# Patient Record
Sex: FEMALE | Race: WHITE | NOT HISPANIC OR LATINO | Employment: OTHER | URBAN - METROPOLITAN AREA
[De-identification: names, ages, dates, MRNs, and addresses within clinical notes are randomized per-mention and may not be internally consistent; named-entity substitution may affect disease eponyms.]

---

## 2023-02-22 LAB
ANION GAP IN SER/PLAS: 12 MMOL/L (ref 10–20)
CALCIUM (MG/DL) IN SER/PLAS: 8.5 MG/DL (ref 8.6–10.3)
CARBON DIOXIDE, TOTAL (MMOL/L) IN SER/PLAS: 34 MMOL/L (ref 21–32)
CHLORIDE (MMOL/L) IN SER/PLAS: 96 MMOL/L (ref 98–107)
CREATININE (MG/DL) IN SER/PLAS: 1.06 MG/DL (ref 0.5–1.05)
ERYTHROCYTE DISTRIBUTION WIDTH (RATIO) BY AUTOMATED COUNT: 16.3 % (ref 11.5–14.5)
ERYTHROCYTE MEAN CORPUSCULAR HEMOGLOBIN CONCENTRATION (G/DL) BY AUTOMATED: 28.9 G/DL (ref 32–36)
ERYTHROCYTE MEAN CORPUSCULAR VOLUME (FL) BY AUTOMATED COUNT: 98 FL (ref 80–100)
ERYTHROCYTES (10*6/UL) IN BLOOD BY AUTOMATED COUNT: 3.53 X10E12/L (ref 4–5.2)
GFR FEMALE: 56 ML/MIN/1.73M2
GLUCOSE (MG/DL) IN SER/PLAS: 103 MG/DL (ref 74–99)
HEMATOCRIT (%) IN BLOOD BY AUTOMATED COUNT: 34.6 % (ref 36–46)
HEMOGLOBIN (G/DL) IN BLOOD: 10 G/DL (ref 12–16)
LEUKOCYTES (10*3/UL) IN BLOOD BY AUTOMATED COUNT: 8.4 X10E9/L (ref 4.4–11.3)
NRBC (PER 100 WBCS) BY AUTOMATED COUNT: 0 /100 WBC (ref 0–0)
PLATELETS (10*3/UL) IN BLOOD AUTOMATED COUNT: 379 X10E9/L (ref 150–450)
POTASSIUM (MMOL/L) IN SER/PLAS: 4.6 MMOL/L (ref 3.5–5.3)
SODIUM (MMOL/L) IN SER/PLAS: 137 MMOL/L (ref 136–145)
UREA NITROGEN (MG/DL) IN SER/PLAS: 22 MG/DL (ref 6–23)

## 2023-03-14 LAB
ALANINE AMINOTRANSFERASE (SGPT) (U/L) IN SER/PLAS: 4 U/L (ref 7–45)
ALBUMIN (G/DL) IN SER/PLAS: 3 G/DL (ref 3.4–5)
ALKALINE PHOSPHATASE (U/L) IN SER/PLAS: 59 U/L (ref 33–136)
ANION GAP IN SER/PLAS: 19 MMOL/L (ref 10–20)
ASPARTATE AMINOTRANSFERASE (SGOT) (U/L) IN SER/PLAS: 8 U/L (ref 9–39)
BILIRUBIN TOTAL (MG/DL) IN SER/PLAS: 0.4 MG/DL (ref 0–1.2)
CALCIUM (MG/DL) IN SER/PLAS: 8.7 MG/DL (ref 8.6–10.3)
CARBON DIOXIDE, TOTAL (MMOL/L) IN SER/PLAS: 21 MMOL/L (ref 21–32)
CHLORIDE (MMOL/L) IN SER/PLAS: 103 MMOL/L (ref 98–107)
CREATININE (MG/DL) IN SER/PLAS: 1.82 MG/DL (ref 0.5–1.05)
ERYTHROCYTE DISTRIBUTION WIDTH (RATIO) BY AUTOMATED COUNT: 18 % (ref 11.5–14.5)
ERYTHROCYTE MEAN CORPUSCULAR HEMOGLOBIN CONCENTRATION (G/DL) BY AUTOMATED: 27.1 G/DL (ref 32–36)
ERYTHROCYTE MEAN CORPUSCULAR VOLUME (FL) BY AUTOMATED COUNT: 105 FL (ref 80–100)
ERYTHROCYTES (10*6/UL) IN BLOOD BY AUTOMATED COUNT: 3.72 X10E12/L (ref 4–5.2)
GFR FEMALE: 29 ML/MIN/1.73M2
GLUCOSE (MG/DL) IN SER/PLAS: 77 MG/DL (ref 74–99)
HEMATOCRIT (%) IN BLOOD BY AUTOMATED COUNT: 39.1 % (ref 36–46)
HEMOGLOBIN (G/DL) IN BLOOD: 10.6 G/DL (ref 12–16)
LEUKOCYTES (10*3/UL) IN BLOOD BY AUTOMATED COUNT: 22.8 X10E9/L (ref 4.4–11.3)
NRBC (PER 100 WBCS) BY AUTOMATED COUNT: 0.2 /100 WBC (ref 0–0)
PLATELETS (10*3/UL) IN BLOOD AUTOMATED COUNT: 298 X10E9/L (ref 150–450)
POTASSIUM (MMOL/L) IN SER/PLAS: 4.8 MMOL/L (ref 3.5–5.3)
PROTEIN TOTAL: 5.7 G/DL (ref 6.4–8.2)
SODIUM (MMOL/L) IN SER/PLAS: 138 MMOL/L (ref 136–145)
UREA NITROGEN (MG/DL) IN SER/PLAS: 64 MG/DL (ref 6–23)

## 2023-03-15 LAB
ANION GAP IN SER/PLAS: 20 MMOL/L (ref 10–20)
BASOPHILS (10*3/UL) IN BLOOD BY AUTOMATED COUNT: 0.05 X10E9/L (ref 0–0.1)
BASOPHILS/100 LEUKOCYTES IN BLOOD BY AUTOMATED COUNT: 0.2 % (ref 0–2)
CALCIUM (MG/DL) IN SER/PLAS: 8.5 MG/DL (ref 8.6–10.3)
CARBON DIOXIDE, TOTAL (MMOL/L) IN SER/PLAS: 21 MMOL/L (ref 21–32)
CHLORIDE (MMOL/L) IN SER/PLAS: 101 MMOL/L (ref 98–107)
CREATININE (MG/DL) IN SER/PLAS: 2.38 MG/DL (ref 0.5–1.05)
EOSINOPHILS (10*3/UL) IN BLOOD BY AUTOMATED COUNT: 0.02 X10E9/L (ref 0–0.4)
EOSINOPHILS/100 LEUKOCYTES IN BLOOD BY AUTOMATED COUNT: 0.1 % (ref 0–6)
ERYTHROCYTE DISTRIBUTION WIDTH (RATIO) BY AUTOMATED COUNT: 17.9 % (ref 11.5–14.5)
ERYTHROCYTE MEAN CORPUSCULAR HEMOGLOBIN CONCENTRATION (G/DL) BY AUTOMATED: 27.2 G/DL (ref 32–36)
ERYTHROCYTE MEAN CORPUSCULAR VOLUME (FL) BY AUTOMATED COUNT: 101 FL (ref 80–100)
ERYTHROCYTES (10*6/UL) IN BLOOD BY AUTOMATED COUNT: 3.66 X10E12/L (ref 4–5.2)
GFR FEMALE: 21 ML/MIN/1.73M2
GLUCOSE (MG/DL) IN SER/PLAS: 70 MG/DL (ref 74–99)
HEMATOCRIT (%) IN BLOOD BY AUTOMATED COUNT: 37.1 % (ref 36–46)
HEMOGLOBIN (G/DL) IN BLOOD: 10.1 G/DL (ref 12–16)
IMMATURE GRANULOCYTES/100 LEUKOCYTES IN BLOOD BY AUTOMATED COUNT: 1.4 % (ref 0–0.9)
LEUKOCYTES (10*3/UL) IN BLOOD BY AUTOMATED COUNT: 24.5 X10E9/L (ref 4.4–11.3)
LYMPHOCYTES (10*3/UL) IN BLOOD BY AUTOMATED COUNT: 1.1 X10E9/L (ref 0.8–3)
LYMPHOCYTES/100 LEUKOCYTES IN BLOOD BY AUTOMATED COUNT: 4.5 % (ref 13–44)
MONOCYTES (10*3/UL) IN BLOOD BY AUTOMATED COUNT: 1.99 X10E9/L (ref 0.05–0.8)
MONOCYTES/100 LEUKOCYTES IN BLOOD BY AUTOMATED COUNT: 8.1 % (ref 2–10)
NEUTROPHILS (10*3/UL) IN BLOOD BY AUTOMATED COUNT: 20.95 X10E9/L (ref 1.6–5.5)
NEUTROPHILS/100 LEUKOCYTES IN BLOOD BY AUTOMATED COUNT: 85.7 % (ref 40–80)
NRBC (PER 100 WBCS) BY AUTOMATED COUNT: 0.4 /100 WBC (ref 0–0)
PLATELETS (10*3/UL) IN BLOOD AUTOMATED COUNT: 309 X10E9/L (ref 150–450)
POTASSIUM (MMOL/L) IN SER/PLAS: 4.6 MMOL/L (ref 3.5–5.3)
RBC MORPHOLOGY IN BLOOD: NORMAL
SODIUM (MMOL/L) IN SER/PLAS: 137 MMOL/L (ref 136–145)
UREA NITROGEN (MG/DL) IN SER/PLAS: 69 MG/DL (ref 6–23)

## 2023-11-09 ENCOUNTER — LAB REQUISITION (OUTPATIENT)
Dept: LAB | Facility: HOSPITAL | Age: 73
End: 2023-11-09
Payer: MEDICARE

## 2023-11-09 ENCOUNTER — APPOINTMENT (OUTPATIENT)
Dept: RADIOLOGY | Facility: HOSPITAL | Age: 73
DRG: 291 | End: 2023-11-09
Payer: MEDICARE

## 2023-11-09 ENCOUNTER — HOSPITAL ENCOUNTER (INPATIENT)
Facility: HOSPITAL | Age: 73
LOS: 13 days | Discharge: HOSPICE | DRG: 291 | End: 2023-11-22
Attending: EMERGENCY MEDICINE | Admitting: INTERNAL MEDICINE
Payer: MEDICARE

## 2023-11-09 DIAGNOSIS — N17.9 AKI (ACUTE KIDNEY INJURY) (CMS-HCC): ICD-10-CM

## 2023-11-09 DIAGNOSIS — I50.9 ACUTE DECOMPENSATED HEART FAILURE (MULTI): ICD-10-CM

## 2023-11-09 DIAGNOSIS — N39.0 URINARY TRACT INFECTION ASSOCIATED WITH INDWELLING URETHRAL CATHETER, INITIAL ENCOUNTER (CMS-HCC): ICD-10-CM

## 2023-11-09 DIAGNOSIS — T83.511A URINARY TRACT INFECTION ASSOCIATED WITH INDWELLING URETHRAL CATHETER, INITIAL ENCOUNTER (CMS-HCC): ICD-10-CM

## 2023-11-09 DIAGNOSIS — I48.20 CHRONIC ATRIAL FIBRILLATION (MULTI): ICD-10-CM

## 2023-11-09 DIAGNOSIS — R41.82 ALTERED MENTAL STATUS, UNSPECIFIED: ICD-10-CM

## 2023-11-09 DIAGNOSIS — R09.02 HYPOXIA: ICD-10-CM

## 2023-11-09 DIAGNOSIS — I50.9 HEART FAILURE, UNSPECIFIED (MULTI): ICD-10-CM

## 2023-11-09 DIAGNOSIS — M62.82 NON-TRAUMATIC RHABDOMYOLYSIS: Primary | ICD-10-CM

## 2023-11-09 DIAGNOSIS — E11.9 TYPE 2 DIABETES MELLITUS WITHOUT COMPLICATIONS (MULTI): ICD-10-CM

## 2023-11-09 LAB
ABO GROUP (TYPE) IN BLOOD: NORMAL
ALBUMIN SERPL BCP-MCNC: 3 G/DL (ref 3.4–5)
ALP SERPL-CCNC: 89 U/L (ref 33–136)
ALT SERPL W P-5'-P-CCNC: 25 U/L (ref 7–45)
ANION GAP SERPL CALC-SCNC: 12 MMOL/L (ref 10–20)
ANION GAP SERPL CALC-SCNC: 12 MMOL/L (ref 10–20)
ANTIBODY SCREEN: NORMAL
APPEARANCE UR: ABNORMAL
APTT PPP: 30 SECONDS (ref 27–38)
AST SERPL W P-5'-P-CCNC: 8 U/L (ref 9–39)
BACTERIA #/AREA URNS AUTO: ABNORMAL /HPF
BASE EXCESS BLDV CALC-SCNC: 3.2 MMOL/L (ref -2–3)
BASOPHILS # BLD AUTO: 0.02 X10*3/UL (ref 0–0.1)
BASOPHILS # BLD AUTO: 0.03 X10*3/UL (ref 0–0.1)
BASOPHILS NFR BLD AUTO: 0.1 %
BASOPHILS NFR BLD AUTO: 0.3 %
BILIRUB SERPL-MCNC: 0.3 MG/DL (ref 0–1.2)
BILIRUB UR STRIP.AUTO-MCNC: NEGATIVE MG/DL
BNP SERPL-MCNC: 116 PG/ML (ref 0–99)
BODY TEMPERATURE: 37 DEGREES CELSIUS
BUN SERPL-MCNC: 59 MG/DL (ref 6–23)
BUN SERPL-MCNC: 59 MG/DL (ref 6–23)
CALCIUM SERPL-MCNC: 8.5 MG/DL (ref 8.6–10.3)
CALCIUM SERPL-MCNC: 8.7 MG/DL (ref 8.6–10.3)
CARDIAC TROPONIN I PNL SERPL HS: 17 NG/L (ref 0–13)
CHLORIDE SERPL-SCNC: 101 MMOL/L (ref 98–107)
CHLORIDE SERPL-SCNC: 102 MMOL/L (ref 98–107)
CO2 SERPL-SCNC: 32 MMOL/L (ref 21–32)
CO2 SERPL-SCNC: 33 MMOL/L (ref 21–32)
COLOR UR: YELLOW
CREAT SERPL-MCNC: 1.27 MG/DL (ref 0.5–1.05)
CREAT SERPL-MCNC: 1.46 MG/DL (ref 0.5–1.05)
EOSINOPHIL # BLD AUTO: 0.01 X10*3/UL (ref 0–0.4)
EOSINOPHIL # BLD AUTO: 0.2 X10*3/UL (ref 0–0.4)
EOSINOPHIL NFR BLD AUTO: 0.1 %
EOSINOPHIL NFR BLD AUTO: 1.7 %
ERYTHROCYTE [DISTWIDTH] IN BLOOD BY AUTOMATED COUNT: 17.8 % (ref 11.5–14.5)
ERYTHROCYTE [DISTWIDTH] IN BLOOD BY AUTOMATED COUNT: 17.8 % (ref 11.5–14.5)
GFR SERPL CREATININE-BSD FRML MDRD: 38 ML/MIN/1.73M*2
GFR SERPL CREATININE-BSD FRML MDRD: 45 ML/MIN/1.73M*2
GLUCOSE SERPL-MCNC: 118 MG/DL (ref 74–99)
GLUCOSE SERPL-MCNC: 183 MG/DL (ref 74–99)
GLUCOSE UR STRIP.AUTO-MCNC: NEGATIVE MG/DL
HCO3 BLDV-SCNC: 31.5 MMOL/L (ref 22–26)
HCT VFR BLD AUTO: 29.7 % (ref 36–46)
HCT VFR BLD AUTO: 30.4 % (ref 36–46)
HGB BLD-MCNC: 8.3 G/DL (ref 12–16)
HGB BLD-MCNC: 8.3 G/DL (ref 12–16)
HYALINE CASTS #/AREA URNS AUTO: ABNORMAL /LPF
IMM GRANULOCYTES # BLD AUTO: 0.14 X10*3/UL (ref 0–0.5)
IMM GRANULOCYTES # BLD AUTO: 0.15 X10*3/UL (ref 0–0.5)
IMM GRANULOCYTES NFR BLD AUTO: 1 % (ref 0–0.9)
IMM GRANULOCYTES NFR BLD AUTO: 1.2 % (ref 0–0.9)
INHALED O2 CONCENTRATION: 36 %
INR PPP: 1 (ref 0.9–1.1)
KETONES UR STRIP.AUTO-MCNC: NEGATIVE MG/DL
LACTATE SERPL-SCNC: 3.6 MMOL/L (ref 0.4–2)
LEUKOCYTE ESTERASE UR QL STRIP.AUTO: ABNORMAL
LYMPHOCYTES # BLD AUTO: 0.38 X10*3/UL (ref 0.8–3)
LYMPHOCYTES # BLD AUTO: 1.09 X10*3/UL (ref 0.8–3)
LYMPHOCYTES NFR BLD AUTO: 2.6 %
LYMPHOCYTES NFR BLD AUTO: 9.3 %
MAGNESIUM SERPL-MCNC: 1.67 MG/DL (ref 1.6–2.4)
MCH RBC QN AUTO: 25.8 PG (ref 26–34)
MCH RBC QN AUTO: 26.3 PG (ref 26–34)
MCHC RBC AUTO-ENTMCNC: 27.3 G/DL (ref 32–36)
MCHC RBC AUTO-ENTMCNC: 27.9 G/DL (ref 32–36)
MCV RBC AUTO: 94 FL (ref 80–100)
MCV RBC AUTO: 94 FL (ref 80–100)
MONOCYTES # BLD AUTO: 0.5 X10*3/UL (ref 0.05–0.8)
MONOCYTES # BLD AUTO: 1.07 X10*3/UL (ref 0.05–0.8)
MONOCYTES NFR BLD AUTO: 3.4 %
MONOCYTES NFR BLD AUTO: 9.2 %
MUCOUS THREADS #/AREA URNS AUTO: ABNORMAL /LPF
NEUTROPHILS # BLD AUTO: 13.55 X10*3/UL (ref 1.6–5.5)
NEUTROPHILS # BLD AUTO: 9.13 X10*3/UL (ref 1.6–5.5)
NEUTROPHILS NFR BLD AUTO: 78.3 %
NEUTROPHILS NFR BLD AUTO: 92.8 %
NITRITE UR QL STRIP.AUTO: NEGATIVE
NRBC BLD-RTO: 0.3 /100 WBCS (ref 0–0)
NRBC BLD-RTO: 0.3 /100 WBCS (ref 0–0)
OXYHGB MFR BLDV: 59 % (ref 45–75)
PCO2 BLDV: 64 MM HG (ref 41–51)
PH BLDV: 7.3 PH (ref 7.33–7.43)
PH UR STRIP.AUTO: 5 [PH]
PLATELET # BLD AUTO: 306 X10*3/UL (ref 150–450)
PLATELET # BLD AUTO: 319 X10*3/UL (ref 150–450)
PO2 BLDV: 36 MM HG (ref 35–45)
POTASSIUM SERPL-SCNC: 4 MMOL/L (ref 3.5–5.3)
POTASSIUM SERPL-SCNC: 4.1 MMOL/L (ref 3.5–5.3)
PROT SERPL-MCNC: 5.6 G/DL (ref 6.4–8.2)
PROT UR STRIP.AUTO-MCNC: NEGATIVE MG/DL
PROTHROMBIN TIME: 11.6 SECONDS (ref 9.8–12.8)
RBC # BLD AUTO: 3.15 X10*6/UL (ref 4–5.2)
RBC # BLD AUTO: 3.22 X10*6/UL (ref 4–5.2)
RBC # UR STRIP.AUTO: ABNORMAL /UL
RBC #/AREA URNS AUTO: >20 /HPF
RH FACTOR (ANTIGEN D): NORMAL
SAO2 % BLDV: 61 % (ref 45–75)
SODIUM SERPL-SCNC: 142 MMOL/L (ref 136–145)
SODIUM SERPL-SCNC: 142 MMOL/L (ref 136–145)
SP GR UR STRIP.AUTO: 1.01
SQUAMOUS #/AREA URNS AUTO: ABNORMAL /HPF
TEST COMMENT: ABNORMAL
TRANS CELLS #/AREA UR COMP ASSIST: ABNORMAL /HPF
UROBILINOGEN UR STRIP.AUTO-MCNC: <2 MG/DL
WBC # BLD AUTO: 11.7 X10*3/UL (ref 4.4–11.3)
WBC # BLD AUTO: 14.6 X10*3/UL (ref 4.4–11.3)
WBC #/AREA URNS AUTO: >50 /HPF
WBC CLUMPS #/AREA URNS AUTO: ABNORMAL /HPF
YEAST BUDDING #/AREA UR COMP ASSIST: PRESENT /HPF

## 2023-11-09 PROCEDURE — 36415 COLL VENOUS BLD VENIPUNCTURE: CPT | Performed by: EMERGENCY MEDICINE

## 2023-11-09 PROCEDURE — 85730 THROMBOPLASTIN TIME PARTIAL: CPT | Performed by: EMERGENCY MEDICINE

## 2023-11-09 PROCEDURE — 71045 X-RAY EXAM CHEST 1 VIEW: CPT | Mod: FOREIGN READ | Performed by: RADIOLOGY

## 2023-11-09 PROCEDURE — 82805 BLOOD GASES W/O2 SATURATION: CPT | Performed by: EMERGENCY MEDICINE

## 2023-11-09 PROCEDURE — 84484 ASSAY OF TROPONIN QUANT: CPT | Performed by: EMERGENCY MEDICINE

## 2023-11-09 PROCEDURE — 85025 COMPLETE CBC W/AUTO DIFF WBC: CPT

## 2023-11-09 PROCEDURE — 71045 X-RAY EXAM CHEST 1 VIEW: CPT | Mod: FY,FR

## 2023-11-09 PROCEDURE — 87086 URINE CULTURE/COLONY COUNT: CPT | Mod: PARLAB | Performed by: EMERGENCY MEDICINE

## 2023-11-09 PROCEDURE — 99285 EMERGENCY DEPT VISIT HI MDM: CPT | Mod: 25 | Performed by: EMERGENCY MEDICINE

## 2023-11-09 PROCEDURE — 80053 COMPREHEN METABOLIC PANEL: CPT | Performed by: EMERGENCY MEDICINE

## 2023-11-09 PROCEDURE — 85610 PROTHROMBIN TIME: CPT | Performed by: EMERGENCY MEDICINE

## 2023-11-09 PROCEDURE — 1210000001 HC SEMI-PRIVATE ROOM DAILY

## 2023-11-09 PROCEDURE — 86901 BLOOD TYPING SEROLOGIC RH(D): CPT | Performed by: EMERGENCY MEDICINE

## 2023-11-09 PROCEDURE — 80048 BASIC METABOLIC PNL TOTAL CA: CPT

## 2023-11-09 PROCEDURE — 2500000004 HC RX 250 GENERAL PHARMACY W/ HCPCS (ALT 636 FOR OP/ED): Performed by: EMERGENCY MEDICINE

## 2023-11-09 PROCEDURE — 83605 ASSAY OF LACTIC ACID: CPT | Performed by: EMERGENCY MEDICINE

## 2023-11-09 PROCEDURE — 83735 ASSAY OF MAGNESIUM: CPT | Performed by: EMERGENCY MEDICINE

## 2023-11-09 PROCEDURE — 81001 URINALYSIS AUTO W/SCOPE: CPT | Performed by: EMERGENCY MEDICINE

## 2023-11-09 PROCEDURE — 85025 COMPLETE CBC W/AUTO DIFF WBC: CPT | Performed by: EMERGENCY MEDICINE

## 2023-11-09 PROCEDURE — 83880 ASSAY OF NATRIURETIC PEPTIDE: CPT | Performed by: EMERGENCY MEDICINE

## 2023-11-09 RX ORDER — FUROSEMIDE 10 MG/ML
40 INJECTION INTRAMUSCULAR; INTRAVENOUS ONCE
Status: COMPLETED | OUTPATIENT
Start: 2023-11-09 | End: 2023-11-09

## 2023-11-09 RX ADMIN — FUROSEMIDE 40 MG: 10 INJECTION, SOLUTION INTRAMUSCULAR; INTRAVENOUS at 23:44

## 2023-11-09 NOTE — Clinical Note
Dialysis catheter placed tunneled thru right jugular patient tolerated well flushed with heparin, sutured in place.

## 2023-11-10 PROBLEM — I50.9 ACUTE DECOMPENSATED HEART FAILURE (MULTI): Status: ACTIVE | Noted: 2023-11-10

## 2023-11-10 LAB
CARDIAC TROPONIN I PNL SERPL HS: 18 NG/L (ref 0–13)
GLUCOSE BLD MANUAL STRIP-MCNC: 101 MG/DL (ref 74–99)
GLUCOSE BLD MANUAL STRIP-MCNC: 119 MG/DL (ref 74–99)
GLUCOSE BLD MANUAL STRIP-MCNC: 152 MG/DL (ref 74–99)
GLUCOSE BLD MANUAL STRIP-MCNC: 51 MG/DL (ref 74–99)
GLUCOSE BLD MANUAL STRIP-MCNC: 80 MG/DL (ref 74–99)
LACTATE SERPL-SCNC: 0.5 MMOL/L (ref 0.4–2)

## 2023-11-10 PROCEDURE — 2500000004 HC RX 250 GENERAL PHARMACY W/ HCPCS (ALT 636 FOR OP/ED): Performed by: INTERNAL MEDICINE

## 2023-11-10 PROCEDURE — 2060000001 HC INTERMEDIATE ICU ROOM DAILY

## 2023-11-10 PROCEDURE — 2500000004 HC RX 250 GENERAL PHARMACY W/ HCPCS (ALT 636 FOR OP/ED): Performed by: NURSE PRACTITIONER

## 2023-11-10 PROCEDURE — 83605 ASSAY OF LACTIC ACID: CPT | Performed by: EMERGENCY MEDICINE

## 2023-11-10 PROCEDURE — 99223 1ST HOSP IP/OBS HIGH 75: CPT | Performed by: NURSE PRACTITIONER

## 2023-11-10 PROCEDURE — 2500000001 HC RX 250 WO HCPCS SELF ADMINISTERED DRUGS (ALT 637 FOR MEDICARE OP): Performed by: INTERNAL MEDICINE

## 2023-11-10 PROCEDURE — 2500000001 HC RX 250 WO HCPCS SELF ADMINISTERED DRUGS (ALT 637 FOR MEDICARE OP): Performed by: NURSE PRACTITIONER

## 2023-11-10 PROCEDURE — 82947 ASSAY GLUCOSE BLOOD QUANT: CPT

## 2023-11-10 PROCEDURE — 84484 ASSAY OF TROPONIN QUANT: CPT | Performed by: INTERNAL MEDICINE

## 2023-11-10 RX ORDER — PANTOPRAZOLE SODIUM 40 MG/1
40 TABLET, DELAYED RELEASE ORAL DAILY
COMMUNITY

## 2023-11-10 RX ORDER — EAR PLUGS
1 EACH OTIC (EAR)
Status: DISCONTINUED | OUTPATIENT
Start: 2023-11-10 | End: 2023-11-10

## 2023-11-10 RX ORDER — GABAPENTIN 100 MG/1
100 CAPSULE ORAL DAILY
Status: DISCONTINUED | OUTPATIENT
Start: 2023-11-10 | End: 2023-11-22

## 2023-11-10 RX ORDER — LORAZEPAM 0.5 MG/1
1 TABLET ORAL DAILY PRN
Status: DISCONTINUED | OUTPATIENT
Start: 2023-11-10 | End: 2023-11-22 | Stop reason: HOSPADM

## 2023-11-10 RX ORDER — NYSTATIN 100000 [USP'U]/G
1 POWDER TOPICAL AS NEEDED
Status: DISCONTINUED | OUTPATIENT
Start: 2023-11-10 | End: 2023-11-22 | Stop reason: HOSPADM

## 2023-11-10 RX ORDER — ACETAMINOPHEN 325 MG/1
325 TABLET ORAL EVERY 8 HOURS PRN
Status: DISCONTINUED | OUTPATIENT
Start: 2023-11-10 | End: 2023-11-22 | Stop reason: HOSPADM

## 2023-11-10 RX ORDER — DOCUSATE SODIUM 250 MG
100 CAPSULE ORAL 2 TIMES DAILY
COMMUNITY

## 2023-11-10 RX ORDER — METOPROLOL TARTRATE 25 MG/1
25 TABLET, FILM COATED ORAL 2 TIMES DAILY
Status: DISCONTINUED | OUTPATIENT
Start: 2023-11-10 | End: 2023-11-22 | Stop reason: HOSPADM

## 2023-11-10 RX ORDER — ACETAMINOPHEN 500 MG
500 TABLET ORAL EVERY 8 HOURS PRN
COMMUNITY

## 2023-11-10 RX ORDER — GABAPENTIN 100 MG/1
100 CAPSULE ORAL DAILY
COMMUNITY

## 2023-11-10 RX ORDER — LORAZEPAM 1 MG/1
1 TABLET ORAL DAILY PRN
COMMUNITY

## 2023-11-10 RX ORDER — PREDNISONE 20 MG/1
20 TABLET ORAL DAILY
COMMUNITY

## 2023-11-10 RX ORDER — LEVOTHYROXINE SODIUM 100 UG/1
100 TABLET ORAL
Status: DISCONTINUED | OUTPATIENT
Start: 2023-11-11 | End: 2023-11-10

## 2023-11-10 RX ORDER — BUSPIRONE HYDROCHLORIDE 10 MG/1
5 TABLET ORAL 2 TIMES DAILY
Status: DISCONTINUED | OUTPATIENT
Start: 2023-11-10 | End: 2023-11-22 | Stop reason: HOSPADM

## 2023-11-10 RX ORDER — INSULIN LISPRO 100 [IU]/ML
0-5 INJECTION, SOLUTION INTRAVENOUS; SUBCUTANEOUS
Status: DISCONTINUED | OUTPATIENT
Start: 2023-11-10 | End: 2023-11-18

## 2023-11-10 RX ORDER — IBUPROFEN 200 MG
TABLET ORAL DAILY
COMMUNITY

## 2023-11-10 RX ORDER — DILTIAZEM HYDROCHLORIDE 240 MG/1
240 CAPSULE, COATED, EXTENDED RELEASE ORAL DAILY
COMMUNITY

## 2023-11-10 RX ORDER — BISACODYL 5 MG
10 TABLET, DELAYED RELEASE (ENTERIC COATED) ORAL DAILY PRN
Status: DISCONTINUED | OUTPATIENT
Start: 2023-11-10 | End: 2023-11-22 | Stop reason: HOSPADM

## 2023-11-10 RX ORDER — NYSTATIN 100000 [USP'U]/G
1 POWDER TOPICAL AS NEEDED
COMMUNITY

## 2023-11-10 RX ORDER — DILTIAZEM HYDROCHLORIDE 240 MG/1
240 CAPSULE, COATED, EXTENDED RELEASE ORAL DAILY
Status: DISCONTINUED | OUTPATIENT
Start: 2023-11-10 | End: 2023-11-22 | Stop reason: HOSPADM

## 2023-11-10 RX ORDER — ZINC OXIDE 20 G/100G
1 OINTMENT TOPICAL
Status: DISCONTINUED | OUTPATIENT
Start: 2023-11-10 | End: 2023-11-22 | Stop reason: HOSPADM

## 2023-11-10 RX ORDER — METOPROLOL TARTRATE 25 MG/1
25 TABLET, FILM COATED ORAL 2 TIMES DAILY
COMMUNITY

## 2023-11-10 RX ORDER — LEVOTHYROXINE SODIUM 50 UG/1
100 TABLET ORAL
COMMUNITY

## 2023-11-10 RX ORDER — PANTOPRAZOLE SODIUM 40 MG/1
40 TABLET, DELAYED RELEASE ORAL DAILY
Status: DISCONTINUED | OUTPATIENT
Start: 2023-11-10 | End: 2023-11-15

## 2023-11-10 RX ORDER — TORSEMIDE 20 MG/1
20 TABLET ORAL DAILY
COMMUNITY

## 2023-11-10 RX ORDER — DEXTROSE MONOHYDRATE 100 MG/ML
0.3 INJECTION, SOLUTION INTRAVENOUS ONCE AS NEEDED
Status: DISCONTINUED | OUTPATIENT
Start: 2023-11-10 | End: 2023-11-22 | Stop reason: HOSPADM

## 2023-11-10 RX ORDER — LEVOTHYROXINE SODIUM 50 UG/1
50 TABLET ORAL
COMMUNITY

## 2023-11-10 RX ORDER — BISACODYL 5 MG
10 TABLET, DELAYED RELEASE (ENTERIC COATED) ORAL DAILY PRN
COMMUNITY

## 2023-11-10 RX ORDER — FUROSEMIDE 10 MG/ML
20 INJECTION INTRAMUSCULAR; INTRAVENOUS EVERY 12 HOURS
Status: DISCONTINUED | OUTPATIENT
Start: 2023-11-10 | End: 2023-11-12

## 2023-11-10 RX ORDER — POTASSIUM CHLORIDE 20 MEQ/1
20 TABLET, EXTENDED RELEASE ORAL DAILY
Status: DISCONTINUED | OUTPATIENT
Start: 2023-11-10 | End: 2023-11-22 | Stop reason: HOSPADM

## 2023-11-10 RX ORDER — DEXTROSE 50 % IN WATER (D50W) INTRAVENOUS SYRINGE
25
Status: DISCONTINUED | OUTPATIENT
Start: 2023-11-10 | End: 2023-11-22 | Stop reason: HOSPADM

## 2023-11-10 RX ORDER — POTASSIUM CHLORIDE 20 MEQ/1
20 TABLET, EXTENDED RELEASE ORAL DAILY
COMMUNITY

## 2023-11-10 RX ORDER — LEVOTHYROXINE SODIUM 50 UG/1
50 TABLET ORAL
Status: DISCONTINUED | OUTPATIENT
Start: 2023-11-11 | End: 2023-11-10

## 2023-11-10 RX ORDER — TORSEMIDE 20 MG/1
20 TABLET ORAL DAILY
Status: DISCONTINUED | OUTPATIENT
Start: 2023-11-10 | End: 2023-11-22 | Stop reason: HOSPADM

## 2023-11-10 RX ORDER — TRAMADOL HYDROCHLORIDE 50 MG/1
50 TABLET ORAL EVERY 6 HOURS PRN
COMMUNITY

## 2023-11-10 RX ORDER — LEVOTHYROXINE SODIUM 100 UG/1
100 TABLET ORAL
Status: DISCONTINUED | OUTPATIENT
Start: 2023-11-13 | End: 2023-11-22 | Stop reason: HOSPADM

## 2023-11-10 RX ORDER — FLUCONAZOLE 100 MG/1
150 TABLET ORAL ONCE
Status: COMPLETED | OUTPATIENT
Start: 2023-11-10 | End: 2023-11-10

## 2023-11-10 RX ORDER — POLYETHYLENE GLYCOL 3350 17 G/17G
17 POWDER, FOR SOLUTION ORAL DAILY
Status: DISCONTINUED | OUTPATIENT
Start: 2023-11-10 | End: 2023-11-22 | Stop reason: HOSPADM

## 2023-11-10 RX ORDER — PREDNISONE 20 MG/1
20 TABLET ORAL DAILY
Status: DISCONTINUED | OUTPATIENT
Start: 2023-11-10 | End: 2023-11-22 | Stop reason: HOSPADM

## 2023-11-10 RX ORDER — TRAMADOL HYDROCHLORIDE 50 MG/1
50 TABLET ORAL EVERY 6 HOURS PRN
Status: DISCONTINUED | OUTPATIENT
Start: 2023-11-10 | End: 2023-11-22 | Stop reason: HOSPADM

## 2023-11-10 RX ORDER — L. ACIDOPHILUS/L.BULGARICUS 1MM CELL
1 TABLET ORAL DAILY
Status: DISCONTINUED | OUTPATIENT
Start: 2023-11-10 | End: 2023-11-22 | Stop reason: HOSPADM

## 2023-11-10 RX ORDER — LEVOTHYROXINE SODIUM 50 UG/1
50 TABLET ORAL
Status: DISCONTINUED | OUTPATIENT
Start: 2023-11-11 | End: 2023-11-22 | Stop reason: HOSPADM

## 2023-11-10 RX ORDER — BUSPIRONE HYDROCHLORIDE 5 MG/1
5 TABLET ORAL 2 TIMES DAILY
COMMUNITY

## 2023-11-10 RX ADMIN — PREDNISONE 20 MG: 20 TABLET ORAL at 12:00

## 2023-11-10 RX ADMIN — Medication 1 TABLET: at 12:00

## 2023-11-10 RX ADMIN — GABAPENTIN 100 MG: 100 CAPSULE ORAL at 12:00

## 2023-11-10 RX ADMIN — PIPERACILLIN SODIUM AND TAZOBACTAM SODIUM 3.38 G: 3; .375 INJECTION, SOLUTION INTRAVENOUS at 17:30

## 2023-11-10 RX ADMIN — PIPERACILLIN SODIUM AND TAZOBACTAM SODIUM 3.38 G: 3; .375 INJECTION, SOLUTION INTRAVENOUS at 11:59

## 2023-11-10 RX ADMIN — APIXABAN 5 MG: 5 TABLET, FILM COATED ORAL at 12:00

## 2023-11-10 RX ADMIN — FLUCONAZOLE 150 MG: 100 TABLET ORAL at 06:09

## 2023-11-10 RX ADMIN — BUSPIRONE HYDROCHLORIDE 5 MG: 5 TABLET ORAL at 21:55

## 2023-11-10 RX ADMIN — BUSPIRONE HYDROCHLORIDE 5 MG: 5 TABLET ORAL at 12:00

## 2023-11-10 RX ADMIN — PIPERACILLIN SODIUM AND TAZOBACTAM SODIUM 3.38 G: 3; .375 INJECTION, SOLUTION INTRAVENOUS at 06:34

## 2023-11-10 RX ADMIN — APIXABAN 5 MG: 5 TABLET, FILM COATED ORAL at 21:55

## 2023-11-10 RX ADMIN — POLYETHYLENE GLYCOL 3350 17 G: 17 POWDER, FOR SOLUTION ORAL at 09:24

## 2023-11-10 RX ADMIN — TORSEMIDE 20 MG: 20 TABLET ORAL at 11:59

## 2023-11-10 RX ADMIN — DILTIAZEM HYDROCHLORIDE 240 MG: 240 CAPSULE, COATED, EXTENDED RELEASE ORAL at 12:01

## 2023-11-10 RX ADMIN — PANTOPRAZOLE SODIUM 40 MG: 40 TABLET, DELAYED RELEASE ORAL at 12:00

## 2023-11-10 RX ADMIN — FUROSEMIDE 20 MG: 10 INJECTION, SOLUTION INTRAMUSCULAR; INTRAVENOUS at 14:10

## 2023-11-10 RX ADMIN — POTASSIUM CHLORIDE 20 MEQ: 1500 TABLET, EXTENDED RELEASE ORAL at 11:59

## 2023-11-10 RX ADMIN — METOPROLOL TARTRATE 25 MG: 25 TABLET, FILM COATED ORAL at 12:00

## 2023-11-10 SDOH — ECONOMIC STABILITY: FOOD INSECURITY: WITHIN THE PAST 12 MONTHS, THE FOOD YOU BOUGHT JUST DIDN'T LAST AND YOU DIDN'T HAVE MONEY TO GET MORE.: NEVER TRUE

## 2023-11-10 SDOH — ECONOMIC STABILITY: TRANSPORTATION INSECURITY
IN THE PAST 12 MONTHS, HAS THE LACK OF TRANSPORTATION KEPT YOU FROM MEDICAL APPOINTMENTS OR FROM GETTING MEDICATIONS?: NO

## 2023-11-10 SDOH — SOCIAL STABILITY: SOCIAL INSECURITY
WITHIN THE LAST YEAR, HAVE YOU BEEN KICKED, HIT, SLAPPED, OR OTHERWISE PHYSICALLY HURT BY YOUR PARTNER OR EX-PARTNER?: NO

## 2023-11-10 SDOH — SOCIAL STABILITY: SOCIAL NETWORK
DO YOU BELONG TO ANY CLUBS OR ORGANIZATIONS SUCH AS CHURCH GROUPS UNIONS, FRATERNAL OR ATHLETIC GROUPS, OR SCHOOL GROUPS?: NO

## 2023-11-10 SDOH — ECONOMIC STABILITY: INCOME INSECURITY: IN THE LAST 12 MONTHS, WAS THERE A TIME WHEN YOU WERE NOT ABLE TO PAY THE MORTGAGE OR RENT ON TIME?: NO

## 2023-11-10 SDOH — SOCIAL STABILITY: SOCIAL INSECURITY: HAVE YOU HAD THOUGHTS OF HARMING ANYONE ELSE?: NO

## 2023-11-10 SDOH — SOCIAL STABILITY: SOCIAL NETWORK: HOW OFTEN DO YOU ATTEND CHURCH OR RELIGIOUS SERVICES?: NEVER

## 2023-11-10 SDOH — HEALTH STABILITY: MENTAL HEALTH: HOW OFTEN DO YOU HAVE A DRINK CONTAINING ALCOHOL?: NEVER

## 2023-11-10 SDOH — ECONOMIC STABILITY: TRANSPORTATION INSECURITY
IN THE PAST 12 MONTHS, HAS LACK OF TRANSPORTATION KEPT YOU FROM MEETINGS, WORK, OR FROM GETTING THINGS NEEDED FOR DAILY LIVING?: NO

## 2023-11-10 SDOH — ECONOMIC STABILITY: INCOME INSECURITY: IN THE PAST 12 MONTHS, HAS THE ELECTRIC, GAS, OIL, OR WATER COMPANY THREATENED TO SHUT OFF SERVICE IN YOUR HOME?: NO

## 2023-11-10 SDOH — HEALTH STABILITY: MENTAL HEALTH
STRESS IS WHEN SOMEONE FEELS TENSE, NERVOUS, ANXIOUS, OR CAN'T SLEEP AT NIGHT BECAUSE THEIR MIND IS TROUBLED. HOW STRESSED ARE YOU?: NOT AT ALL

## 2023-11-10 SDOH — SOCIAL STABILITY: SOCIAL INSECURITY: WITHIN THE LAST YEAR, HAVE YOU BEEN AFRAID OF YOUR PARTNER OR EX-PARTNER?: NO

## 2023-11-10 SDOH — ECONOMIC STABILITY: HOUSING INSECURITY
IN THE LAST 12 MONTHS, WAS THERE A TIME WHEN YOU DID NOT HAVE A STEADY PLACE TO SLEEP OR SLEPT IN A SHELTER (INCLUDING NOW)?: NO

## 2023-11-10 SDOH — ECONOMIC STABILITY: FOOD INSECURITY: WITHIN THE PAST 12 MONTHS, YOU WORRIED THAT YOUR FOOD WOULD RUN OUT BEFORE YOU GOT MONEY TO BUY MORE.: NEVER TRUE

## 2023-11-10 SDOH — SOCIAL STABILITY: SOCIAL INSECURITY
WITHIN THE LAST YEAR, HAVE TO BEEN RAPED OR FORCED TO HAVE ANY KIND OF SEXUAL ACTIVITY BY YOUR PARTNER OR EX-PARTNER?: NO

## 2023-11-10 SDOH — HEALTH STABILITY: MENTAL HEALTH: HOW OFTEN DO YOU HAVE 6 OR MORE DRINKS ON ONE OCCASION?: NEVER

## 2023-11-10 SDOH — SOCIAL STABILITY: SOCIAL INSECURITY: WITHIN THE LAST YEAR, HAVE YOU BEEN HUMILIATED OR EMOTIONALLY ABUSED IN OTHER WAYS BY YOUR PARTNER OR EX-PARTNER?: NO

## 2023-11-10 SDOH — SOCIAL STABILITY: SOCIAL NETWORK: HOW OFTEN DO YOU GET TOGETHER WITH FRIENDS OR RELATIVES?: NEVER

## 2023-11-10 SDOH — ECONOMIC STABILITY: INCOME INSECURITY: HOW HARD IS IT FOR YOU TO PAY FOR THE VERY BASICS LIKE FOOD, HOUSING, MEDICAL CARE, AND HEATING?: NOT VERY HARD

## 2023-11-10 SDOH — SOCIAL STABILITY: SOCIAL NETWORK: IN A TYPICAL WEEK, HOW MANY TIMES DO YOU TALK ON THE PHONE WITH FAMILY, FRIENDS, OR NEIGHBORS?: NEVER

## 2023-11-10 SDOH — SOCIAL STABILITY: SOCIAL NETWORK: HOW OFTEN DO YOU ATTENT MEETINGS OF THE CLUB OR ORGANIZATION YOU BELONG TO?: NEVER

## 2023-11-10 SDOH — HEALTH STABILITY: MENTAL HEALTH: HOW MANY STANDARD DRINKS CONTAINING ALCOHOL DO YOU HAVE ON A TYPICAL DAY?: PATIENT DOES NOT DRINK

## 2023-11-10 ASSESSMENT — COGNITIVE AND FUNCTIONAL STATUS - GENERAL
HELP NEEDED FOR BATHING: TOTAL
DAILY ACTIVITIY SCORE: 8
PERSONAL GROOMING: TOTAL
WALKING IN HOSPITAL ROOM: TOTAL
EATING MEALS: A LITTLE
STANDING UP FROM CHAIR USING ARMS: TOTAL
DRESSING REGULAR LOWER BODY CLOTHING: TOTAL
CLIMB 3 TO 5 STEPS WITH RAILING: TOTAL
DAILY ACTIVITIY SCORE: 8
PERSONAL GROOMING: TOTAL
EATING MEALS: A LITTLE
MOVING FROM LYING ON BACK TO SITTING ON SIDE OF FLAT BED WITH BEDRAILS: TOTAL
TOILETING: TOTAL
MOVING TO AND FROM BED TO CHAIR: TOTAL
PATIENT BASELINE BEDBOUND: YES
DRESSING REGULAR UPPER BODY CLOTHING: TOTAL
TOILETING: TOTAL
MOBILITY SCORE: 6
HELP NEEDED FOR BATHING: TOTAL
TURNING FROM BACK TO SIDE WHILE IN FLAT BAD: TOTAL
DRESSING REGULAR LOWER BODY CLOTHING: TOTAL
DRESSING REGULAR UPPER BODY CLOTHING: TOTAL

## 2023-11-10 ASSESSMENT — LIFESTYLE VARIABLES
SUBSTANCE_ABUSE_PAST_12_MONTHS: NO
AUDIT-C TOTAL SCORE: 0
HOW OFTEN DO YOU HAVE A DRINK CONTAINING ALCOHOL: NEVER
PRESCIPTION_ABUSE_PAST_12_MONTHS: NO
PRESCIPTION_ABUSE_PAST_12_MONTHS: NO
AUDIT-C TOTAL SCORE: 0
AUDIT-C TOTAL SCORE: 0
SUBSTANCE_ABUSE_PAST_12_MONTHS: NO
SKIP TO QUESTIONS 9-10: 1
HOW MANY STANDARD DRINKS CONTAINING ALCOHOL DO YOU HAVE ON A TYPICAL DAY: PATIENT DOES NOT DRINK
SKIP TO QUESTIONS 9-10: 1
HOW OFTEN DO YOU HAVE 6 OR MORE DRINKS ON ONE OCCASION: NEVER

## 2023-11-10 ASSESSMENT — COLUMBIA-SUICIDE SEVERITY RATING SCALE - C-SSRS
1. IN THE PAST MONTH, HAVE YOU WISHED YOU WERE DEAD OR WISHED YOU COULD GO TO SLEEP AND NOT WAKE UP?: NO
2. HAVE YOU ACTUALLY HAD ANY THOUGHTS OF KILLING YOURSELF?: NO
6. HAVE YOU EVER DONE ANYTHING, STARTED TO DO ANYTHING, OR PREPARED TO DO ANYTHING TO END YOUR LIFE?: NO

## 2023-11-10 ASSESSMENT — ACTIVITIES OF DAILY LIVING (ADL)
JUDGMENT_ADEQUATE_SAFELY_COMPLETE_DAILY_ACTIVITIES: YES
FEEDING YOURSELF: INDEPENDENT
GROOMING: DEPENDENT
ADEQUATE_TO_COMPLETE_ADL: YES
BATHING: DEPENDENT
TOILETING: DEPENDENT
WALKS IN HOME: DEPENDENT
HEARING - RIGHT EAR: DIFFICULTY WITH NOISE
DRESSING YOURSELF: DEPENDENT
HEARING - LEFT EAR: DIFFICULTY WITH NOISE
PATIENT'S MEMORY ADEQUATE TO SAFELY COMPLETE DAILY ACTIVITIES?: YES

## 2023-11-10 ASSESSMENT — PAIN SCALES - GENERAL
PAINLEVEL_OUTOF10: 0 - NO PAIN
PAINLEVEL_OUTOF10: 3

## 2023-11-10 ASSESSMENT — PATIENT HEALTH QUESTIONNAIRE - PHQ9
1. LITTLE INTEREST OR PLEASURE IN DOING THINGS: NOT AT ALL
2. FEELING DOWN, DEPRESSED OR HOPELESS: NOT AT ALL
SUM OF ALL RESPONSES TO PHQ9 QUESTIONS 1 & 2: 0

## 2023-11-10 ASSESSMENT — PAIN - FUNCTIONAL ASSESSMENT: PAIN_FUNCTIONAL_ASSESSMENT: 0-10

## 2023-11-10 NOTE — H&P
History Of Present Illness  Danielle Parisi is a 73 y.o. female presenting to emergency department from skilled nursing facility for evaluation of increasing shortness of breath and volume overload.  Patient has a PICC line and is on IV Zosyn for recurrent UTI.  Over the past few days she has had a worsening shortness of breath.  She does admit to increasing lower extremity edema.  Patient is basically bedbound, denies fever but does admit to a cough.  Patient denies recent illness, nausea, vomiting, diarrhea.    In ED, , glucose 183, BUN 59, creatinine 1.46, GFR 38, AST 8, lactate 3.6 with a repeat pending.  WBCs 14.6, hemoglobin 8.3, hematocrit 29.7, troponin 17 with a repeat pending.  Chest x-ray completed showing moderate left pleural effusion with left basilar atelectasis, mild right basilar atelectasis per radiology review.  Last echocardiogram on file 2/27/2023 showing an ejection fraction of 60 to 65% with a dilated right atrium and severe tricuspid regurgitation.  EKG showing A-fib at a rate of 90 without ST elevation or depression per ER physician review.  Patient given Lasix IV in ED.  Consult placed to cardiology.  Patient admitted to telemetry under the care of Dr. Carcamo who will continue to follow.  I was asked to do H&P and place initial admission orders.     Past Medical History  Hypertension, A-fib on Eliquis, CONOR, hypothyroidism, lymphedema, diabetes, hyperlipidemia, history of DVT, obesity    Surgical History  Patient denies     Social History  She has no history on file for tobacco use, alcohol use, and drug use.    Family History  Reviewed and noncontributory     Allergies  Penicillins and Sulfa (sulfonamide antibiotics)    Review of Systems  A 10 point review of systems was completed and negative except what is listed in HPI  Physical Exam  Constitutional:       Appearance: She is obese.   HENT:      Mouth/Throat:      Mouth: Mucous membranes are dry.      Pharynx: Oropharynx is clear.  "  Eyes:      Pupils: Pupils are equal, round, and reactive to light.   Cardiovascular:      Rate and Rhythm: Rhythm irregularly irregular.   Pulmonary:      Breath sounds: Rales present.   Abdominal:      General: Bowel sounds are normal.   Musculoskeletal:         General: Normal range of motion.      Cervical back: Normal range of motion.   Skin:     General: Skin is warm and dry.      Capillary Refill: Capillary refill takes less than 2 seconds.   Neurological:      General: No focal deficit present.      Mental Status: She is alert. Mental status is at baseline.   Psychiatric:         Mood and Affect: Mood normal.         Behavior: Behavior normal.          Last Recorded Vitals  Blood pressure 102/56, pulse 104, temperature 35.8 °C (96.4 °F), resp. rate 18, height 1.702 m (5' 7\"), weight (!) 168 kg (370 lb 6 oz), SpO2 97 %.    Relevant Results  XR chest 1 view    Result Date: 11/10/2023  STUDY: Chest Radiograph;  11/09/2023 10:56 PM INDICATION: Shortness of breath. COMPARISON: XR chest 03/16/2023, 03/15/2023.  ACCESSION NUMBER(S): [id number] ORDERING CLINICIAN: SACHIN WELDON TECHNIQUE:  Frontal chest was obtained at 2229 hours. FINDINGS: CARDIOMEDIASTINAL SILHOUETTE: Cardiomediastinal silhouette is markedly enlarged and stable in size and configuration.  LUNGS: Left-sided PICC line is noted terminating in the region of the proximal SVC/left subclavian junction.  Mild right basilar atelectasis.  Moderate left pleural effusion is suggested with underlying left basilar atelectasis and/or consolidation.  ABDOMEN: No remarkable upper abdominal findings.  BONES: No acute osseous changes.    Moderate left pleural effusion with left basilar atelectasis and/or consolidation.  Mild right basilar atelectasis. Signed by Nakul Rucker MD         Results for orders placed or performed during the hospital encounter of 11/09/23 (from the past 24 hour(s))   CBC and Auto Differential   Result Value Ref Range    WBC 14.6 (H) " 4.4 - 11.3 x10*3/uL    nRBC 0.3 (H) 0.0 - 0.0 /100 WBCs    RBC 3.15 (L) 4.00 - 5.20 x10*6/uL    Hemoglobin 8.3 (L) 12.0 - 16.0 g/dL    Hematocrit 29.7 (L) 36.0 - 46.0 %    MCV 94 80 - 100 fL    MCH 26.3 26.0 - 34.0 pg    MCHC 27.9 (L) 32.0 - 36.0 g/dL    RDW 17.8 (H) 11.5 - 14.5 %    Platelets 306 150 - 450 x10*3/uL    Neutrophils % 92.8 40.0 - 80.0 %    Immature Granulocytes %, Automated 1.0 (H) 0.0 - 0.9 %    Lymphocytes % 2.6 13.0 - 44.0 %    Monocytes % 3.4 2.0 - 10.0 %    Eosinophils % 0.1 0.0 - 6.0 %    Basophils % 0.1 0.0 - 2.0 %    Neutrophils Absolute 13.55 (H) 1.60 - 5.50 x10*3/uL    Immature Granulocytes Absolute, Automated 0.15 0.00 - 0.50 x10*3/uL    Lymphocytes Absolute 0.38 (L) 0.80 - 3.00 x10*3/uL    Monocytes Absolute 0.50 0.05 - 0.80 x10*3/uL    Eosinophils Absolute 0.01 0.00 - 0.40 x10*3/uL    Basophils Absolute 0.02 0.00 - 0.10 x10*3/uL   Magnesium   Result Value Ref Range    Magnesium 1.67 1.60 - 2.40 mg/dL   Comprehensive metabolic panel   Result Value Ref Range    Glucose 183 (H) 74 - 99 mg/dL    Sodium 142 136 - 145 mmol/L    Potassium 4.0 3.5 - 5.3 mmol/L    Chloride 102 98 - 107 mmol/L    Bicarbonate 32 21 - 32 mmol/L    Anion Gap 12 10 - 20 mmol/L    Urea Nitrogen 59 (H) 6 - 23 mg/dL    Creatinine 1.46 (H) 0.50 - 1.05 mg/dL    eGFR 38 (L) >60 mL/min/1.73m*2    Calcium 8.5 (L) 8.6 - 10.3 mg/dL    Albumin 3.0 (L) 3.4 - 5.0 g/dL    Alkaline Phosphatase 89 33 - 136 U/L    Total Protein 5.6 (L) 6.4 - 8.2 g/dL    AST 8 (L) 9 - 39 U/L    Bilirubin, Total 0.3 0.0 - 1.2 mg/dL    ALT 25 7 - 45 U/L   Protime-INR   Result Value Ref Range    Protime 11.6 9.8 - 12.8 seconds    INR 1.0 0.9 - 1.1   aPTT   Result Value Ref Range    aPTT 30 27 - 38 seconds   Lactate   Result Value Ref Range    Lactate 3.6 (H) 0.4 - 2.0 mmol/L   Type And Screen   Result Value Ref Range    ABO TYPE O     Rh TYPE POS     ANTIBODY SCREEN NEG    Troponin I, High Sensitivity   Result Value Ref Range    Troponin I, High  Sensitivity 17 (H) 0 - 13 ng/L   B-Type Natriuretic Peptide   Result Value Ref Range     (H) 0 - 99 pg/mL   Blood Gas Venous   Result Value Ref Range    POCT pH, Venous 7.30 (L) 7.33 - 7.43 pH    POCT pCO2, Venous 64 (H) 41 - 51 mm Hg    POCT pO2, Venous 36 35 - 45 mm Hg    POCT SO2, Venous 61 45 - 75 %    POCT Oxy Hemoglobin, Venous 59.0 45.0 - 75.0 %    POCT Base Excess, Venous 3.2 (H) -2.0 - 3.0 mmol/L    POCT HCO3 Calculated, Venous 31.5 (H) 22.0 - 26.0 mmol/L    Patient Temperature 37.0 degrees Celsius    FiO2 36 %    Test Comment ICU-S    Urinalysis with Reflex Microscopic and Culture   Result Value Ref Range    Color, Urine Yellow Straw, Yellow    Appearance, Urine Hazy (N) Clear    Specific Gravity, Urine 1.015 1.005 - 1.035    pH, Urine 5.0 5.0, 5.5, 6.0, 6.5, 7.0, 7.5, 8.0    Protein, Urine NEGATIVE NEGATIVE mg/dL    Glucose, Urine NEGATIVE NEGATIVE mg/dL    Blood, Urine MODERATE (2+) (A) NEGATIVE    Ketones, Urine NEGATIVE NEGATIVE mg/dL    Bilirubin, Urine NEGATIVE NEGATIVE    Urobilinogen, Urine <2.0 <2.0 mg/dL    Nitrite, Urine NEGATIVE NEGATIVE    Leukocyte Esterase, Urine LARGE (3+) (A) NEGATIVE   Microscopic Only, Urine   Result Value Ref Range    WBC, Urine >50 (A) 1-5, NONE /HPF    WBC Clumps, Urine FEW Reference range not established. /HPF    RBC, Urine >20 (A) NONE, 1-2, 3-5 /HPF    Squamous Epithelial Cells, Urine 1-9 (SPARSE) Reference range not established. /HPF    Transitional Epithelial Cells, Urine 1-2 (FEW) Reference range not established. /HPF    Bacteria, Urine 1+ (A) NONE SEEN /HPF    Budding Yeast, Urine PRESENT (A) NONE /HPF    Mucus, Urine 1+ Reference range not established. /LPF    Hyaline Casts, Urine 2+ (A) NONE /LPF       Assessment/Plan   Danielle is a 73-year-old female patient presenting to emergency department for evaluation of shortness of breath and volume overload.  Patient found to have a BNP of 116, elevated troponin, elevated lactate, elevated WBCs.  Imaging  showing moderate left pleural effusion with left basilar atelectasis and mild right basilar atelectasis.  Last echocardiogram on file 2/27/2023 with an EF of 60 to 65%.  Patient having new oxygen requirements moving from 2 L/min to 4 L/min.  Patient currently receiving IV Zosyn via PICC line at [facility] for recurrent UTI.  Patient found to be UTI positive, no culture sent due to being on antibiotics chronically.  Patient given IV Lasix for volume overload in ED.  Consult placed to cardiology.  Admission for further medical management.    Acute decompensated heart failure  Admit to telemetry per Dr. Carcamo  Intake and output  See imaging results above  Daily weight  Lasix IV 40 mg in ED  Consult cardiology and appreciate input  Low-sodium diet  Fluid restriction  Chronic Cruz catheter    Diabetes/hypothyroidism/hypertension/A-fib/hyperlipidemia   Diabetic diet  ISS  Hypoglycemia protocol  Hold home oral antidiabetic medications when med rec is complete  Continue home medications  Continue home Eliquis when med rec is complete    DVT PPx  Continue home Eliquis when med rec is complete  SCDs  Activity as tolerated    I spent 30 minutes in the professional and overall care of this patient.  Patient fully evaluated on November, plan as above.    Cheyenne Brothers, APRN-CNP

## 2023-11-10 NOTE — ED PROVIDER NOTES
HPI   No chief complaint on file.      Patient is a 73-year-old female, medical history significant for CHF, A-fib, venous stasis, chronic urinary retention with Cruz catheter, who presents to the emergency department with complaint of increasing shortness of breath and volume overload.  Patient is currently at a nursing facility.  She is on IV Zosyn for history of UTI.  Over the past few days, she has had increasing shortness of breath.  She does admit to increasing edema.  Patient is basically bedbound.  She denies fever but does admit to cough.                          Radha Coma Scale Score: 15                  Patient History   No past medical history on file.  No past surgical history on file.  No family history on file.  Social History     Tobacco Use    Smoking status: Not on file    Smokeless tobacco: Not on file   Substance Use Topics    Alcohol use: Not on file    Drug use: Not on file       Physical Exam   ED Triage Vitals   Temp Pulse Resp BP   -- -- -- --      SpO2 Temp src Heart Rate Source Patient Position   -- -- -- --      BP Location FiO2 (%)     -- --       Physical Exam  Vitals and nursing note reviewed.   Constitutional:       General: She is not in acute distress.     Appearance: She is well-developed. She is obese.   HENT:      Head: Normocephalic and atraumatic.   Eyes:      Extraocular Movements: Extraocular movements intact.      Conjunctiva/sclera: Conjunctivae normal.      Pupils: Pupils are equal, round, and reactive to light.   Cardiovascular:      Rate and Rhythm: Regular rhythm. Tachycardia present.      Heart sounds: No murmur heard.  Pulmonary:      Effort: Pulmonary effort is normal. No respiratory distress.      Breath sounds: Rales present.   Abdominal:      General: Abdomen is flat.      Palpations: Abdomen is soft.      Tenderness: There is no abdominal tenderness.   Musculoskeletal:         General: No swelling.      Cervical back: Normal range of motion and neck supple.  No rigidity.      Right lower leg: Edema present.      Left lower leg: Edema present.   Skin:     General: Skin is warm and dry.      Capillary Refill: Capillary refill takes less than 2 seconds.   Neurological:      General: No focal deficit present.      Mental Status: She is alert and oriented to person, place, and time.   Psychiatric:         Mood and Affect: Mood normal.         ED Course & MDM   ED Course as of 11/10/23 0020   Thu Nov 09, 2023 2242 EKG demonstrates atrial fibrillation.  There is normal axis.  There is a rate of 100.  There is no acute ischemia.  There is no STEMI. [MK]   2307 Blood gas does show very mild hypercapnia.  Mild acidosis. [MK]   2307 CBC demonstrates leukocytosis of 14.6.  Anemia of 8.3. [MK]   2310 Coags unremarkable. [MK]   2319 Lactic acid mildly elevated at 3.6. [MK]   2320 Metabolic panel does demonstrate stable chronic kidney disease.  BUN is also stable from earlier today.  Potassium is normal.  Magnesium is normal. [MK]   2352 Urine does show persistent evidence of infection. [MK]   2352 The patient's elevated lactic acid, I am still hesitant to give her fluids as she has significant clinical signs of volume overload and needs diuresis. [MK]      ED Course User Index  [MK] Mark Carrillo MD         Diagnoses as of 11/10/23 0020   Acute decompensated heart failure (CMS/HCC)   Hypoxia   Chronic atrial fibrillation (CMS/HCC)   Urinary tract infection associated with indwelling urethral catheter, initial encounter (CMS/[facility])       Medical Decision Making  Medical Decision Making: Patient presents emergency department shortness of breath.  On arrival, she has had to increase her baseline oxygen from 2 to 4 L.  Metabolic work-up was pursued.  Patient does have evidence of diffuse volume overload.  She does have a PICC and has been getting IV antibiotics.  For this reason, I did not obtain blood cultures.  She is also not had a fever.  Labs are obtained and reviewed.  X-ray  does show evidence of volume overload.  Patient was started on IV diuresis.  Heart rate is in the 90s.  Blood pressure has been stable.  She is comfortable on 4 L.  At this point, due to the patient requires admission.    EKG interpreted by myself (ED attending physician): Reviewed    Differential Diagnoses Considered: A-fib with RVR, hypoxia, CHF, sepsis    Chronic Medical Conditions Significantly Affecting Care: History of atrial fibrillation, CHF, PICC for recurrent urinary tract infection    External Records Reviewed: I reviewed recent and relevant outside records including: Medication reconciliation, recent admission and discharge summary    Independent Interpretation of Studies:  I independently interpreted: [Chest x-ray demonstrates mild enlarged cardiac silhouette, bilateral effusions, cephalization, no infiltrates    Escalation of Care:  Appropriate for hospitalization    Social Determinants of Health Significantly Affecting Care:  No social determinants    Prescription Drug Consideration: IV diuretics    Diagnostic testing considered: [PERC, D-Dimer, PECARN, etc.]    Discussion of Management with Other Providers:   I discussed the patient/results with: Admitting provider agrees plan of care          Procedure  Procedures     Mark Carrillo MD  11/10/23 0020

## 2023-11-10 NOTE — CARE PLAN
The patient's goals for the shift include  rest and comfort    The clinical goals for the shift include medication reconcilement

## 2023-11-10 NOTE — PROGRESS NOTES
Physical Therapy    Discharge Summary    Name: Danielle Parisi  MRN: [id number]  : 1950  Date: 11/10/23    Discharge Summary: PT    Discharge Information: Date of discharge 11/10/23      Rehab Discharge Reason: Pt confirms she is an ECF resident x >5yrs and bedbound. Pt requires assist for all daily needs and presents near functional baseline. No further acute PT needs. Will d/c from caseload.

## 2023-11-10 NOTE — PROGRESS NOTES
11/10/23 1314   Discharge Planning   Type of Post Acute Facility Services Long term care   Patient expects to be discharged to: [facility]   Does the patient need discharge transport arranged? Yes   RoundTrip coordination needed? Yes     1310:  Referral will be submitted to the Ave at [facility] to review for return.    1352:  The [facility] can accept.  Patient is a BH will not need precert to return.

## 2023-11-10 NOTE — PROGRESS NOTES
Occupational Therapy    Discharge Summary    Name: Danielle Parisi  MRN: [id number]  : 1950  Date: 11/10/23      Rehab Discharge Reason: Patient reports she is resident at [facility], bedbound x many years, required extensive assist for all ADLs at baseline. Patient not appropriate for skilled O.T. services secondary to baseline status; discharge O.T. orders.

## 2023-11-10 NOTE — CARE PLAN
The patient's goals for the shift include      The clinical goals for the shift include To report less short of breath during shift

## 2023-11-10 NOTE — PROGRESS NOTES
11/10/23 1223   Discharge Planning   Living Arrangements Other (Comment)   Type of Residence Nursing home/residential care   Home or Post Acute Services Post acute facilities (Rehab/SNF/etc)   Type of Post Acute Facility Services Long term care   Patient expects to be discharged to: [facility]   Does the patient need discharge transport arranged? Yes   Patient Choice   Patient / Family choosing to utilize agency / facility established prior to hospitalization Yes     Met with patient at bedside, introduced self and role on care transitions team. Patient states prior to admission she was at [facility] since March 2023. Confirmed preference to return at discharge. PCN updated.   Insurance: [facility] Dual Complete  Emergency contact is patients sidney Gupta [phone]

## 2023-11-10 NOTE — PROGRESS NOTES
Spiritual Care Visit    Clinical Encounter Type  Visited With: Patient  Routine Visit: Introduction  Continue Visiting: Yes    Evangelical Encounters  Evangelical Needs: Spiritual care brochure, Prayer     found patient in fragile condition, cogent and minimally verbal.  Patient embraced spiritual care as evidenced by expressing thanks for prayer and blessing.  Additional visits advised.

## 2023-11-10 NOTE — CONSULTS
Cardiology Consult    Impression:  HFpEF.  Acute on chronic.  Predominantly right heart failure.  Echocardiogram February 2023 showing normal EF, dilated RV with RV dysfunction, severe TR, RVSP 60.  Permanent A-fib.  Rate controlled with Cardizem and metoprolol.  Anticoagulated with Eliquis.  [id number] =5.  Chronic lymphedema  Urinary retention with chronic indwelling catheter.  Recurrent UTIs.  Anemia  Hypertension  Hyperlipidemia  Diabetes  Hypothyroid  CKD  Plan:  Very difficult fluid balance given morbid obesity, OHA, FRANC, right heart failure, TR, pulmonary hypertension and preload dependence in a patient that is bedbound.    Recommend gentle diuresis with IV Lasix.  Follow labs  Continue Cardizem and metoprolol for rate control  Continue Eliquis  CC  Shortness of breath  HPI:  73-year-old woman transferred from her nursing home for evaluation of shortness of breath and increasing swelling.  Patient is bedbound.  She is on antibiotics for UTI in the setting of a chronic indwelling Cruz catheter and urinary retention.  In recent weeks has been more short of breath and swollen.  Was therefore sent to ER.  Chest x-ray showed pulmonary edema.  .  She was given a dose of IV Lasix.  Breathing better this morning but not yet back to baseline.  Meds:  Scheduled medications  apixaban, 5 mg, oral, BID  busPIRone, 5 mg, oral, BID  dilTIAZem CD, 240 mg, oral, Daily  gabapentin, 100 mg, oral, Daily  insulin lispro, 0-5 Units, subcutaneous, TID with meals  lactobacillus acidophilus, 1 tablet, oral, Daily  [START ON 11/13/2023] levothyroxine, 100 mcg, oral, Once per day on Mon  [START ON 11/11/2023] levothyroxine, 50 mcg, oral, Once per day on Sun Tue Wed Thu Fri Sat  metoprolol tartrate, 25 mg, oral, BID  pantoprazole, 40 mg, oral, Daily  piperacillin-tazobactam, 3.375 g, intravenous, q6h  polyethylene glycol, 17 g, oral, Daily  potassium chloride CR, 20 mEq, oral, Daily  predniSONE, 20 mg, oral, Daily  torsemide,  20 mg, oral, Daily      Continuous medications     PRN medications  PRN medications: acetaminophen, bisacodyl, dextrose 10 % in water (D10W), dextrose, glucagon, LORazepam, nystatin, oxygen, traMADol    PMHx:  HFpEF  A-fib  FRANC  OHA  CKD  Chronic UTIs  Social history:  Nursing home.  No cigarettes or alcohol  Family history:  Noncontributory  Review of systems:  See HPI  Physical exam:  Vitals:    11/10/23 1158   BP: 108/56   Pulse: 89   Resp: 18   Temp: 35.5 °C (95.9 °F)   SpO2: 98%      Chronically ill-appearing.  No distress.  JVP not visible.  Heart sounds are distant difficult to hear.  I could hear no murmurs.  Chest exam reveals very poor air entry bilaterally.  Abdomen soft.  Grossly edematous.  EKG:  A-fib.  Poor R wave progression.  IVCD.  Echo:  February 2023 showing normal EF, dilated RV with RV dysfunction, severe TR, RVSP 60.   Labs:  Lab Results   Component Value Date    WBC 14.6 (H) 11/09/2023    HGB 8.3 (L) 11/09/2023    HCT 29.7 (L) 11/09/2023     11/09/2023    CHOL 98 11/14/2022    TRIG 95 11/14/2022    HDL 38.6 (A) 11/14/2022    ALT 25 11/09/2023    AST 8 (L) 11/09/2023     11/09/2023    K 4.0 11/09/2023     11/09/2023    CREATININE 1.46 (H) 11/09/2023    BUN 59 (H) 11/09/2023    CO2 32 11/09/2023    TSH 1.27 02/27/2023    INR 1.0 11/09/2023    HGBA1C 6.5 (A) 02/14/2023     par

## 2023-11-11 LAB
ALBUMIN SERPL BCP-MCNC: 2.8 G/DL (ref 3.4–5)
ALP SERPL-CCNC: 66 U/L (ref 33–136)
ALT SERPL W P-5'-P-CCNC: 19 U/L (ref 7–45)
ANION GAP SERPL CALC-SCNC: 8 MMOL/L (ref 10–20)
AST SERPL W P-5'-P-CCNC: 6 U/L (ref 9–39)
BACTERIA UR CULT: ABNORMAL
BILIRUB SERPL-MCNC: 0.2 MG/DL (ref 0–1.2)
BUN SERPL-MCNC: 62 MG/DL (ref 6–23)
CALCIUM SERPL-MCNC: 8.3 MG/DL (ref 8.6–10.3)
CHLORIDE SERPL-SCNC: 102 MMOL/L (ref 98–107)
CO2 SERPL-SCNC: 36 MMOL/L (ref 21–32)
CREAT SERPL-MCNC: 1.52 MG/DL (ref 0.5–1.05)
ERYTHROCYTE [DISTWIDTH] IN BLOOD BY AUTOMATED COUNT: 17.6 % (ref 11.5–14.5)
GFR SERPL CREATININE-BSD FRML MDRD: 36 ML/MIN/1.73M*2
GLUCOSE BLD MANUAL STRIP-MCNC: 142 MG/DL (ref 74–99)
GLUCOSE BLD MANUAL STRIP-MCNC: 208 MG/DL (ref 74–99)
GLUCOSE BLD MANUAL STRIP-MCNC: 209 MG/DL (ref 74–99)
GLUCOSE BLD MANUAL STRIP-MCNC: 231 MG/DL (ref 74–99)
GLUCOSE BLD MANUAL STRIP-MCNC: 300 MG/DL (ref 74–99)
GLUCOSE SERPL-MCNC: 150 MG/DL (ref 74–99)
HCT VFR BLD AUTO: 27.4 % (ref 36–46)
HGB BLD-MCNC: 7.4 G/DL (ref 12–16)
MCH RBC QN AUTO: 26.1 PG (ref 26–34)
MCHC RBC AUTO-ENTMCNC: 27 G/DL (ref 32–36)
MCV RBC AUTO: 97 FL (ref 80–100)
NRBC BLD-RTO: 0.3 /100 WBCS (ref 0–0)
PLATELET # BLD AUTO: 221 X10*3/UL (ref 150–450)
POTASSIUM SERPL-SCNC: 4.5 MMOL/L (ref 3.5–5.3)
PROT SERPL-MCNC: 5.2 G/DL (ref 6.4–8.2)
RBC # BLD AUTO: 2.83 X10*6/UL (ref 4–5.2)
SODIUM SERPL-SCNC: 141 MMOL/L (ref 136–145)
WBC # BLD AUTO: 11.6 X10*3/UL (ref 4.4–11.3)

## 2023-11-11 PROCEDURE — 2500000004 HC RX 250 GENERAL PHARMACY W/ HCPCS (ALT 636 FOR OP/ED): Performed by: NURSE PRACTITIONER

## 2023-11-11 PROCEDURE — 82947 ASSAY GLUCOSE BLOOD QUANT: CPT | Mod: 59

## 2023-11-11 PROCEDURE — 99233 SBSQ HOSP IP/OBS HIGH 50: CPT | Performed by: INTERNAL MEDICINE

## 2023-11-11 PROCEDURE — 80053 COMPREHEN METABOLIC PANEL: CPT | Performed by: INTERNAL MEDICINE

## 2023-11-11 PROCEDURE — 2500000004 HC RX 250 GENERAL PHARMACY W/ HCPCS (ALT 636 FOR OP/ED): Performed by: INTERNAL MEDICINE

## 2023-11-11 PROCEDURE — 2500000001 HC RX 250 WO HCPCS SELF ADMINISTERED DRUGS (ALT 637 FOR MEDICARE OP): Performed by: INTERNAL MEDICINE

## 2023-11-11 PROCEDURE — 2060000001 HC INTERMEDIATE ICU ROOM DAILY

## 2023-11-11 PROCEDURE — 2500000002 HC RX 250 W HCPCS SELF ADMINISTERED DRUGS (ALT 637 FOR MEDICARE OP, ALT 636 FOR OP/ED): Performed by: NURSE PRACTITIONER

## 2023-11-11 PROCEDURE — 2500000001 HC RX 250 WO HCPCS SELF ADMINISTERED DRUGS (ALT 637 FOR MEDICARE OP): Performed by: NURSE PRACTITIONER

## 2023-11-11 PROCEDURE — 85027 COMPLETE CBC AUTOMATED: CPT | Performed by: INTERNAL MEDICINE

## 2023-11-11 RX ADMIN — PIPERACILLIN SODIUM AND TAZOBACTAM SODIUM 3.38 G: 3; .375 INJECTION, SOLUTION INTRAVENOUS at 08:43

## 2023-11-11 RX ADMIN — PIPERACILLIN SODIUM AND TAZOBACTAM SODIUM 3.38 G: 3; .375 INJECTION, SOLUTION INTRAVENOUS at 21:08

## 2023-11-11 RX ADMIN — FUROSEMIDE 20 MG: 10 INJECTION, SOLUTION INTRAMUSCULAR; INTRAVENOUS at 16:06

## 2023-11-11 RX ADMIN — INSULIN LISPRO 2 UNITS: 100 INJECTION, SOLUTION INTRAVENOUS; SUBCUTANEOUS at 16:25

## 2023-11-11 RX ADMIN — POTASSIUM CHLORIDE 20 MEQ: 1500 TABLET, EXTENDED RELEASE ORAL at 08:44

## 2023-11-11 RX ADMIN — FUROSEMIDE 20 MG: 10 INJECTION, SOLUTION INTRAMUSCULAR; INTRAVENOUS at 01:13

## 2023-11-11 RX ADMIN — PREDNISONE 20 MG: 20 TABLET ORAL at 08:44

## 2023-11-11 RX ADMIN — INSULIN LISPRO 2 UNITS: 100 INJECTION, SOLUTION INTRAVENOUS; SUBCUTANEOUS at 12:23

## 2023-11-11 RX ADMIN — PIPERACILLIN SODIUM AND TAZOBACTAM SODIUM 3.38 G: 3; .375 INJECTION, SOLUTION INTRAVENOUS at 02:05

## 2023-11-11 RX ADMIN — APIXABAN 5 MG: 5 TABLET, FILM COATED ORAL at 08:44

## 2023-11-11 RX ADMIN — METOPROLOL TARTRATE 25 MG: 25 TABLET, FILM COATED ORAL at 08:44

## 2023-11-11 RX ADMIN — APIXABAN 5 MG: 5 TABLET, FILM COATED ORAL at 21:07

## 2023-11-11 RX ADMIN — Medication 1 TABLET: at 08:45

## 2023-11-11 RX ADMIN — Medication 10 DROP: at 21:08

## 2023-11-11 RX ADMIN — Medication 10 DROP: at 16:23

## 2023-11-11 RX ADMIN — BUSPIRONE HYDROCHLORIDE 5 MG: 5 TABLET ORAL at 21:07

## 2023-11-11 RX ADMIN — DILTIAZEM HYDROCHLORIDE 240 MG: 240 CAPSULE, COATED, EXTENDED RELEASE ORAL at 08:44

## 2023-11-11 RX ADMIN — BUSPIRONE HYDROCHLORIDE 5 MG: 5 TABLET ORAL at 08:44

## 2023-11-11 RX ADMIN — PIPERACILLIN SODIUM AND TAZOBACTAM SODIUM 3.38 G: 3; .375 INJECTION, SOLUTION INTRAVENOUS at 16:06

## 2023-11-11 RX ADMIN — LEVOTHYROXINE SODIUM 50 MCG: 100 TABLET ORAL at 06:18

## 2023-11-11 RX ADMIN — GABAPENTIN 100 MG: 100 CAPSULE ORAL at 08:43

## 2023-11-11 RX ADMIN — PANTOPRAZOLE SODIUM 40 MG: 40 TABLET, DELAYED RELEASE ORAL at 08:44

## 2023-11-11 ASSESSMENT — COGNITIVE AND FUNCTIONAL STATUS - GENERAL
WALKING IN HOSPITAL ROOM: TOTAL
MOBILITY SCORE: 6
TOILETING: TOTAL
STANDING UP FROM CHAIR USING ARMS: TOTAL
EATING MEALS: A LITTLE
STANDING UP FROM CHAIR USING ARMS: TOTAL
TOILETING: TOTAL
HELP NEEDED FOR BATHING: TOTAL
DAILY ACTIVITIY SCORE: 8
DRESSING REGULAR LOWER BODY CLOTHING: TOTAL
DAILY ACTIVITIY SCORE: 8
MOVING FROM LYING ON BACK TO SITTING ON SIDE OF FLAT BED WITH BEDRAILS: TOTAL
CLIMB 3 TO 5 STEPS WITH RAILING: TOTAL
WALKING IN HOSPITAL ROOM: TOTAL
EATING MEALS: A LITTLE
PERSONAL GROOMING: TOTAL
HELP NEEDED FOR BATHING: TOTAL
CLIMB 3 TO 5 STEPS WITH RAILING: TOTAL
TURNING FROM BACK TO SIDE WHILE IN FLAT BAD: TOTAL
DRESSING REGULAR UPPER BODY CLOTHING: TOTAL
TURNING FROM BACK TO SIDE WHILE IN FLAT BAD: TOTAL
MOVING FROM LYING ON BACK TO SITTING ON SIDE OF FLAT BED WITH BEDRAILS: TOTAL
MOVING TO AND FROM BED TO CHAIR: TOTAL
MOBILITY SCORE: 6
DRESSING REGULAR UPPER BODY CLOTHING: TOTAL
DRESSING REGULAR LOWER BODY CLOTHING: TOTAL
MOVING TO AND FROM BED TO CHAIR: TOTAL
PERSONAL GROOMING: TOTAL

## 2023-11-11 ASSESSMENT — PAIN - FUNCTIONAL ASSESSMENT: PAIN_FUNCTIONAL_ASSESSMENT: 0-10

## 2023-11-11 ASSESSMENT — PAIN SCALES - GENERAL
PAINLEVEL_OUTOF10: 0 - NO PAIN
PAINLEVEL_OUTOF10: 0 - NO PAIN

## 2023-11-11 NOTE — CARE PLAN
The patient's goals for the shift include  rest and comfort    The clinical goals for the shift include rest and comfort

## 2023-11-11 NOTE — PROGRESS NOTES
Subjective Data:  Patient denies any chest discomfort.  Admits to mild shortness of breath.  Still admits to significant lower extremity edema.    Overnight Events:    No other events overnight.     Objective Data:  Last Recorded Vitals:  Vitals:    11/10/23 1616 11/10/23 2011 11/10/23 2347 11/11/23 0331   BP: 122/61 94/58 104/68 112/59   Pulse: 76 87 80 78   Resp: 18 21 16 18   Temp: 35.2 °C (95.4 °F) 35.8 °C (96.4 °F) 35.6 °C (96.1 °F) 36.1 °C (97 °F)   TempSrc:  Temporal Temporal Temporal   SpO2: 100% 95% 96% 100%   Weight:       Height:           Last Labs:  CBC - 11/11/2023:  4:54 AM  11.6 7.4 221    27.4      CMP - 11/11/2023:  4:54 AM  8.3 5.2 6 --- 0.2   3.2 2.8 19 66      PTT - 11/9/2023: 10:46 PM  1.0   11.6 30     TROPHS   Date/Time Value Ref Range Status   11/10/2023 06:08 AM 18 0 - 13 ng/L Final   11/09/2023 10:46 PM 17 0 - 13 ng/L Final   03/16/2023 05:23 AM 10 0 - 13 ng/L Final     Comment:     .  Less than 99th percentile of normal range cutoff-  Female and children under 18 years old <14 ng/L; Male <21 ng/L: Negative  Repeat testing should be performed if clinically indicated.   .  Female and children under 18 years old 14-50 ng/L; Male 21-50 ng/L:  Consistent with possible cardiac damage and possible increased clinical   risk. Serial measurements may help to assess extent of myocardial damage.   .  >50 ng/L: Consistent with cardiac damage, increased clinical risk and  myocardial infarction. Serial measurements may help assess extent of   myocardial damage.   .   NOTE: Children less than 1 year old may have higher baseline troponin   levels and results should be interpreted in conjunction with the overall   clinical context.   .  NOTE: Troponin I testing is performed using a different   testing methodology at [facility] than at other   [facility]. Direct result comparisons should only   be made within the same method.     03/15/2023 09:14 PM 8 0 - 13 ng/L Final     Comment:      .  Less than 99th percentile of normal range cutoff-  Female and children under 18 years old <14 ng/L; Male <21 ng/L: Negative  Repeat testing should be performed if clinically indicated.   .  Female and children under 18 years old 14-50 ng/L; Male 21-50 ng/L:  Consistent with possible cardiac damage and possible increased clinical   risk. Serial measurements may help to assess extent of myocardial damage.   .  >50 ng/L: Consistent with cardiac damage, increased clinical risk and  myocardial infarction. Serial measurements may help assess extent of   myocardial damage.   .   NOTE: Children less than 1 year old may have higher baseline troponin   levels and results should be interpreted in conjunction with the overall   clinical context.   .  NOTE: Troponin I testing is performed using a different   testing methodology at [facility] than at other   [facility] hospitals. Direct result comparisons should only   be made within the same method.     02/27/2023 02:11 PM 60 0 - 13 ng/L Final     Comment:     .  Less than 99th percentile of normal range cutoff-  Female and children under 18 years old <14 ng/L; Male <21 ng/L: Negative  Repeat testing should be performed if clinically indicated.   .  Female and children under 18 years old 14-50 ng/L; Male 21-50 ng/L:  Consistent with possible cardiac damage and possible increased clinical   risk. Serial measurements may help to assess extent of myocardial damage.   .  >50 ng/L: Consistent with cardiac damage, increased clinical risk and  myocardial infarction. Serial measurements may help assess extent of   myocardial damage.   .   NOTE: Children less than 1 year old may have higher baseline troponin   levels and results should be interpreted in conjunction with the overall   clinical context.   .  NOTE: Troponin I testing is performed using a different   testing methodology at [facility] than at other   [facility] hospitals. Direct result comparisons  should only   be made within the same method.  This is a critical result.    Per Laboratory policy, critical results for this test   only qualify to the call list once per 24 hours.        BNP   Date/Time Value Ref Range Status   11/09/2023 10:46  0 - 99 pg/mL Final   03/15/2023 09:14  0 - 99 pg/mL Final     Comment:     .  <100 pg/mL - Heart failure unlikely  100-299 pg/mL - Intermediate probability of acute heart  .               failure exacerbation. Correlate with clinical  .               context and patient history.    >=300 pg/mL - Heart Failure likely. Correlate with clinical  .               context and patient history.  BNP testing is performed using different testing   methodology at [facility] than at other   system hospitals. Direct result comparisons should   only be made within the same method.     02/26/2023 10:36  0 - 99 pg/mL Final     Comment:     .  <100 pg/mL - Heart failure unlikely  100-299 pg/mL - Intermediate probability of acute heart  .               failure exacerbation. Correlate with clinical  .               context and patient history.    >=300 pg/mL - Heart Failure likely. Correlate with clinical  .               context and patient history.  BNP testing is performed using different testing   methodology at [facility] than at other   system hospitals. Direct result comparisons should   only be made within the same method.       HGBA1C   Date/Time Value Ref Range Status   02/14/2023 06:00 AM 6.5 % Final     Comment:          Diagnosis of Diabetes-Adults   Non-Diabetic: < or = 5.6%   Increased risk for developing diabetes: 5.7-6.4%   Diagnostic of diabetes: > or = 6.5%  .       Monitoring of Diabetes                Age (y)     Therapeutic Goal (%)   Adults:          >18           <7.0   Pediatrics:    13-18           <7.5                   7-12           <8.0                   0- 6            7.5-8.5   American Diabetes Association.  "Diabetes Care 33(S1), Jan 2010.     11/14/2022 07:00 AM 6.4 % Final     Comment:          Diagnosis of Diabetes-Adults   Non-Diabetic: < or = 5.6%   Increased risk for developing diabetes: 5.7-6.4%   Diagnostic of diabetes: > or = 6.5%  .       Monitoring of Diabetes                Age (y)     Therapeutic Goal (%)   Adults:          >18           <7.0   Pediatrics:    13-18           <7.5                   7-12           <8.0                   0- 6            7.5-8.5   American Diabetes Association. Diabetes Care 33(S1), Jan 2010.       VLDL   Date/Time Value Ref Range Status   11/14/2022 07:00 AM 19 0 - 40 mg/dL Final   05/19/2022 07:00 AM 31 0 - 40 mg/dL Final   03/26/2021 06:00 AM 34 0 - 40 mg/dL Final      Last I/O:  I/O last 3 completed shifts:  In: 350 (2.3 mL/kg) [P.O.:200; IV Piggyback:150]  Out: 925 (6.2 mL/kg) [Urine:925 (0.2 mL/kg/hr)]  Dosing Weight: 150 kg     Past Cardiology Tests (Last 3 Years):  EKG:  No results found for this or any previous visit from the past 1095 days.    Echo:  2/27/2023  CONCLUSIONS:  1. Left ventricular systolic function is normal with a 60-65% estimated ejection fraction.  2. Moderately enlarged right ventricle.  3. There is low normal right ventricular systolic function.  4. The right atrium is severely dilated.  5. Moderate to severely elevated right ventricular systolic pressure.  6. Severe tricuspid regurgitation visualized.  7. Left ventricular cavity size is decreased.    Ejection Fractions:  No results found for: \"EF\"  Cath:  No results found for this or any previous visit from the past 1095 days.    Stress Test:  No results found for this or any previous visit from the past 1095 days.    Cardiac Imaging:  No results found for this or any previous visit from the past 1095 days.      Inpatient Medications:  Scheduled medications   Medication Dose Route Frequency    apixaban  5 mg oral BID    busPIRone  5 mg oral BID    dilTIAZem CD  240 mg oral Daily    furosemide  20 " mg intravenous q12h    gabapentin  100 mg oral Daily    insulin lispro  0-5 Units subcutaneous TID with meals    lactobacillus acidophilus  1 tablet oral Daily    [START ON 11/13/2023] levothyroxine  100 mcg oral Once per day on Mon    levothyroxine  50 mcg oral Once per day on Sun Tue Wed Thu Fri Sat    metoprolol tartrate  25 mg oral BID    pantoprazole  40 mg oral Daily    piperacillin-tazobactam  3.375 g intravenous q6h    polyethylene glycol  17 g oral Daily    potassium chloride CR  20 mEq oral Daily    predniSONE  20 mg oral Daily    [Held by provider] torsemide  20 mg oral Daily     PRN medications   Medication    acetaminophen    bisacodyl    dextrose 10 % in water (D10W)    dextrose    glucagon    LORazepam    nystatin    oxygen    traMADol    zinc oxide     Continuous Medications   Medication Dose Last Rate       Physical Exam:  Physical Exam  Vitals reviewed.   Constitutional:       Appearance: Normal appearance.   HENT:      Head: Normocephalic and atraumatic.      Mouth/Throat:      Mouth: Mucous membranes are moist.      Pharynx: Oropharynx is clear.   Cardiovascular:      Rate and Rhythm: Normal rate and regular rhythm.      Pulses: Normal pulses.      Heart sounds: Normal heart sounds.   Pulmonary:      Effort: Pulmonary effort is normal.      Breath sounds: Normal breath sounds.   Abdominal:      General: Bowel sounds are normal.      Palpations: Abdomen is soft.   Musculoskeletal:      Cervical back: Neck supple.      Right lower leg: Edema present.      Left lower leg: Edema present.   Skin:     General: Skin is warm and dry.   Neurological:      General: No focal deficit present.      Mental Status: She is alert.   Psychiatric:         Mood and Affect: Mood normal.         Behavior: Behavior normal.           Assessment/Plan   11/11/2023: Covering for Dr. Randolph  1.  HFpEF: Acute on chronic with predominant right-sided features.  Continue gentle diuresis since the patient remains total body  volume overloaded.  Creatinine is a little more elevated than before.  Continue to monitor.    2.  Permanent A-fib: Continue Eliquis, Cardizem and metoprolol  3.  Chronic lymphedema  4.  Anemia  5.  Hypertension  6.  Hyperlipidemia  7.  Diabetes  8.  Hypothyroidism  9.  CKD  10.  Urinary retention with chronic indwelling catheter with recurrent UTIs.  PICC - Adult Left Basilic vein (Active)   Line Necessity Intravenous medication therapy 11/11/23 1000   Number of days:        Urethral Catheter (Active)   Output (mL) 300 mL 11/11/23 0500   Number of days: 1       Code Status:  Full Code    Johann Verdugo MD

## 2023-11-11 NOTE — PROGRESS NOTES
Danielle Parisi is a 73 y.o. female on day 1 of admission presenting with Acute decompensated heart failure (CMS/HCC).      Subjective   Patient fully evaluated on November 11 and clinically improving.       Objective     Last Recorded Vitals  /79 (BP Location: Left arm, Patient Position: Lying)   Pulse 76   Temp 36.6 °C (97.9 °F)   Resp 18   Wt (!) 168 kg (370 lb 6 oz)   SpO2 93%   Intake/Output last 3 Shifts:    Intake/Output Summary (Last 24 hours) at 11/11/2023 1342  Last data filed at 11/11/2023 0600  Gross per 24 hour   Intake 300 ml   Output 675 ml   Net -375 ml       Admission Weight  Weight: 150 kg (330 lb) (11/09/23 2215)    Daily Weight  11/10/23 : (!) 168 kg (370 lb 6 oz)    Image Results  XR chest 1 view  Narrative: STUDY:  Chest Radiograph;  11/09/2023 10:56 PM   INDICATION:  Shortness of breath.  COMPARISON:  XR chest 03/16/2023, 03/15/2023.    ACCESSION NUMBER(S):  [id number]  ORDERING CLINICIAN:  SACHIN WELDON  TECHNIQUE:  Frontal chest was obtained at 2229 hours.  FINDINGS:  CARDIOMEDIASTINAL SILHOUETTE:  Cardiomediastinal silhouette is markedly enlarged and stable in size  and configuration.     LUNGS:  Left-sided PICC line is noted terminating in the region of the  proximal SVC/left subclavian junction.  Mild right basilar  atelectasis.  Moderate left pleural effusion is suggested with  underlying left basilar atelectasis and/or consolidation.     ABDOMEN:  No remarkable upper abdominal findings.     BONES:  No acute osseous changes.  Impression: Moderate left pleural effusion with left basilar atelectasis and/or  consolidation.  Mild right basilar atelectasis.  Signed by Nakul Rucker MD      Physical Exam    Relevant Results               Assessment/Plan   This patient currently has cardiac telemetry ordered; if you would like to modify or discontinue the telemetry order, click here to go to the orders activity to modify/discontinue the order.              Principal Problem:     Acute decompensated heart failure (CMS/[facility])          Thien Carcamo MD  Physician  Internal Medicine     H&P      Addendum     Date of Service: 11/10/2023  5:38 AM     Addendum       Expand All Collapse All    History Of Present Illness  Danielle Parisi is a 73 y.o. female presenting to emergency department from skilled nursing facility for evaluation of increasing shortness of breath and volume overload.  Patient has a PICC line and is on IV Zosyn for recurrent UTI.  Over the past few days she has had a worsening shortness of breath.  She does admit to increasing lower extremity edema.  Patient is basically bedbound, denies fever but does admit to a cough.  Patient denies recent illness, nausea, vomiting, diarrhea.     In ED, , glucose 183, BUN 59, creatinine 1.46, GFR 38, AST 8, lactate 3.6 with a repeat pending.  WBCs 14.6, hemoglobin 8.3, hematocrit 29.7, troponin 17 with a repeat pending.  Chest x-ray completed showing moderate left pleural effusion with left basilar atelectasis, mild right basilar atelectasis per radiology review.  Last echocardiogram on file 2/27/2023 showing an ejection fraction of 60 to 65% with a dilated right atrium and severe tricuspid regurgitation.  EKG showing A-fib at a rate of 90 without ST elevation or depression per ER physician review.  Patient given Lasix IV in ED.  Consult placed to cardiology.  Patient admitted to telemetry under the care of Dr. Carcamo who will continue to follow.  I was asked to do H&P and place initial admission orders.     Past Medical History  Hypertension, A-fib on Eliquis, CONOR, hypothyroidism, lymphedema, diabetes, hyperlipidemia, history of DVT, obesity     Surgical History  Patient denies     Social History  She has no history on file for tobacco use, alcohol use, and drug use.     Family History  Reviewed and noncontributory     Allergies  Penicillins and Sulfa (sulfonamide antibiotics)     Review of Systems  A 10 point review of systems was  "completed and negative except what is listed in HPI  Physical Exam  Constitutional:       Appearance: She is obese.   HENT:      Mouth/Throat:      Mouth: Mucous membranes are dry.      Pharynx: Oropharynx is clear.   Eyes:      Pupils: Pupils are equal, round, and reactive to light.   Cardiovascular:      Rate and Rhythm: Rhythm irregularly irregular.   Pulmonary:      Breath sounds: Rales present.   Abdominal:      General: Bowel sounds are normal.   Musculoskeletal:         General: Normal range of motion.      Cervical back: Normal range of motion.   Skin:     General: Skin is warm and dry.      Capillary Refill: Capillary refill takes less than 2 seconds.   Neurological:      General: No focal deficit present.      Mental Status: She is alert. Mental status is at baseline.   Psychiatric:         Mood and Affect: Mood normal.         Behavior: Behavior normal.            Last Recorded Vitals  Blood pressure 102/56, pulse 104, temperature 35.8 °C (96.4 °F), resp. rate 18, height 1.702 m (5' 7\"), weight (!) 168 kg (370 lb 6 oz), SpO2 97 %.     Relevant Results  XR chest 1 view     Result Date: 11/10/2023  STUDY: Chest Radiograph;  11/09/2023 10:56 PM INDICATION: Shortness of breath. COMPARISON: XR chest 03/16/2023, 03/15/2023.  ACCESSION NUMBER(S): [id number] ORDERING CLINICIAN: SACHIN WELDON TECHNIQUE:  Frontal chest was obtained at 2229 hours. FINDINGS: CARDIOMEDIASTINAL SILHOUETTE: Cardiomediastinal silhouette is markedly enlarged and stable in size and configuration.  LUNGS: Left-sided PICC line is noted terminating in the region of the proximal SVC/left subclavian junction.  Mild right basilar atelectasis.  Moderate left pleural effusion is suggested with underlying left basilar atelectasis and/or consolidation.  ABDOMEN: No remarkable upper abdominal findings.  BONES: No acute osseous changes.     Moderate left pleural effusion with left basilar atelectasis and/or consolidation.  Mild right basilar " atelectasis. Signed by Nakul Rucker MD                  Results for orders placed or performed during the hospital encounter of 11/09/23 (from the past 24 hour(s))   CBC and Auto Differential   Result Value Ref Range     WBC 14.6 (H) 4.4 - 11.3 x10*3/uL     nRBC 0.3 (H) 0.0 - 0.0 /100 WBCs     RBC 3.15 (L) 4.00 - 5.20 x10*6/uL     Hemoglobin 8.3 (L) 12.0 - 16.0 g/dL     Hematocrit 29.7 (L) 36.0 - 46.0 %     MCV 94 80 - 100 fL     MCH 26.3 26.0 - 34.0 pg     MCHC 27.9 (L) 32.0 - 36.0 g/dL     RDW 17.8 (H) 11.5 - 14.5 %     Platelets 306 150 - 450 x10*3/uL     Neutrophils % 92.8 40.0 - 80.0 %     Immature Granulocytes %, Automated 1.0 (H) 0.0 - 0.9 %     Lymphocytes % 2.6 13.0 - 44.0 %     Monocytes % 3.4 2.0 - 10.0 %     Eosinophils % 0.1 0.0 - 6.0 %     Basophils % 0.1 0.0 - 2.0 %     Neutrophils Absolute 13.55 (H) 1.60 - 5.50 x10*3/uL     Immature Granulocytes Absolute, Automated 0.15 0.00 - 0.50 x10*3/uL     Lymphocytes Absolute 0.38 (L) 0.80 - 3.00 x10*3/uL     Monocytes Absolute 0.50 0.05 - 0.80 x10*3/uL     Eosinophils Absolute 0.01 0.00 - 0.40 x10*3/uL     Basophils Absolute 0.02 0.00 - 0.10 x10*3/uL   Magnesium   Result Value Ref Range     Magnesium 1.67 1.60 - 2.40 mg/dL   Comprehensive metabolic panel   Result Value Ref Range     Glucose 183 (H) 74 - 99 mg/dL     Sodium 142 136 - 145 mmol/L     Potassium 4.0 3.5 - 5.3 mmol/L     Chloride 102 98 - 107 mmol/L     Bicarbonate 32 21 - 32 mmol/L     Anion Gap 12 10 - 20 mmol/L     Urea Nitrogen 59 (H) 6 - 23 mg/dL     Creatinine 1.46 (H) 0.50 - 1.05 mg/dL     eGFR 38 (L) >60 mL/min/1.73m*2     Calcium 8.5 (L) 8.6 - 10.3 mg/dL     Albumin 3.0 (L) 3.4 - 5.0 g/dL     Alkaline Phosphatase 89 33 - 136 U/L     Total Protein 5.6 (L) 6.4 - 8.2 g/dL     AST 8 (L) 9 - 39 U/L     Bilirubin, Total 0.3 0.0 - 1.2 mg/dL     ALT 25 7 - 45 U/L   Protime-INR   Result Value Ref Range     Protime 11.6 9.8 - 12.8 seconds     INR 1.0 0.9 - 1.1   aPTT   Result Value Ref Range      aPTT 30 27 - 38 seconds   Lactate   Result Value Ref Range     Lactate 3.6 (H) 0.4 - 2.0 mmol/L   Type And Screen   Result Value Ref Range     ABO TYPE O       Rh TYPE POS       ANTIBODY SCREEN NEG     Troponin I, High Sensitivity   Result Value Ref Range     Troponin I, High Sensitivity 17 (H) 0 - 13 ng/L   B-Type Natriuretic Peptide   Result Value Ref Range      (H) 0 - 99 pg/mL   Blood Gas Venous   Result Value Ref Range     POCT pH, Venous 7.30 (L) 7.33 - 7.43 pH     POCT pCO2, Venous 64 (H) 41 - 51 mm Hg     POCT pO2, Venous 36 35 - 45 mm Hg     POCT SO2, Venous 61 45 - 75 %     POCT Oxy Hemoglobin, Venous 59.0 45.0 - 75.0 %     POCT Base Excess, Venous 3.2 (H) -2.0 - 3.0 mmol/L     POCT HCO3 Calculated, Venous 31.5 (H) 22.0 - 26.0 mmol/L     Patient Temperature 37.0 degrees Celsius     FiO2 36 %     Test Comment ICU-S     Urinalysis with Reflex Microscopic and Culture   Result Value Ref Range     Color, Urine Yellow Straw, Yellow     Appearance, Urine Hazy (N) Clear     Specific Gravity, Urine 1.015 1.005 - 1.035     pH, Urine 5.0 5.0, 5.5, 6.0, 6.5, 7.0, 7.5, 8.0     Protein, Urine NEGATIVE NEGATIVE mg/dL     Glucose, Urine NEGATIVE NEGATIVE mg/dL     Blood, Urine MODERATE (2+) (A) NEGATIVE     Ketones, Urine NEGATIVE NEGATIVE mg/dL     Bilirubin, Urine NEGATIVE NEGATIVE     Urobilinogen, Urine <2.0 <2.0 mg/dL     Nitrite, Urine NEGATIVE NEGATIVE     Leukocyte Esterase, Urine LARGE (3+) (A) NEGATIVE   Microscopic Only, Urine   Result Value Ref Range     WBC, Urine >50 (A) 1-5, NONE /HPF     WBC Clumps, Urine FEW Reference range not established. /HPF     RBC, Urine >20 (A) NONE, 1-2, 3-5 /HPF     Squamous Epithelial Cells, Urine 1-9 (SPARSE) Reference range not established. /HPF     Transitional Epithelial Cells, Urine 1-2 (FEW) Reference range not established. /HPF     Bacteria, Urine 1+ (A) NONE SEEN /HPF     Budding Yeast, Urine PRESENT (A) NONE /HPF     Mucus, Urine 1+ Reference range not  established. /LPF     Hyaline Casts, Urine 2+ (A) NONE /LPF            Assessment/Plan   Danielle is a 73-year-old female patient presenting to emergency department for evaluation of shortness of breath and volume overload.  Patient found to have a BNP of 116, elevated troponin, elevated lactate, elevated WBCs.  Imaging showing moderate left pleural effusion with left basilar atelectasis and mild right basilar atelectasis.  Last echocardiogram on file 2/27/2023 with an EF of 60 to 65%.  Patient having new oxygen requirements moving from 2 L/min to 4 L/min.  Patient currently receiving IV Zosyn via PICC line at [facility] for recurrent UTI.  Patient found to be UTI positive, no culture sent due to being on antibiotics chronically.  Patient given IV Lasix for volume overload in ED.  Consult placed to cardiology.  Admission for further medical management.     Acute decompensated heart failure  Admit to telemetry per Dr. Carcamo  Intake and output  See imaging results above  Daily weight  Lasix IV 40 mg in ED  Consult cardiology and appreciate input  Low-sodium diet  Fluid restriction  Chronic Cruz catheter     Diabetes/hypothyroidism/hypertension/A-fib/hyperlipidemia     Diabetic diet  ISS  Hypoglycemia protocol  Hold home oral antidiabetic medications when med rec is complete  Continue home medications  Continue home Eliquis when med rec is complete     DVT PPx  Continue home Eliquis when med rec is complete  SCDs  Activity as tolerated     I spent 30 minutes in the professional and overall care of this patient.  Patient fully evaluated on November, plan as above.                      Revision History          Patient fully evaluated on November 11.  Debrox for bilateral ear cerumen impactions.  Continue to diurese patient and recheck labs in AM.         Thien Carcamo MD

## 2023-11-11 NOTE — CARE PLAN
The patient's goals for the shift include      The clinical goals for the shift include patient participates and allows personal care    Over the shift, the patient did make progress toward the following goals. Barriers to progression include refusing care. Recommendations to address these barriers include frequent turning and repositioning.

## 2023-11-12 LAB
ALBUMIN SERPL BCP-MCNC: 2.8 G/DL (ref 3.4–5)
ALP SERPL-CCNC: 67 U/L (ref 33–136)
ALT SERPL W P-5'-P-CCNC: 19 U/L (ref 7–45)
ANION GAP SERPL CALC-SCNC: 10 MMOL/L (ref 10–20)
AST SERPL W P-5'-P-CCNC: 6 U/L (ref 9–39)
BILIRUB SERPL-MCNC: 0.2 MG/DL (ref 0–1.2)
BUN SERPL-MCNC: 64 MG/DL (ref 6–23)
CALCIUM SERPL-MCNC: 7.8 MG/DL (ref 8.6–10.3)
CHLORIDE SERPL-SCNC: 99 MMOL/L (ref 98–107)
CO2 SERPL-SCNC: 34 MMOL/L (ref 21–32)
CREAT SERPL-MCNC: 1.64 MG/DL (ref 0.5–1.05)
ERYTHROCYTE [DISTWIDTH] IN BLOOD BY AUTOMATED COUNT: 17.5 % (ref 11.5–14.5)
GFR SERPL CREATININE-BSD FRML MDRD: 33 ML/MIN/1.73M*2
GLUCOSE BLD MANUAL STRIP-MCNC: 270 MG/DL (ref 74–99)
GLUCOSE BLD MANUAL STRIP-MCNC: 275 MG/DL (ref 74–99)
GLUCOSE BLD MANUAL STRIP-MCNC: 280 MG/DL (ref 74–99)
GLUCOSE BLD MANUAL STRIP-MCNC: 316 MG/DL (ref 74–99)
GLUCOSE SERPL-MCNC: 279 MG/DL (ref 74–99)
HCT VFR BLD AUTO: 25.8 % (ref 36–46)
HGB BLD-MCNC: 7.2 G/DL (ref 12–16)
IRON SATN MFR SERPL: 10 % (ref 25–45)
IRON SERPL-MCNC: 35 UG/DL (ref 35–150)
MCH RBC QN AUTO: 26.6 PG (ref 26–34)
MCHC RBC AUTO-ENTMCNC: 27.9 G/DL (ref 32–36)
MCV RBC AUTO: 95 FL (ref 80–100)
NRBC BLD-RTO: 0.4 /100 WBCS (ref 0–0)
PLATELET # BLD AUTO: 222 X10*3/UL (ref 150–450)
POTASSIUM SERPL-SCNC: 4.3 MMOL/L (ref 3.5–5.3)
PROT SERPL-MCNC: 4.9 G/DL (ref 6.4–8.2)
RBC # BLD AUTO: 2.71 X10*6/UL (ref 4–5.2)
SODIUM SERPL-SCNC: 139 MMOL/L (ref 136–145)
TIBC SERPL-MCNC: 337 UG/DL (ref 240–445)
UIBC SERPL-MCNC: 302 UG/DL (ref 110–370)
WBC # BLD AUTO: 10.5 X10*3/UL (ref 4.4–11.3)

## 2023-11-12 PROCEDURE — 83540 ASSAY OF IRON: CPT | Performed by: INTERNAL MEDICINE

## 2023-11-12 PROCEDURE — 99232 SBSQ HOSP IP/OBS MODERATE 35: CPT | Performed by: INTERNAL MEDICINE

## 2023-11-12 PROCEDURE — 2500000002 HC RX 250 W HCPCS SELF ADMINISTERED DRUGS (ALT 637 FOR MEDICARE OP, ALT 636 FOR OP/ED): Performed by: NURSE PRACTITIONER

## 2023-11-12 PROCEDURE — 2500000004 HC RX 250 GENERAL PHARMACY W/ HCPCS (ALT 636 FOR OP/ED): Performed by: NURSE PRACTITIONER

## 2023-11-12 PROCEDURE — 2500000004 HC RX 250 GENERAL PHARMACY W/ HCPCS (ALT 636 FOR OP/ED): Performed by: INTERNAL MEDICINE

## 2023-11-12 PROCEDURE — 2060000001 HC INTERMEDIATE ICU ROOM DAILY

## 2023-11-12 PROCEDURE — 80053 COMPREHEN METABOLIC PANEL: CPT | Performed by: INTERNAL MEDICINE

## 2023-11-12 PROCEDURE — 2500000001 HC RX 250 WO HCPCS SELF ADMINISTERED DRUGS (ALT 637 FOR MEDICARE OP): Performed by: INTERNAL MEDICINE

## 2023-11-12 PROCEDURE — 2500000001 HC RX 250 WO HCPCS SELF ADMINISTERED DRUGS (ALT 637 FOR MEDICARE OP): Performed by: NURSE PRACTITIONER

## 2023-11-12 PROCEDURE — 82947 ASSAY GLUCOSE BLOOD QUANT: CPT | Mod: 59

## 2023-11-12 PROCEDURE — 85027 COMPLETE CBC AUTOMATED: CPT | Performed by: INTERNAL MEDICINE

## 2023-11-12 RX ORDER — FUROSEMIDE 10 MG/ML
40 INJECTION INTRAMUSCULAR; INTRAVENOUS EVERY 12 HOURS
Status: DISCONTINUED | OUTPATIENT
Start: 2023-11-13 | End: 2023-11-22 | Stop reason: HOSPADM

## 2023-11-12 RX ADMIN — BUSPIRONE HYDROCHLORIDE 5 MG: 5 TABLET ORAL at 09:22

## 2023-11-12 RX ADMIN — PIPERACILLIN SODIUM AND TAZOBACTAM SODIUM 3.38 G: 3; .375 INJECTION, SOLUTION INTRAVENOUS at 09:24

## 2023-11-12 RX ADMIN — POTASSIUM CHLORIDE 20 MEQ: 1500 TABLET, EXTENDED RELEASE ORAL at 09:23

## 2023-11-12 RX ADMIN — Medication 1 TABLET: at 09:22

## 2023-11-12 RX ADMIN — PIPERACILLIN SODIUM AND TAZOBACTAM SODIUM 3.38 G: 3; .375 INJECTION, SOLUTION INTRAVENOUS at 02:36

## 2023-11-12 RX ADMIN — PREDNISONE 20 MG: 20 TABLET ORAL at 09:23

## 2023-11-12 RX ADMIN — TRAMADOL HYDROCHLORIDE 50 MG: 50 TABLET, COATED ORAL at 08:27

## 2023-11-12 RX ADMIN — APIXABAN 5 MG: 5 TABLET, FILM COATED ORAL at 20:54

## 2023-11-12 RX ADMIN — FUROSEMIDE 20 MG: 10 INJECTION, SOLUTION INTRAMUSCULAR; INTRAVENOUS at 02:36

## 2023-11-12 RX ADMIN — INSULIN LISPRO 3 UNITS: 100 INJECTION, SOLUTION INTRAVENOUS; SUBCUTANEOUS at 16:54

## 2023-11-12 RX ADMIN — PIPERACILLIN SODIUM AND TAZOBACTAM SODIUM 3.38 G: 3; .375 INJECTION, SOLUTION INTRAVENOUS at 15:08

## 2023-11-12 RX ADMIN — BUSPIRONE HYDROCHLORIDE 5 MG: 5 TABLET ORAL at 20:54

## 2023-11-12 RX ADMIN — METOPROLOL TARTRATE 25 MG: 25 TABLET, FILM COATED ORAL at 20:54

## 2023-11-12 RX ADMIN — Medication 10 DROP: at 20:56

## 2023-11-12 RX ADMIN — INSULIN LISPRO 3 UNITS: 100 INJECTION, SOLUTION INTRAVENOUS; SUBCUTANEOUS at 09:25

## 2023-11-12 RX ADMIN — PIPERACILLIN SODIUM AND TAZOBACTAM SODIUM 3.38 G: 3; .375 INJECTION, SOLUTION INTRAVENOUS at 20:54

## 2023-11-12 RX ADMIN — Medication 10 DROP: at 09:23

## 2023-11-12 RX ADMIN — APIXABAN 5 MG: 5 TABLET, FILM COATED ORAL at 09:22

## 2023-11-12 RX ADMIN — INSULIN LISPRO 4 UNITS: 100 INJECTION, SOLUTION INTRAVENOUS; SUBCUTANEOUS at 12:56

## 2023-11-12 RX ADMIN — PANTOPRAZOLE SODIUM 40 MG: 40 TABLET, DELAYED RELEASE ORAL at 09:23

## 2023-11-12 RX ADMIN — DILTIAZEM HYDROCHLORIDE 240 MG: 240 CAPSULE, COATED, EXTENDED RELEASE ORAL at 09:22

## 2023-11-12 RX ADMIN — LEVOTHYROXINE SODIUM 50 MCG: 100 TABLET ORAL at 05:52

## 2023-11-12 RX ADMIN — METOPROLOL TARTRATE 25 MG: 25 TABLET, FILM COATED ORAL at 09:22

## 2023-11-12 RX ADMIN — FUROSEMIDE 20 MG: 10 INJECTION, SOLUTION INTRAMUSCULAR; INTRAVENOUS at 15:08

## 2023-11-12 RX ADMIN — GABAPENTIN 100 MG: 100 CAPSULE ORAL at 09:23

## 2023-11-12 ASSESSMENT — COGNITIVE AND FUNCTIONAL STATUS - GENERAL
CLIMB 3 TO 5 STEPS WITH RAILING: TOTAL
HELP NEEDED FOR BATHING: TOTAL
WALKING IN HOSPITAL ROOM: TOTAL
PERSONAL GROOMING: TOTAL
DAILY ACTIVITIY SCORE: 8
MOVING TO AND FROM BED TO CHAIR: TOTAL
EATING MEALS: A LITTLE
MOBILITY SCORE: 6
DRESSING REGULAR UPPER BODY CLOTHING: TOTAL
TOILETING: TOTAL
MOBILITY SCORE: 6
DAILY ACTIVITIY SCORE: 8
TURNING FROM BACK TO SIDE WHILE IN FLAT BAD: TOTAL
TURNING FROM BACK TO SIDE WHILE IN FLAT BAD: TOTAL
PERSONAL GROOMING: TOTAL
DRESSING REGULAR LOWER BODY CLOTHING: TOTAL
STANDING UP FROM CHAIR USING ARMS: TOTAL
DRESSING REGULAR UPPER BODY CLOTHING: TOTAL
STANDING UP FROM CHAIR USING ARMS: TOTAL
MOVING FROM LYING ON BACK TO SITTING ON SIDE OF FLAT BED WITH BEDRAILS: TOTAL
MOVING TO AND FROM BED TO CHAIR: TOTAL
DRESSING REGULAR LOWER BODY CLOTHING: TOTAL
WALKING IN HOSPITAL ROOM: TOTAL
HELP NEEDED FOR BATHING: TOTAL
TOILETING: TOTAL
EATING MEALS: A LITTLE
MOVING FROM LYING ON BACK TO SITTING ON SIDE OF FLAT BED WITH BEDRAILS: TOTAL
CLIMB 3 TO 5 STEPS WITH RAILING: TOTAL

## 2023-11-12 ASSESSMENT — PAIN SCALES - GENERAL
PAINLEVEL_OUTOF10: 3
PAINLEVEL_OUTOF10: 0 - NO PAIN
PAINLEVEL_OUTOF10: 0 - NO PAIN
PAINLEVEL_OUTOF10: 7

## 2023-11-12 ASSESSMENT — PAIN - FUNCTIONAL ASSESSMENT
PAIN_FUNCTIONAL_ASSESSMENT: 0-10

## 2023-11-12 ASSESSMENT — PAIN DESCRIPTION - LOCATION: LOCATION: LEG

## 2023-11-12 NOTE — PROGRESS NOTES
Subjective Data:  Patient denies any chest discomfort.  Admits to mild shortness of breath.  Still admits to significant lower extremity edema.    Overnight Events:    No other events overnight.     Objective Data:  Last Recorded Vitals:  Vitals:    11/11/23 2016 11/12/23 0027 11/12/23 0312 11/12/23 0829   BP: 92/51 90/51 93/64 104/61   BP Location: Right arm Right arm Right arm    Patient Position: Sitting Sitting Sitting    Pulse: 91 93 77    Resp: 20 18 18    Temp: 35.2 °C (95.4 °F) 35.2 °C (95.4 °F) 35.1 °C (95.2 °F) 36.9 °C (98.4 °F)   TempSrc:       SpO2: 99% 95% 100% 94%   Weight:       Height:           Last Labs:  CBC - 11/12/2023:  5:31 AM  10.5 7.2 222    25.8      CMP - 11/12/2023:  5:31 AM  7.8 4.9 6 --- 0.2   3.2 2.8 19 67      PTT - 11/9/2023: 10:46 PM  1.0   11.6 30     TROPHS   Date/Time Value Ref Range Status   11/10/2023 06:08 AM 18 0 - 13 ng/L Final   11/09/2023 10:46 PM 17 0 - 13 ng/L Final   03/16/2023 05:23 AM 10 0 - 13 ng/L Final     Comment:     .  Less than 99th percentile of normal range cutoff-  Female and children under 18 years old <14 ng/L; Male <21 ng/L: Negative  Repeat testing should be performed if clinically indicated.   .  Female and children under 18 years old 14-50 ng/L; Male 21-50 ng/L:  Consistent with possible cardiac damage and possible increased clinical   risk. Serial measurements may help to assess extent of myocardial damage.   .  >50 ng/L: Consistent with cardiac damage, increased clinical risk and  myocardial infarction. Serial measurements may help assess extent of   myocardial damage.   .   NOTE: Children less than 1 year old may have higher baseline troponin   levels and results should be interpreted in conjunction with the overall   clinical context.   .  NOTE: Troponin I testing is performed using a different   testing methodology at [facility] than at other   [facility] hospitals. Direct result comparisons should only   be made within the same  method.     03/15/2023 09:14 PM 8 0 - 13 ng/L Final     Comment:     .  Less than 99th percentile of normal range cutoff-  Female and children under 18 years old <14 ng/L; Male <21 ng/L: Negative  Repeat testing should be performed if clinically indicated.   .  Female and children under 18 years old 14-50 ng/L; Male 21-50 ng/L:  Consistent with possible cardiac damage and possible increased clinical   risk. Serial measurements may help to assess extent of myocardial damage.   .  >50 ng/L: Consistent with cardiac damage, increased clinical risk and  myocardial infarction. Serial measurements may help assess extent of   myocardial damage.   .   NOTE: Children less than 1 year old may have higher baseline troponin   levels and results should be interpreted in conjunction with the overall   clinical context.   .  NOTE: Troponin I testing is performed using a different   testing methodology at [facility] than at other   [facility]. Direct result comparisons should only   be made within the same method.     02/27/2023 02:11 PM 60 0 - 13 ng/L Final     Comment:     .  Less than 99th percentile of normal range cutoff-  Female and children under 18 years old <14 ng/L; Male <21 ng/L: Negative  Repeat testing should be performed if clinically indicated.   .  Female and children under 18 years old 14-50 ng/L; Male 21-50 ng/L:  Consistent with possible cardiac damage and possible increased clinical   risk. Serial measurements may help to assess extent of myocardial damage.   .  >50 ng/L: Consistent with cardiac damage, increased clinical risk and  myocardial infarction. Serial measurements may help assess extent of   myocardial damage.   .   NOTE: Children less than 1 year old may have higher baseline troponin   levels and results should be interpreted in conjunction with the overall   clinical context.   .  NOTE: Troponin I testing is performed using a different   testing methodology at [facility] than at other   system hospitals. Direct result comparisons should only   be made within the same method.  This is a critical result.    Per Laboratory policy, critical results for this test   only qualify to the call list once per 24 hours.        BNP   Date/Time Value Ref Range Status   11/09/2023 10:46  0 - 99 pg/mL Final   03/15/2023 09:14  0 - 99 pg/mL Final     Comment:     .  <100 pg/mL - Heart failure unlikely  100-299 pg/mL - Intermediate probability of acute heart  .               failure exacerbation. Correlate with clinical  .               context and patient history.    >=300 pg/mL - Heart Failure likely. Correlate with clinical  .               context and patient history.  BNP testing is performed using different testing   methodology at [facility] than at other   [facility] hospitals. Direct result comparisons should   only be made within the same method.     02/26/2023 10:36  0 - 99 pg/mL Final     Comment:     .  <100 pg/mL - Heart failure unlikely  100-299 pg/mL - Intermediate probability of acute heart  .               failure exacerbation. Correlate with clinical  .               context and patient history.    >=300 pg/mL - Heart Failure likely. Correlate with clinical  .               context and patient history.  BNP testing is performed using different testing   methodology at [facility] than at other   [facility] hospitals. Direct result comparisons should   only be made within the same method.       HGBA1C   Date/Time Value Ref Range Status   02/14/2023 06:00 AM 6.5 % Final     Comment:          Diagnosis of Diabetes-Adults   Non-Diabetic: < or = 5.6%   Increased risk for developing diabetes: 5.7-6.4%   Diagnostic of diabetes: > or = 6.5%  .       Monitoring of Diabetes                Age (y)     Therapeutic Goal (%)   Adults:          >18           <7.0   Pediatrics:    13-18           <7.5                   7-12           <8.0                  "  0- 6            7.5-8.5   American Diabetes Association. Diabetes Care 33(S1), Jan 2010.     11/14/2022 07:00 AM 6.4 % Final     Comment:          Diagnosis of Diabetes-Adults   Non-Diabetic: < or = 5.6%   Increased risk for developing diabetes: 5.7-6.4%   Diagnostic of diabetes: > or = 6.5%  .       Monitoring of Diabetes                Age (y)     Therapeutic Goal (%)   Adults:          >18           <7.0   Pediatrics:    13-18           <7.5                   7-12           <8.0                   0- 6            7.5-8.5   American Diabetes Association. Diabetes Care 33(S1), Jan 2010.       VLDL   Date/Time Value Ref Range Status   11/14/2022 07:00 AM 19 0 - 40 mg/dL Final   05/19/2022 07:00 AM 31 0 - 40 mg/dL Final   03/26/2021 06:00 AM 34 0 - 40 mg/dL Final      Last I/O:  I/O last 3 completed shifts:  In: 300 (2 mL/kg) [P.O.:200; IV Piggyback:100]  Out: 1125 (7.5 mL/kg) [Urine:1125 (0.2 mL/kg/hr)]  Dosing Weight: 150 kg     Past Cardiology Tests (Last 3 Years):  EKG:  No results found for this or any previous visit from the past 1095 days.    Echo:  2/27/2023  CONCLUSIONS:  1. Left ventricular systolic function is normal with a 60-65% estimated ejection fraction.  2. Moderately enlarged right ventricle.  3. There is low normal right ventricular systolic function.  4. The right atrium is severely dilated.  5. Moderate to severely elevated right ventricular systolic pressure.  6. Severe tricuspid regurgitation visualized.  7. Left ventricular cavity size is decreased.    Ejection Fractions:  No results found for: \"EF\"  Cath:  No results found for this or any previous visit from the past 1095 days.    Stress Test:  No results found for this or any previous visit from the past 1095 days.    Cardiac Imaging:  No results found for this or any previous visit from the past 1095 days.      Inpatient Medications:  Scheduled medications   Medication Dose Route Frequency    apixaban  5 mg oral BID    busPIRone  5 mg oral " BID    carbamide peroxide  10 drop Each Ear BID    dilTIAZem CD  240 mg oral Daily    furosemide  20 mg intravenous q12h    gabapentin  100 mg oral Daily    insulin lispro  0-5 Units subcutaneous TID with meals    lactobacillus acidophilus  1 tablet oral Daily    [START ON 11/13/2023] levothyroxine  100 mcg oral Once per day on Mon    levothyroxine  50 mcg oral Once per day on Sun Tue Wed Thu Fri Sat    metoprolol tartrate  25 mg oral BID    pantoprazole  40 mg oral Daily    piperacillin-tazobactam  3.375 g intravenous q6h    polyethylene glycol  17 g oral Daily    potassium chloride CR  20 mEq oral Daily    predniSONE  20 mg oral Daily    [Held by provider] torsemide  20 mg oral Daily     PRN medications   Medication    acetaminophen    bisacodyl    dextrose 10 % in water (D10W)    dextrose    glucagon    LORazepam    nystatin    oxygen    traMADol    zinc oxide     Continuous Medications   Medication Dose Last Rate       Physical Exam:  Physical Exam  Vitals reviewed.   Constitutional:       Appearance: Normal appearance.   HENT:      Head: Normocephalic and atraumatic.      Mouth/Throat:      Mouth: Mucous membranes are moist.      Pharynx: Oropharynx is clear.   Cardiovascular:      Rate and Rhythm: Normal rate and regular rhythm.      Pulses: Normal pulses.      Heart sounds: Normal heart sounds.   Pulmonary:      Effort: Pulmonary effort is normal.      Breath sounds: Wheezing present.   Abdominal:      General: Bowel sounds are normal.      Palpations: Abdomen is soft.   Musculoskeletal:      Cervical back: Neck supple.      Right lower leg: Edema present.      Left lower leg: Edema present.   Skin:     General: Skin is warm and dry.   Neurological:      General: No focal deficit present.      Mental Status: She is alert.   Psychiatric:         Mood and Affect: Mood normal.         Behavior: Behavior normal.           Assessment/Plan   11/11/2023: Covering for Dr. Randolph  1.  HFpEF: Acute on chronic with  predominant right-sided features.  Continue gentle diuresis since the patient remains total body volume overloaded.  Creatinine is a little more elevated than before.  Continue to monitor.    2.  Permanent A-fib: Continue Eliquis, Cardizem and metoprolol  3.  Chronic lymphedema  4.  Anemia  5.  Hypertension  6.  Hyperlipidemia  7.  Diabetes  8.  Hypothyroidism  9.  CKD  10.  Urinary retention with chronic indwelling catheter with recurrent UTIs.    11/12/2023: Covering for Dr. Randolph  1.  HFpEF: Acute on chronic with predominant right-sided features.  Continue gentle diuresis since the patient remains total body volume overloaded.  Continue to monitor renal function.    2.  Permanent A-fib: Continue Eliquis, Cardizem and metoprolol  3.  Chronic lymphedema  4.  Anemia  5.  Hypertension  6.  Hyperlipidemia  7.  Diabetes  8.  Hypothyroidism  9.  CKD  10.  Urinary retention with chronic indwelling catheter with recurrent UTIs.    PICC - Adult Left Basilic vein (Active)   Line Necessity Intravenous medication therapy 11/11/23 1000   Number of days:        Urethral Catheter (Active)   Output (mL) 300 mL 11/11/23 0500   Number of days: 1       Code Status:  Full Code    Johann Verdugo MD

## 2023-11-12 NOTE — PROGRESS NOTES
Danielle Parisi is a 73 y.o. female on day 2 of admission presenting with Acute decompensated heart failure (CMS/HCC).      Subjective   Patient fully evaluated on November 11 and clinically improving.       Objective     Last Recorded Vitals  BP 97/55   Pulse 77   Temp 35.8 °C (96.4 °F)   Resp 18   Wt (!) 168 kg (370 lb 6 oz)   SpO2 94%   Intake/Output last 3 Shifts:    Intake/Output Summary (Last 24 hours) at 11/12/2023 1455  Last data filed at 11/12/2023 0244  Gross per 24 hour   Intake --   Output 450 ml   Net -450 ml         Admission Weight  Weight: 150 kg (330 lb) (11/09/23 2215)    Daily Weight  11/10/23 : (!) 168 kg (370 lb 6 oz)    Image Results  XR chest 1 view  Narrative: STUDY:  Chest Radiograph;  11/09/2023 10:56 PM   INDICATION:  Shortness of breath.  COMPARISON:  XR chest 03/16/2023, 03/15/2023.    ACCESSION NUMBER(S):  [id number]  ORDERING CLINICIAN:  SACHIN WELDON  TECHNIQUE:  Frontal chest was obtained at 2229 hours.  FINDINGS:  CARDIOMEDIASTINAL SILHOUETTE:  Cardiomediastinal silhouette is markedly enlarged and stable in size  and configuration.     LUNGS:  Left-sided PICC line is noted terminating in the region of the  proximal SVC/left subclavian junction.  Mild right basilar  atelectasis.  Moderate left pleural effusion is suggested with  underlying left basilar atelectasis and/or consolidation.     ABDOMEN:  No remarkable upper abdominal findings.     BONES:  No acute osseous changes.  Impression: Moderate left pleural effusion with left basilar atelectasis and/or  consolidation.  Mild right basilar atelectasis.  Signed by Nakul Rucker MD      Physical Exam    Relevant Results               Assessment/Plan   This patient currently has cardiac telemetry ordered; if you would like to modify or discontinue the telemetry order, click here to go to the orders activity to modify/discontinue the order.              Principal Problem:    Acute decompensated heart failure (CMS/HCC)           Thien Carcamo MD  Physician  Internal Medicine     H&P      Addendum     Date of Service: 11/10/2023  5:38 AM     Addendum       Expand All Collapse All    History Of Present Illness  Danielle Parisi is a 73 y.o. female presenting to emergency department from skilled nursing facility for evaluation of increasing shortness of breath and volume overload.  Patient has a PICC line and is on IV Zosyn for recurrent UTI.  Over the past few days she has had a worsening shortness of breath.  She does admit to increasing lower extremity edema.  Patient is basically bedbound, denies fever but does admit to a cough.  Patient denies recent illness, nausea, vomiting, diarrhea.     In ED, , glucose 183, BUN 59, creatinine 1.46, GFR 38, AST 8, lactate 3.6 with a repeat pending.  WBCs 14.6, hemoglobin 8.3, hematocrit 29.7, troponin 17 with a repeat pending.  Chest x-ray completed showing moderate left pleural effusion with left basilar atelectasis, mild right basilar atelectasis per radiology review.  Last echocardiogram on file 2/27/2023 showing an ejection fraction of 60 to 65% with a dilated right atrium and severe tricuspid regurgitation.  EKG showing A-fib at a rate of 90 without ST elevation or depression per ER physician review.  Patient given Lasix IV in ED.  Consult placed to cardiology.  Patient admitted to telemetry under the care of Dr. Carcamo who will continue to follow.  I was asked to do H&P and place initial admission orders.     Past Medical History  Hypertension, A-fib on Eliquis, CONOR, hypothyroidism, lymphedema, diabetes, hyperlipidemia, history of DVT, obesity     Surgical History  Patient denies     Social History  She has no history on file for tobacco use, alcohol use, and drug use.     Family History  Reviewed and noncontributory     Allergies  Penicillins and Sulfa (sulfonamide antibiotics)     Review of Systems  A 10 point review of systems was completed and negative except what is listed in  "HPI  Physical Exam  Constitutional:       Appearance: She is obese.   HENT:      Mouth/Throat:      Mouth: Mucous membranes are dry.      Pharynx: Oropharynx is clear.   Eyes:      Pupils: Pupils are equal, round, and reactive to light.   Cardiovascular:      Rate and Rhythm: Rhythm irregularly irregular.   Pulmonary:      Breath sounds: Rales present.   Abdominal:      General: Bowel sounds are normal.   Musculoskeletal:         General: Normal range of motion.      Cervical back: Normal range of motion.   Skin:     General: Skin is warm and dry.      Capillary Refill: Capillary refill takes less than 2 seconds.   Neurological:      General: No focal deficit present.      Mental Status: She is alert. Mental status is at baseline.   Psychiatric:         Mood and Affect: Mood normal.         Behavior: Behavior normal.            Last Recorded Vitals  Blood pressure 102/56, pulse 104, temperature 35.8 °C (96.4 °F), resp. rate 18, height 1.702 m (5' 7\"), weight (!) 168 kg (370 lb 6 oz), SpO2 97 %.     Relevant Results  XR chest 1 view     Result Date: 11/10/2023  STUDY: Chest Radiograph;  11/09/2023 10:56 PM INDICATION: Shortness of breath. COMPARISON: XR chest 03/16/2023, 03/15/2023.  ACCESSION NUMBER(S): [id number] ORDERING CLINICIAN: SACHIN WELDON TECHNIQUE:  Frontal chest was obtained at 2229 hours. FINDINGS: CARDIOMEDIASTINAL SILHOUETTE: Cardiomediastinal silhouette is markedly enlarged and stable in size and configuration.  LUNGS: Left-sided PICC line is noted terminating in the region of the proximal SVC/left subclavian junction.  Mild right basilar atelectasis.  Moderate left pleural effusion is suggested with underlying left basilar atelectasis and/or consolidation.  ABDOMEN: No remarkable upper abdominal findings.  BONES: No acute osseous changes.     Moderate left pleural effusion with left basilar atelectasis and/or consolidation.  Mild right basilar atelectasis. Signed by Nakul Rucker MD              "     Results for orders placed or performed during the hospital encounter of 11/09/23 (from the past 24 hour(s))   CBC and Auto Differential   Result Value Ref Range     WBC 14.6 (H) 4.4 - 11.3 x10*3/uL     nRBC 0.3 (H) 0.0 - 0.0 /100 WBCs     RBC 3.15 (L) 4.00 - 5.20 x10*6/uL     Hemoglobin 8.3 (L) 12.0 - 16.0 g/dL     Hematocrit 29.7 (L) 36.0 - 46.0 %     MCV 94 80 - 100 fL     MCH 26.3 26.0 - 34.0 pg     MCHC 27.9 (L) 32.0 - 36.0 g/dL     RDW 17.8 (H) 11.5 - 14.5 %     Platelets 306 150 - 450 x10*3/uL     Neutrophils % 92.8 40.0 - 80.0 %     Immature Granulocytes %, Automated 1.0 (H) 0.0 - 0.9 %     Lymphocytes % 2.6 13.0 - 44.0 %     Monocytes % 3.4 2.0 - 10.0 %     Eosinophils % 0.1 0.0 - 6.0 %     Basophils % 0.1 0.0 - 2.0 %     Neutrophils Absolute 13.55 (H) 1.60 - 5.50 x10*3/uL     Immature Granulocytes Absolute, Automated 0.15 0.00 - 0.50 x10*3/uL     Lymphocytes Absolute 0.38 (L) 0.80 - 3.00 x10*3/uL     Monocytes Absolute 0.50 0.05 - 0.80 x10*3/uL     Eosinophils Absolute 0.01 0.00 - 0.40 x10*3/uL     Basophils Absolute 0.02 0.00 - 0.10 x10*3/uL   Magnesium   Result Value Ref Range     Magnesium 1.67 1.60 - 2.40 mg/dL   Comprehensive metabolic panel   Result Value Ref Range     Glucose 183 (H) 74 - 99 mg/dL     Sodium 142 136 - 145 mmol/L     Potassium 4.0 3.5 - 5.3 mmol/L     Chloride 102 98 - 107 mmol/L     Bicarbonate 32 21 - 32 mmol/L     Anion Gap 12 10 - 20 mmol/L     Urea Nitrogen 59 (H) 6 - 23 mg/dL     Creatinine 1.46 (H) 0.50 - 1.05 mg/dL     eGFR 38 (L) >60 mL/min/1.73m*2     Calcium 8.5 (L) 8.6 - 10.3 mg/dL     Albumin 3.0 (L) 3.4 - 5.0 g/dL     Alkaline Phosphatase 89 33 - 136 U/L     Total Protein 5.6 (L) 6.4 - 8.2 g/dL     AST 8 (L) 9 - 39 U/L     Bilirubin, Total 0.3 0.0 - 1.2 mg/dL     ALT 25 7 - 45 U/L   Protime-INR   Result Value Ref Range     Protime 11.6 9.8 - 12.8 seconds     INR 1.0 0.9 - 1.1   aPTT   Result Value Ref Range     aPTT 30 27 - 38 seconds   Lactate   Result Value Ref  Range     Lactate 3.6 (H) 0.4 - 2.0 mmol/L   Type And Screen   Result Value Ref Range     ABO TYPE O       Rh TYPE POS       ANTIBODY SCREEN NEG     Troponin I, High Sensitivity   Result Value Ref Range     Troponin I, High Sensitivity 17 (H) 0 - 13 ng/L   B-Type Natriuretic Peptide   Result Value Ref Range      (H) 0 - 99 pg/mL   Blood Gas Venous   Result Value Ref Range     POCT pH, Venous 7.30 (L) 7.33 - 7.43 pH     POCT pCO2, Venous 64 (H) 41 - 51 mm Hg     POCT pO2, Venous 36 35 - 45 mm Hg     POCT SO2, Venous 61 45 - 75 %     POCT Oxy Hemoglobin, Venous 59.0 45.0 - 75.0 %     POCT Base Excess, Venous 3.2 (H) -2.0 - 3.0 mmol/L     POCT HCO3 Calculated, Venous 31.5 (H) 22.0 - 26.0 mmol/L     Patient Temperature 37.0 degrees Celsius     FiO2 36 %     Test Comment ICU-S     Urinalysis with Reflex Microscopic and Culture   Result Value Ref Range     Color, Urine Yellow Straw, Yellow     Appearance, Urine Hazy (N) Clear     Specific Gravity, Urine 1.015 1.005 - 1.035     pH, Urine 5.0 5.0, 5.5, 6.0, 6.5, 7.0, 7.5, 8.0     Protein, Urine NEGATIVE NEGATIVE mg/dL     Glucose, Urine NEGATIVE NEGATIVE mg/dL     Blood, Urine MODERATE (2+) (A) NEGATIVE     Ketones, Urine NEGATIVE NEGATIVE mg/dL     Bilirubin, Urine NEGATIVE NEGATIVE     Urobilinogen, Urine <2.0 <2.0 mg/dL     Nitrite, Urine NEGATIVE NEGATIVE     Leukocyte Esterase, Urine LARGE (3+) (A) NEGATIVE   Microscopic Only, Urine   Result Value Ref Range     WBC, Urine >50 (A) 1-5, NONE /HPF     WBC Clumps, Urine FEW Reference range not established. /HPF     RBC, Urine >20 (A) NONE, 1-2, 3-5 /HPF     Squamous Epithelial Cells, Urine 1-9 (SPARSE) Reference range not established. /HPF     Transitional Epithelial Cells, Urine 1-2 (FEW) Reference range not established. /HPF     Bacteria, Urine 1+ (A) NONE SEEN /HPF     Budding Yeast, Urine PRESENT (A) NONE /HPF     Mucus, Urine 1+ Reference range not established. /LPF     Hyaline Casts, Urine 2+ (A) NONE /LPF             Assessment/Plan   Danielle is a 73-year-old female patient presenting to emergency department for evaluation of shortness of breath and volume overload.  Patient found to have a BNP of 116, elevated troponin, elevated lactate, elevated WBCs.  Imaging showing moderate left pleural effusion with left basilar atelectasis and mild right basilar atelectasis.  Last echocardiogram on file 2/27/2023 with an EF of 60 to 65%.  Patient having new oxygen requirements moving from 2 L/min to 4 L/min.  Patient currently receiving IV Zosyn via PICC line at [facility] for recurrent UTI.  Patient found to be UTI positive, no culture sent due to being on antibiotics chronically.  Patient given IV Lasix for volume overload in ED.  Consult placed to cardiology.  Admission for further medical management.     Acute decompensated heart failure  Admit to telemetry per Dr. Carcamo  Intake and output  See imaging results above  Daily weight  Lasix IV 40 mg in ED  Consult cardiology and appreciate input  Low-sodium diet  Fluid restriction  Chronic Cruz catheter     Diabetes/hypothyroidism/hypertension/A-fib/hyperlipidemia     Diabetic diet  ISS  Hypoglycemia protocol  Hold home oral antidiabetic medications when med rec is complete  Continue home medications  Continue home Eliquis when med rec is complete     DVT PPx  Continue home Eliquis when med rec is complete  SCDs  Activity as tolerated     I spent 30 minutes in the professional and overall care of this patient.  Patient fully evaluated on November, plan as above.                      Revision History          Patient fully evaluated on November 11.  Debrox for bilateral ear cerumen impactions.  Continue to diurese patient and recheck labs in AM.     Patient fully evaluated on November 12.  Continue aggressive diuresis.  BUN and creatinine are stable.  Recheck labs in AM.    Thien Carcamo MD

## 2023-11-13 LAB
ALBUMIN SERPL BCP-MCNC: 3 G/DL (ref 3.4–5)
ALP SERPL-CCNC: 70 U/L (ref 33–136)
ALT SERPL W P-5'-P-CCNC: 19 U/L (ref 7–45)
ANION GAP SERPL CALC-SCNC: 12 MMOL/L (ref 10–20)
AST SERPL W P-5'-P-CCNC: 5 U/L (ref 9–39)
BILIRUB SERPL-MCNC: 0.2 MG/DL (ref 0–1.2)
BUN SERPL-MCNC: 68 MG/DL (ref 6–23)
CALCIUM SERPL-MCNC: 8.3 MG/DL (ref 8.6–10.3)
CHLORIDE SERPL-SCNC: 100 MMOL/L (ref 98–107)
CO2 SERPL-SCNC: 33 MMOL/L (ref 21–32)
CREAT SERPL-MCNC: 2.31 MG/DL (ref 0.5–1.05)
ERYTHROCYTE [DISTWIDTH] IN BLOOD BY AUTOMATED COUNT: 17.3 % (ref 11.5–14.5)
GFR SERPL CREATININE-BSD FRML MDRD: 22 ML/MIN/1.73M*2
GLUCOSE BLD MANUAL STRIP-MCNC: 267 MG/DL (ref 74–99)
GLUCOSE BLD MANUAL STRIP-MCNC: 330 MG/DL (ref 74–99)
GLUCOSE BLD MANUAL STRIP-MCNC: 337 MG/DL (ref 74–99)
GLUCOSE BLD MANUAL STRIP-MCNC: 353 MG/DL (ref 74–99)
GLUCOSE SERPL-MCNC: 283 MG/DL (ref 74–99)
HCT VFR BLD AUTO: 27.8 % (ref 36–46)
HGB BLD-MCNC: 7.7 G/DL (ref 12–16)
MCH RBC QN AUTO: 26.3 PG (ref 26–34)
MCHC RBC AUTO-ENTMCNC: 27.7 G/DL (ref 32–36)
MCV RBC AUTO: 95 FL (ref 80–100)
NRBC BLD-RTO: 0.6 /100 WBCS (ref 0–0)
PLATELET # BLD AUTO: 226 X10*3/UL (ref 150–450)
POTASSIUM SERPL-SCNC: 5.3 MMOL/L (ref 3.5–5.3)
PROT SERPL-MCNC: 5.6 G/DL (ref 6.4–8.2)
RBC # BLD AUTO: 2.93 X10*6/UL (ref 4–5.2)
SODIUM SERPL-SCNC: 140 MMOL/L (ref 136–145)
WBC # BLD AUTO: 14.3 X10*3/UL (ref 4.4–11.3)

## 2023-11-13 PROCEDURE — 2500000004 HC RX 250 GENERAL PHARMACY W/ HCPCS (ALT 636 FOR OP/ED): Performed by: NURSE PRACTITIONER

## 2023-11-13 PROCEDURE — 2060000001 HC INTERMEDIATE ICU ROOM DAILY

## 2023-11-13 PROCEDURE — 82947 ASSAY GLUCOSE BLOOD QUANT: CPT | Mod: 59

## 2023-11-13 PROCEDURE — 85027 COMPLETE CBC AUTOMATED: CPT | Performed by: INTERNAL MEDICINE

## 2023-11-13 PROCEDURE — 80053 COMPREHEN METABOLIC PANEL: CPT | Performed by: INTERNAL MEDICINE

## 2023-11-13 PROCEDURE — 2500000001 HC RX 250 WO HCPCS SELF ADMINISTERED DRUGS (ALT 637 FOR MEDICARE OP): Performed by: NURSE PRACTITIONER

## 2023-11-13 PROCEDURE — 2500000004 HC RX 250 GENERAL PHARMACY W/ HCPCS (ALT 636 FOR OP/ED): Performed by: INTERNAL MEDICINE

## 2023-11-13 PROCEDURE — 2500000002 HC RX 250 W HCPCS SELF ADMINISTERED DRUGS (ALT 637 FOR MEDICARE OP, ALT 636 FOR OP/ED): Performed by: NURSE PRACTITIONER

## 2023-11-13 PROCEDURE — 2500000001 HC RX 250 WO HCPCS SELF ADMINISTERED DRUGS (ALT 637 FOR MEDICARE OP): Performed by: INTERNAL MEDICINE

## 2023-11-13 RX ADMIN — APIXABAN 5 MG: 5 TABLET, FILM COATED ORAL at 20:10

## 2023-11-13 RX ADMIN — DILTIAZEM HYDROCHLORIDE 240 MG: 240 CAPSULE, COATED, EXTENDED RELEASE ORAL at 09:54

## 2023-11-13 RX ADMIN — INSULIN LISPRO 5 UNITS: 100 INJECTION, SOLUTION INTRAVENOUS; SUBCUTANEOUS at 12:31

## 2023-11-13 RX ADMIN — PANTOPRAZOLE SODIUM 40 MG: 40 TABLET, DELAYED RELEASE ORAL at 09:54

## 2023-11-13 RX ADMIN — Medication 10 DROP: at 20:53

## 2023-11-13 RX ADMIN — INSULIN LISPRO 4 UNITS: 100 INJECTION, SOLUTION INTRAVENOUS; SUBCUTANEOUS at 16:38

## 2023-11-13 RX ADMIN — BUSPIRONE HYDROCHLORIDE 5 MG: 5 TABLET ORAL at 09:53

## 2023-11-13 RX ADMIN — PIPERACILLIN SODIUM AND TAZOBACTAM SODIUM 3.38 G: 3; .375 INJECTION, SOLUTION INTRAVENOUS at 13:56

## 2023-11-13 RX ADMIN — BUSPIRONE HYDROCHLORIDE 5 MG: 5 TABLET ORAL at 20:10

## 2023-11-13 RX ADMIN — FUROSEMIDE 40 MG: 10 INJECTION, SOLUTION INTRAMUSCULAR; INTRAVENOUS at 02:23

## 2023-11-13 RX ADMIN — LEVOTHYROXINE SODIUM 100 MCG: 100 TABLET ORAL at 05:53

## 2023-11-13 RX ADMIN — GABAPENTIN 100 MG: 100 CAPSULE ORAL at 09:53

## 2023-11-13 RX ADMIN — PIPERACILLIN SODIUM AND TAZOBACTAM SODIUM 3.38 G: 3; .375 INJECTION, SOLUTION INTRAVENOUS at 09:52

## 2023-11-13 RX ADMIN — APIXABAN 5 MG: 5 TABLET, FILM COATED ORAL at 09:54

## 2023-11-13 RX ADMIN — PIPERACILLIN SODIUM AND TAZOBACTAM SODIUM 3.38 G: 3; .375 INJECTION, SOLUTION INTRAVENOUS at 02:23

## 2023-11-13 RX ADMIN — PREDNISONE 20 MG: 20 TABLET ORAL at 09:54

## 2023-11-13 RX ADMIN — INSULIN LISPRO 3 UNITS: 100 INJECTION, SOLUTION INTRAVENOUS; SUBCUTANEOUS at 09:59

## 2023-11-13 RX ADMIN — Medication 1 TABLET: at 09:54

## 2023-11-13 RX ADMIN — PIPERACILLIN SODIUM AND TAZOBACTAM SODIUM 3.38 G: 3; .375 INJECTION, SOLUTION INTRAVENOUS at 20:10

## 2023-11-13 ASSESSMENT — COGNITIVE AND FUNCTIONAL STATUS - GENERAL
PERSONAL GROOMING: TOTAL
CLIMB 3 TO 5 STEPS WITH RAILING: TOTAL
DRESSING REGULAR LOWER BODY CLOTHING: TOTAL
EATING MEALS: A LITTLE
STANDING UP FROM CHAIR USING ARMS: TOTAL
DAILY ACTIVITIY SCORE: 8
WALKING IN HOSPITAL ROOM: TOTAL
HELP NEEDED FOR BATHING: TOTAL
MOBILITY SCORE: 6
TURNING FROM BACK TO SIDE WHILE IN FLAT BAD: TOTAL
PERSONAL GROOMING: TOTAL
TURNING FROM BACK TO SIDE WHILE IN FLAT BAD: TOTAL
MOVING FROM LYING ON BACK TO SITTING ON SIDE OF FLAT BED WITH BEDRAILS: TOTAL
MOVING TO AND FROM BED TO CHAIR: TOTAL
MOBILITY SCORE: 6
MOVING TO AND FROM BED TO CHAIR: TOTAL
MOVING FROM LYING ON BACK TO SITTING ON SIDE OF FLAT BED WITH BEDRAILS: TOTAL
HELP NEEDED FOR BATHING: TOTAL
EATING MEALS: A LITTLE
WALKING IN HOSPITAL ROOM: TOTAL
STANDING UP FROM CHAIR USING ARMS: TOTAL
DRESSING REGULAR UPPER BODY CLOTHING: TOTAL
TOILETING: TOTAL
DRESSING REGULAR UPPER BODY CLOTHING: TOTAL
DAILY ACTIVITIY SCORE: 8
DRESSING REGULAR LOWER BODY CLOTHING: TOTAL
TOILETING: TOTAL
CLIMB 3 TO 5 STEPS WITH RAILING: TOTAL

## 2023-11-13 ASSESSMENT — PAIN SCALES - GENERAL
PAINLEVEL_OUTOF10: 0 - NO PAIN
PAINLEVEL_OUTOF10: 0 - NO PAIN

## 2023-11-13 ASSESSMENT — PAIN - FUNCTIONAL ASSESSMENT: PAIN_FUNCTIONAL_ASSESSMENT: 0-10

## 2023-11-13 NOTE — DISCHARGE INSTRUCTIONS
Heart Failure Discharge Instructions - SNF/ECF    1. Weigh patient daily and record.  2. If patient gain more than 2 or 3 pounds overnight or 5 pounds in 5 days, call the cardiologist.  3. Follow a low sodium diet. No more than 2000 mg in one day, or more than 650 mg per meal.  4. Limit total fluids to no more than 8 cups (or 2 liters) per day - this includes all fluids (water, coffee, juice, milk, tea, etc.)  5. Monitor blood pressure daily and record.  6. Be sure to see cardiologist in one week after discharge. Call to schedule follow-up appointments.  7. Keep follow-up appointments. Send the weight and vital signs record with patient to the appointments so the doctors can see the weight trend and blood pressure readings for tighter heart failure management.  8. Activity as tolerated, advance based on tolerance.  9. If you notice subtle change of symptoms (slight increase in swelling, slight shortness of breath, a new intolerance to lying flat, a new cough), be sure to call the cardiologist.

## 2023-11-13 NOTE — PROGRESS NOTES
Danielle Parisi is a 73 y.o. female on day 3 of admission presenting with Acute decompensated heart failure (CMS/HCC).      Subjective   Patient fully evaluated on November 11 and clinically improving.       Objective     Last Recorded Vitals  BP 89/55   Pulse 80   Temp 34.9 °C (94.8 °F)   Resp 16   Wt (!) 168 kg (370 lb 6 oz)   SpO2 92%   Intake/Output last 3 Shifts:    Intake/Output Summary (Last 24 hours) at 11/13/2023 1502  Last data filed at 11/13/2023 0300  Gross per 24 hour   Intake --   Output 300 ml   Net -300 ml         Admission Weight  Weight: 150 kg (330 lb) (11/09/23 2215)    Daily Weight  11/10/23 : (!) 168 kg (370 lb 6 oz)    Image Results  XR chest 1 view  Narrative: STUDY:  Chest Radiograph;  11/09/2023 10:56 PM   INDICATION:  Shortness of breath.  COMPARISON:  XR chest 03/16/2023, 03/15/2023.    ACCESSION NUMBER(S):  [id number]  ORDERING CLINICIAN:  SACHIN WELDON  TECHNIQUE:  Frontal chest was obtained at 2229 hours.  FINDINGS:  CARDIOMEDIASTINAL SILHOUETTE:  Cardiomediastinal silhouette is markedly enlarged and stable in size  and configuration.     LUNGS:  Left-sided PICC line is noted terminating in the region of the  proximal SVC/left subclavian junction.  Mild right basilar  atelectasis.  Moderate left pleural effusion is suggested with  underlying left basilar atelectasis and/or consolidation.     ABDOMEN:  No remarkable upper abdominal findings.     BONES:  No acute osseous changes.  Impression: Moderate left pleural effusion with left basilar atelectasis and/or  consolidation.  Mild right basilar atelectasis.  Signed by Nakul Rucker MD      Physical Exam    Relevant Results               Assessment/Plan   This patient currently has cardiac telemetry ordered; if you would like to modify or discontinue the telemetry order, click here to go to the orders activity to modify/discontinue the order.              Principal Problem:    Acute decompensated heart failure (CMS/HCC)           Thien Carcamo MD  Physician  Internal Medicine     H&P      Addendum     Date of Service: 11/10/2023  5:38 AM     Addendum       Expand All Collapse All    History Of Present Illness  Danielle Parisi is a 73 y.o. female presenting to emergency department from skilled nursing facility for evaluation of increasing shortness of breath and volume overload.  Patient has a PICC line and is on IV Zosyn for recurrent UTI.  Over the past few days she has had a worsening shortness of breath.  She does admit to increasing lower extremity edema.  Patient is basically bedbound, denies fever but does admit to a cough.  Patient denies recent illness, nausea, vomiting, diarrhea.     In ED, , glucose 183, BUN 59, creatinine 1.46, GFR 38, AST 8, lactate 3.6 with a repeat pending.  WBCs 14.6, hemoglobin 8.3, hematocrit 29.7, troponin 17 with a repeat pending.  Chest x-ray completed showing moderate left pleural effusion with left basilar atelectasis, mild right basilar atelectasis per radiology review.  Last echocardiogram on file 2/27/2023 showing an ejection fraction of 60 to 65% with a dilated right atrium and severe tricuspid regurgitation.  EKG showing A-fib at a rate of 90 without ST elevation or depression per ER physician review.  Patient given Lasix IV in ED.  Consult placed to cardiology.  Patient admitted to telemetry under the care of Dr. Carcamo who will continue to follow.  I was asked to do H&P and place initial admission orders.     Past Medical History  Hypertension, A-fib on Eliquis, CONOR, hypothyroidism, lymphedema, diabetes, hyperlipidemia, history of DVT, obesity     Surgical History  Patient denies     Social History  She has no history on file for tobacco use, alcohol use, and drug use.     Family History  Reviewed and noncontributory     Allergies  Penicillins and Sulfa (sulfonamide antibiotics)     Review of Systems  A 10 point review of systems was completed and negative except what is listed in  "HPI  Physical Exam  Constitutional:       Appearance: She is obese.   HENT:      Mouth/Throat:      Mouth: Mucous membranes are dry.      Pharynx: Oropharynx is clear.   Eyes:      Pupils: Pupils are equal, round, and reactive to light.   Cardiovascular:      Rate and Rhythm: Rhythm irregularly irregular.   Pulmonary:      Breath sounds: Rales present.   Abdominal:      General: Bowel sounds are normal.   Musculoskeletal:         General: Normal range of motion.      Cervical back: Normal range of motion.   Skin:     General: Skin is warm and dry.      Capillary Refill: Capillary refill takes less than 2 seconds.   Neurological:      General: No focal deficit present.      Mental Status: She is alert. Mental status is at baseline.   Psychiatric:         Mood and Affect: Mood normal.         Behavior: Behavior normal.            Last Recorded Vitals  Blood pressure 102/56, pulse 104, temperature 35.8 °C (96.4 °F), resp. rate 18, height 1.702 m (5' 7\"), weight (!) 168 kg (370 lb 6 oz), SpO2 97 %.     Relevant Results  XR chest 1 view     Result Date: 11/10/2023  STUDY: Chest Radiograph;  11/09/2023 10:56 PM INDICATION: Shortness of breath. COMPARISON: XR chest 03/16/2023, 03/15/2023.  ACCESSION NUMBER(S): [id number] ORDERING CLINICIAN: SACHIN WELDON TECHNIQUE:  Frontal chest was obtained at 2229 hours. FINDINGS: CARDIOMEDIASTINAL SILHOUETTE: Cardiomediastinal silhouette is markedly enlarged and stable in size and configuration.  LUNGS: Left-sided PICC line is noted terminating in the region of the proximal SVC/left subclavian junction.  Mild right basilar atelectasis.  Moderate left pleural effusion is suggested with underlying left basilar atelectasis and/or consolidation.  ABDOMEN: No remarkable upper abdominal findings.  BONES: No acute osseous changes.     Moderate left pleural effusion with left basilar atelectasis and/or consolidation.  Mild right basilar atelectasis. Signed by Nakul Rucker MD              "     Results for orders placed or performed during the hospital encounter of 11/09/23 (from the past 24 hour(s))   CBC and Auto Differential   Result Value Ref Range     WBC 14.6 (H) 4.4 - 11.3 x10*3/uL     nRBC 0.3 (H) 0.0 - 0.0 /100 WBCs     RBC 3.15 (L) 4.00 - 5.20 x10*6/uL     Hemoglobin 8.3 (L) 12.0 - 16.0 g/dL     Hematocrit 29.7 (L) 36.0 - 46.0 %     MCV 94 80 - 100 fL     MCH 26.3 26.0 - 34.0 pg     MCHC 27.9 (L) 32.0 - 36.0 g/dL     RDW 17.8 (H) 11.5 - 14.5 %     Platelets 306 150 - 450 x10*3/uL     Neutrophils % 92.8 40.0 - 80.0 %     Immature Granulocytes %, Automated 1.0 (H) 0.0 - 0.9 %     Lymphocytes % 2.6 13.0 - 44.0 %     Monocytes % 3.4 2.0 - 10.0 %     Eosinophils % 0.1 0.0 - 6.0 %     Basophils % 0.1 0.0 - 2.0 %     Neutrophils Absolute 13.55 (H) 1.60 - 5.50 x10*3/uL     Immature Granulocytes Absolute, Automated 0.15 0.00 - 0.50 x10*3/uL     Lymphocytes Absolute 0.38 (L) 0.80 - 3.00 x10*3/uL     Monocytes Absolute 0.50 0.05 - 0.80 x10*3/uL     Eosinophils Absolute 0.01 0.00 - 0.40 x10*3/uL     Basophils Absolute 0.02 0.00 - 0.10 x10*3/uL   Magnesium   Result Value Ref Range     Magnesium 1.67 1.60 - 2.40 mg/dL   Comprehensive metabolic panel   Result Value Ref Range     Glucose 183 (H) 74 - 99 mg/dL     Sodium 142 136 - 145 mmol/L     Potassium 4.0 3.5 - 5.3 mmol/L     Chloride 102 98 - 107 mmol/L     Bicarbonate 32 21 - 32 mmol/L     Anion Gap 12 10 - 20 mmol/L     Urea Nitrogen 59 (H) 6 - 23 mg/dL     Creatinine 1.46 (H) 0.50 - 1.05 mg/dL     eGFR 38 (L) >60 mL/min/1.73m*2     Calcium 8.5 (L) 8.6 - 10.3 mg/dL     Albumin 3.0 (L) 3.4 - 5.0 g/dL     Alkaline Phosphatase 89 33 - 136 U/L     Total Protein 5.6 (L) 6.4 - 8.2 g/dL     AST 8 (L) 9 - 39 U/L     Bilirubin, Total 0.3 0.0 - 1.2 mg/dL     ALT 25 7 - 45 U/L   Protime-INR   Result Value Ref Range     Protime 11.6 9.8 - 12.8 seconds     INR 1.0 0.9 - 1.1   aPTT   Result Value Ref Range     aPTT 30 27 - 38 seconds   Lactate   Result Value Ref  Range     Lactate 3.6 (H) 0.4 - 2.0 mmol/L   Type And Screen   Result Value Ref Range     ABO TYPE O       Rh TYPE POS       ANTIBODY SCREEN NEG     Troponin I, High Sensitivity   Result Value Ref Range     Troponin I, High Sensitivity 17 (H) 0 - 13 ng/L   B-Type Natriuretic Peptide   Result Value Ref Range      (H) 0 - 99 pg/mL   Blood Gas Venous   Result Value Ref Range     POCT pH, Venous 7.30 (L) 7.33 - 7.43 pH     POCT pCO2, Venous 64 (H) 41 - 51 mm Hg     POCT pO2, Venous 36 35 - 45 mm Hg     POCT SO2, Venous 61 45 - 75 %     POCT Oxy Hemoglobin, Venous 59.0 45.0 - 75.0 %     POCT Base Excess, Venous 3.2 (H) -2.0 - 3.0 mmol/L     POCT HCO3 Calculated, Venous 31.5 (H) 22.0 - 26.0 mmol/L     Patient Temperature 37.0 degrees Celsius     FiO2 36 %     Test Comment ICU-S     Urinalysis with Reflex Microscopic and Culture   Result Value Ref Range     Color, Urine Yellow Straw, Yellow     Appearance, Urine Hazy (N) Clear     Specific Gravity, Urine 1.015 1.005 - 1.035     pH, Urine 5.0 5.0, 5.5, 6.0, 6.5, 7.0, 7.5, 8.0     Protein, Urine NEGATIVE NEGATIVE mg/dL     Glucose, Urine NEGATIVE NEGATIVE mg/dL     Blood, Urine MODERATE (2+) (A) NEGATIVE     Ketones, Urine NEGATIVE NEGATIVE mg/dL     Bilirubin, Urine NEGATIVE NEGATIVE     Urobilinogen, Urine <2.0 <2.0 mg/dL     Nitrite, Urine NEGATIVE NEGATIVE     Leukocyte Esterase, Urine LARGE (3+) (A) NEGATIVE   Microscopic Only, Urine   Result Value Ref Range     WBC, Urine >50 (A) 1-5, NONE /HPF     WBC Clumps, Urine FEW Reference range not established. /HPF     RBC, Urine >20 (A) NONE, 1-2, 3-5 /HPF     Squamous Epithelial Cells, Urine 1-9 (SPARSE) Reference range not established. /HPF     Transitional Epithelial Cells, Urine 1-2 (FEW) Reference range not established. /HPF     Bacteria, Urine 1+ (A) NONE SEEN /HPF     Budding Yeast, Urine PRESENT (A) NONE /HPF     Mucus, Urine 1+ Reference range not established. /LPF     Hyaline Casts, Urine 2+ (A) NONE /LPF             Assessment/Plan   Danielle is a 73-year-old female patient presenting to emergency department for evaluation of shortness of breath and volume overload.  Patient found to have a BNP of 116, elevated troponin, elevated lactate, elevated WBCs.  Imaging showing moderate left pleural effusion with left basilar atelectasis and mild right basilar atelectasis.  Last echocardiogram on file 2/27/2023 with an EF of 60 to 65%.  Patient having new oxygen requirements moving from 2 L/min to 4 L/min.  Patient currently receiving IV Zosyn via PICC line at [facility] for recurrent UTI.  Patient found to be UTI positive, no culture sent due to being on antibiotics chronically.  Patient given IV Lasix for volume overload in ED.  Consult placed to cardiology.  Admission for further medical management.     Acute decompensated heart failure  Admit to telemetry per Dr. Carcamo  Intake and output  See imaging results above  Daily weight  Lasix IV 40 mg in ED  Consult cardiology and appreciate input  Low-sodium diet  Fluid restriction  Chronic Cruz catheter     Diabetes/hypothyroidism/hypertension/A-fib/hyperlipidemia     Diabetic diet  ISS  Hypoglycemia protocol  Hold home oral antidiabetic medications when med rec is complete  Continue home medications  Continue home Eliquis when med rec is complete     DVT PPx  Continue home Eliquis when med rec is complete  SCDs  Activity as tolerated     I spent 30 minutes in the professional and overall care of this patient.  Patient fully evaluated on November, plan as above.                      Revision History          Patient fully evaluated on November 11.  Debrox for bilateral ear cerumen impactions.  Continue to diurese patient and recheck labs in AM.     Patient fully evaluated on November 12.  Continue aggressive diuresis.  BUN and creatinine are stable.  Recheck labs in AM.    Patient fully evaluated on November 13.  Rise in BUN and creatinine noted and patient will  transition to oral diuretics.  Recheck labs in AM.  Recheck chemistries in a.m. to check for renal function    Thien Carcamo MD

## 2023-11-13 NOTE — CARE PLAN
Problem: Fall/Injury  Goal: Verbalize understanding of personal risk factors for fall in the hospital  Outcome: Progressing  Goal: Verbalize understanding of risk factor reduction measures to prevent injury from fall in the home  Outcome: Progressing  Goal: Use assistive devices by end of the shift  Outcome: Progressing  Goal: Pace activities to prevent fatigue by end of the shift  Outcome: Progressing     Problem: Heart Failure  Goal: Improved gas exchange this shift  Outcome: Progressing  Goal: Improved urinary output this shift  Outcome: Progressing  Goal: Reduction in peripheral edema within 24 hours  Outcome: Progressing  Goal: Report improvement of dyspnea/breathlessness this shift  Outcome: Progressing  Goal: Weight from fluid excess reduced over 2-3 days, then stabilize  Outcome: Progressing  Goal: Increase self care and/or family involvement in 24 hours  Outcome: Progressing     Problem: Skin  Goal: Decreased wound size/increased tissue granulation at next dressing change  Outcome: Progressing  Goal: Participates in plan/prevention/treatment measures  Outcome: Progressing  Goal: Prevent/manage excess moisture  Outcome: Progressing  Goal: Prevent/minimize sheer/friction injuries  Outcome: Progressing  Goal: Promote/optimize nutrition  Outcome: Progressing  Goal: Promote skin healing  Outcome: Progressing   The patient's goals for the shift include      The clinical goals for the shift include rest and comfort

## 2023-11-13 NOTE — PROGRESS NOTES
Cardiology Progress    Impression:  1.  HFpEF: Acute on chronic with predominant right-sided features.   Echocardiogram February 2023 showing normal EF, dilated RV with RV dysfunction, severe TR, RVSP 60.   2.  Permanent A-fib.  [id number]=4.  3.  Chronic lymphedema  4.  Anemia  5.  Hypertension  6.  Hyperlipidemia  7.  Diabetes  8.  Hypothyroidism  9.  CKD  10.  Urinary retention with chronic indwelling catheter with recurrent UTIs.  Plan:  Hold diuretics temporarily for rising creatinine  Follow labs  Continue Cardizem and metoprolol for rate control  Continue Eliquis  HPI:  Feels better overall.  No shortness of breath.  Still requiring some oxygen.  Meds:  Scheduled medications  apixaban, 5 mg, oral, BID  busPIRone, 5 mg, oral, BID  carbamide peroxide, 10 drop, Each Ear, BID  dilTIAZem CD, 240 mg, oral, Daily  furosemide, 40 mg, intravenous, q12h  gabapentin, 100 mg, oral, Daily  insulin lispro, 0-5 Units, subcutaneous, TID with meals  lactobacillus acidophilus, 1 tablet, oral, Daily  levothyroxine, 100 mcg, oral, Once per day on Mon  levothyroxine, 50 mcg, oral, Once per day on Sun Tue Wed Thu Fri Sat  metoprolol tartrate, 25 mg, oral, BID  pantoprazole, 40 mg, oral, Daily  piperacillin-tazobactam, 3.375 g, intravenous, q6h  polyethylene glycol, 17 g, oral, Daily  potassium chloride CR, 20 mEq, oral, Daily  predniSONE, 20 mg, oral, Daily  [Held by provider] torsemide, 20 mg, oral, Daily      Continuous medications     PRN medications  PRN medications: acetaminophen, bisacodyl, dextrose 10 % in water (D10W), dextrose, glucagon, LORazepam, nystatin, oxygen, traMADol, zinc oxide  Physical exam:  Vitals:    11/13/23 0815   BP: 92/54   Pulse: 63   Resp:    Temp: 35.3 °C (95.5 °F)   SpO2: 92%      JVP not visible.  Poor air entry.  Chronic lymphedema.  EKG:  Telemetry shows A-fib with a well-controlled ventricular response.  Echo:  February 2023 showing normal EF, dilated RV with RV dysfunction, severe TR, RVSP 60     Labs:  Lab Results   Component Value Date    WBC 14.3 (H) 11/13/2023    HGB 7.7 (L) 11/13/2023    HCT 27.8 (L) 11/13/2023     11/13/2023    CHOL 98 11/14/2022    TRIG 95 11/14/2022    HDL 38.6 (A) 11/14/2022    ALT 19 11/13/2023    AST 5 (L) 11/13/2023     11/13/2023    K 5.3 11/13/2023     11/13/2023    CREATININE 2.31 (H) 11/13/2023    BUN 68 (H) 11/13/2023    CO2 33 (H) 11/13/2023    TSH 1.27 02/27/2023    INR 1.0 11/09/2023    HGBA1C 6.5 (A) 02/14/2023     par

## 2023-11-14 ENCOUNTER — APPOINTMENT (OUTPATIENT)
Dept: RADIOLOGY | Facility: HOSPITAL | Age: 73
DRG: 291 | End: 2023-11-14
Payer: MEDICARE

## 2023-11-14 LAB
ALBUMIN SERPL BCP-MCNC: 2.8 G/DL (ref 3.4–5)
ALP SERPL-CCNC: 72 U/L (ref 33–136)
ALT SERPL W P-5'-P-CCNC: 18 U/L (ref 7–45)
ANION GAP SERPL CALC-SCNC: 14 MMOL/L (ref 10–20)
AST SERPL W P-5'-P-CCNC: 6 U/L (ref 9–39)
BILIRUB SERPL-MCNC: 0.2 MG/DL (ref 0–1.2)
BUN SERPL-MCNC: 72 MG/DL (ref 6–23)
CALCIUM SERPL-MCNC: 7.7 MG/DL (ref 8.6–10.3)
CHLORIDE SERPL-SCNC: 97 MMOL/L (ref 98–107)
CHLORIDE UR-SCNC: 21 MMOL/L
CHLORIDE/CREATININE (MMOL/G) IN URINE: 26 MMOL/G CREAT (ref 38–318)
CO2 SERPL-SCNC: 32 MMOL/L (ref 21–32)
CREAT SERPL-MCNC: 2.93 MG/DL (ref 0.5–1.05)
CREAT UR-MCNC: 80.2 MG/DL (ref 20–320)
EOSINOPHIL SMEAR: ABNORMAL
ERYTHROCYTE [DISTWIDTH] IN BLOOD BY AUTOMATED COUNT: 17.2 % (ref 11.5–14.5)
GFR SERPL CREATININE-BSD FRML MDRD: 17 ML/MIN/1.73M*2
GLUCOSE BLD MANUAL STRIP-MCNC: 377 MG/DL (ref 74–99)
GLUCOSE BLD MANUAL STRIP-MCNC: 405 MG/DL (ref 74–99)
GLUCOSE BLD MANUAL STRIP-MCNC: 417 MG/DL (ref 74–99)
GLUCOSE BLD MANUAL STRIP-MCNC: 417 MG/DL (ref 74–99)
GLUCOSE SERPL-MCNC: 383 MG/DL (ref 74–99)
HCT VFR BLD AUTO: 25.7 % (ref 36–46)
HGB BLD-MCNC: 7 G/DL (ref 12–16)
MCH RBC QN AUTO: 26 PG (ref 26–34)
MCHC RBC AUTO-ENTMCNC: 27.2 G/DL (ref 32–36)
MCV RBC AUTO: 96 FL (ref 80–100)
MICROALBUMIN UR-MCNC: 445.4 MG/L
MICROALBUMIN/CREAT UR: 555.4 UG/MG CREAT
NRBC BLD-RTO: 0.5 /100 WBCS (ref 0–0)
PLATELET # BLD AUTO: 209 X10*3/UL (ref 150–450)
POTASSIUM SERPL-SCNC: 5.5 MMOL/L (ref 3.5–5.3)
POTASSIUM UR-SCNC: 37 MMOL/L
POTASSIUM/CREAT UR-RTO: 46 MMOL/G CREAT
PROT SERPL-MCNC: 5.1 G/DL (ref 6.4–8.2)
RBC # BLD AUTO: 2.69 X10*6/UL (ref 4–5.2)
SODIUM SERPL-SCNC: 137 MMOL/L (ref 136–145)
SODIUM UR-SCNC: 25 MMOL/L
SODIUM/CREAT UR-RTO: 31 MMOL/G CREAT
UREA/CREAT UR-SRTO: 2.9 G/G CREAT
UUN UR-MCNC: 234 MG/DL
WBC # BLD AUTO: 14.2 X10*3/UL (ref 4.4–11.3)

## 2023-11-14 PROCEDURE — 2500000002 HC RX 250 W HCPCS SELF ADMINISTERED DRUGS (ALT 637 FOR MEDICARE OP, ALT 636 FOR OP/ED): Performed by: NURSE PRACTITIONER

## 2023-11-14 PROCEDURE — 84540 ASSAY OF URINE/UREA-N: CPT | Performed by: INTERNAL MEDICINE

## 2023-11-14 PROCEDURE — 2500000004 HC RX 250 GENERAL PHARMACY W/ HCPCS (ALT 636 FOR OP/ED): Performed by: INTERNAL MEDICINE

## 2023-11-14 PROCEDURE — 96372 THER/PROPH/DIAG INJ SC/IM: CPT | Performed by: INTERNAL MEDICINE

## 2023-11-14 PROCEDURE — 2500000004 HC RX 250 GENERAL PHARMACY W/ HCPCS (ALT 636 FOR OP/ED): Performed by: NURSE PRACTITIONER

## 2023-11-14 PROCEDURE — 71045 X-RAY EXAM CHEST 1 VIEW: CPT

## 2023-11-14 PROCEDURE — 2500000001 HC RX 250 WO HCPCS SELF ADMINISTERED DRUGS (ALT 637 FOR MEDICARE OP): Performed by: INTERNAL MEDICINE

## 2023-11-14 PROCEDURE — 6350000001 HC RX 635 EPOETIN >10,000 UNITS: Mod: JZ | Performed by: INTERNAL MEDICINE

## 2023-11-14 PROCEDURE — 85027 COMPLETE CBC AUTOMATED: CPT | Performed by: INTERNAL MEDICINE

## 2023-11-14 PROCEDURE — 89190 NASAL SMEAR FOR EOSINOPHILS: CPT | Performed by: INTERNAL MEDICINE

## 2023-11-14 PROCEDURE — 82043 UR ALBUMIN QUANTITATIVE: CPT | Performed by: INTERNAL MEDICINE

## 2023-11-14 PROCEDURE — 82947 ASSAY GLUCOSE BLOOD QUANT: CPT | Mod: 59

## 2023-11-14 PROCEDURE — 76775 US EXAM ABDO BACK WALL LIM: CPT | Performed by: RADIOLOGY

## 2023-11-14 PROCEDURE — 2500000001 HC RX 250 WO HCPCS SELF ADMINISTERED DRUGS (ALT 637 FOR MEDICARE OP): Performed by: NURSE PRACTITIONER

## 2023-11-14 PROCEDURE — 80053 COMPREHEN METABOLIC PANEL: CPT | Performed by: INTERNAL MEDICINE

## 2023-11-14 PROCEDURE — 2060000001 HC INTERMEDIATE ICU ROOM DAILY

## 2023-11-14 PROCEDURE — 82436 ASSAY OF URINE CHLORIDE: CPT | Performed by: INTERNAL MEDICINE

## 2023-11-14 PROCEDURE — 76770 US EXAM ABDO BACK WALL COMP: CPT

## 2023-11-14 PROCEDURE — 71045 X-RAY EXAM CHEST 1 VIEW: CPT | Performed by: RADIOLOGY

## 2023-11-14 PROCEDURE — 2500000002 HC RX 250 W HCPCS SELF ADMINISTERED DRUGS (ALT 637 FOR MEDICARE OP, ALT 636 FOR OP/ED): Performed by: INTERNAL MEDICINE

## 2023-11-14 RX ORDER — INSULIN LISPRO 100 [IU]/ML
4 INJECTION, SOLUTION INTRAVENOUS; SUBCUTANEOUS ONCE
Status: COMPLETED | OUTPATIENT
Start: 2023-11-14 | End: 2023-11-14

## 2023-11-14 RX ADMIN — PREDNISONE 20 MG: 20 TABLET ORAL at 08:45

## 2023-11-14 RX ADMIN — IRON SUCROSE 200 MG: 20 INJECTION, SOLUTION INTRAVENOUS at 20:56

## 2023-11-14 RX ADMIN — LEVOTHYROXINE SODIUM 50 MCG: 100 TABLET ORAL at 05:06

## 2023-11-14 RX ADMIN — ACETAMINOPHEN 325 MG: 325 TABLET ORAL at 11:21

## 2023-11-14 RX ADMIN — PIPERACILLIN SODIUM AND TAZOBACTAM SODIUM 3.38 G: 3; .375 INJECTION, SOLUTION INTRAVENOUS at 02:17

## 2023-11-14 RX ADMIN — INSULIN LISPRO 4 UNITS: 100 INJECTION, SOLUTION INTRAVENOUS; SUBCUTANEOUS at 17:13

## 2023-11-14 RX ADMIN — PANTOPRAZOLE SODIUM 40 MG: 40 TABLET, DELAYED RELEASE ORAL at 08:43

## 2023-11-14 RX ADMIN — PIPERACILLIN SODIUM AND TAZOBACTAM SODIUM 2.25 G: 2; .25 INJECTION, SOLUTION INTRAVENOUS at 21:00

## 2023-11-14 RX ADMIN — INSULIN LISPRO 5 UNITS: 100 INJECTION, SOLUTION INTRAVENOUS; SUBCUTANEOUS at 08:45

## 2023-11-14 RX ADMIN — ERYTHROPOIETIN 20000 UNITS: 20000 INJECTION, SOLUTION INTRAVENOUS; SUBCUTANEOUS at 22:47

## 2023-11-14 RX ADMIN — APIXABAN 2.5 MG: 2.5 TABLET, FILM COATED ORAL at 20:57

## 2023-11-14 RX ADMIN — BUSPIRONE HYDROCHLORIDE 5 MG: 5 TABLET ORAL at 08:43

## 2023-11-14 RX ADMIN — Medication 1 TABLET: at 08:45

## 2023-11-14 RX ADMIN — PIPERACILLIN SODIUM AND TAZOBACTAM SODIUM 2.25 G: 2; .25 INJECTION, SOLUTION INTRAVENOUS at 14:54

## 2023-11-14 RX ADMIN — PIPERACILLIN SODIUM AND TAZOBACTAM SODIUM 3.38 G: 3; .375 INJECTION, SOLUTION INTRAVENOUS at 08:42

## 2023-11-14 RX ADMIN — INSULIN LISPRO 4 UNITS: 100 INJECTION, SOLUTION INTRAVENOUS; SUBCUTANEOUS at 22:00

## 2023-11-14 RX ADMIN — Medication 10 DROP: at 09:00

## 2023-11-14 RX ADMIN — POLYETHYLENE GLYCOL 3350 17 G: 17 POWDER, FOR SOLUTION ORAL at 08:42

## 2023-11-14 RX ADMIN — GABAPENTIN 100 MG: 100 CAPSULE ORAL at 08:45

## 2023-11-14 RX ADMIN — INSULIN LISPRO 5 UNITS: 100 INJECTION, SOLUTION INTRAVENOUS; SUBCUTANEOUS at 11:21

## 2023-11-14 RX ADMIN — INSULIN LISPRO 5 UNITS: 100 INJECTION, SOLUTION INTRAVENOUS; SUBCUTANEOUS at 16:26

## 2023-11-14 RX ADMIN — SODIUM CHLORIDE 500 ML: 9 INJECTION, SOLUTION INTRAVENOUS at 22:01

## 2023-11-14 RX ADMIN — BUSPIRONE HYDROCHLORIDE 5 MG: 5 TABLET ORAL at 20:57

## 2023-11-14 RX ADMIN — APIXABAN 5 MG: 5 TABLET, FILM COATED ORAL at 08:45

## 2023-11-14 ASSESSMENT — COGNITIVE AND FUNCTIONAL STATUS - GENERAL
HELP NEEDED FOR BATHING: TOTAL
CLIMB 3 TO 5 STEPS WITH RAILING: TOTAL
TURNING FROM BACK TO SIDE WHILE IN FLAT BAD: TOTAL
MOVING FROM LYING ON BACK TO SITTING ON SIDE OF FLAT BED WITH BEDRAILS: TOTAL
PERSONAL GROOMING: TOTAL
TOILETING: TOTAL
DRESSING REGULAR LOWER BODY CLOTHING: TOTAL
WALKING IN HOSPITAL ROOM: TOTAL
MOBILITY SCORE: 6
MOVING TO AND FROM BED TO CHAIR: TOTAL
STANDING UP FROM CHAIR USING ARMS: TOTAL
DRESSING REGULAR UPPER BODY CLOTHING: TOTAL
EATING MEALS: A LITTLE
DAILY ACTIVITIY SCORE: 8

## 2023-11-14 ASSESSMENT — PAIN SCALES - GENERAL
PAINLEVEL_OUTOF10: 0 - NO PAIN
PAINLEVEL_OUTOF10: 0 - NO PAIN
PAINLEVEL_OUTOF10: 3

## 2023-11-14 ASSESSMENT — PAIN - FUNCTIONAL ASSESSMENT
PAIN_FUNCTIONAL_ASSESSMENT: 0-10
PAIN_FUNCTIONAL_ASSESSMENT: 0-10

## 2023-11-14 NOTE — CONSULTS
“Wound” Ostomy Consultation        1. Chief Complaint: wound check and evaluation   2. History of Present Illness:   sacral pressure ulcer   3. Past Medical History: Reviewed   4. Past Surgical History: Reviewed  5. Allergies:     Reviewed  6. Social/Family History: Reviewed  7. Medications:  Reviewed  8. Labs/Data/Test Results: Reviewed   9. Progress Notes:  Reviewed     10. System Review: system review was performed with these findings:   Integumentary: Will be described below    11.  Physical Examination:   Integumentary: Normal skin color, texture, and turgor, + dry skin; No ecchymotic areas; No friable skin; No lesions/excoriation/burns; No diaphoresis; No jaundice, matthew, pallor skin; LLE dry exudate over scar tissue, hydrated with lotion and washed off, + fungal rash     12. Wound Pain/Discomfort: No     14. Wound Assessment  Type of Wound: pressure   Stage/Thickness Level: III  Site: sacrum   Present on admission: yes   Measurements: Length: 1.5 cm Width: 1.0 cm Depth: <0.25 cm    Tunneling: No    Undermining: No     Edges:  pink,   Odor: none,   Edema: not present  Exudate: bleeds easy   Tissue Color: red  Tissue Type: unhealthy granulation,   Saima Wound Tissue: No erythema or warmth, indurated tissue, macerated tissue, desiccated tissue, healthy scar tissue??????????????????????????  Saima Wound Tissue Color: normal skin tone    Assessment/Plan/Treatment Recommendations:     LLE: Apply lotion on supply cart to scar tissue daily  Sacrum: apply Balmex ointment as ordered, may need to cover sacrum with calcium alginate if tissue remains friable.   Nystatin powder as ordered with intra dry - single layer   Turn as per policy offloading from pressure sites(s)  Interventions as per Tomas Scale are in place    Education provided; Treatment demonstrated; Questions answered  D/W RN / MD   Orders received     I have spent 30 minutes with the patient for physical and wound assessment, wound cleaning, dressing  demonstration and answering questions. More than 50% of the time spent with the patient included education/counselling/coordination of care.     Alanna Huntley RN WOCN

## 2023-11-14 NOTE — PROGRESS NOTES
11/14/23 1400   Discharge Planning   Type of Post Acute Facility Services Long term care   Patient expects to be discharged to: [facility]   Patient Choice   Patient / Family choosing to utilize agency / facility established prior to hospitalization Yes     Cardiology following. Patient receiving IV antibiotics. Discharge plan remains for patient to return to [facility], patient is a bed hold. Care transitions team will continue to follow for discharge planning.

## 2023-11-14 NOTE — PROGRESS NOTES
Cardiology Progress    Impression:  1.  HFpEF: Acute on chronic with predominant right-sided features.   Echocardiogram February 2023 showing normal EF, dilated RV with RV dysfunction, severe TR, RVSP 60.   2.  Permanent A-fib.  [id number]=5.  3.  Chronic lymphedema  4.  Anemia  5.  Hypertension  6.  Hyperlipidemia  7.  Diabetes  8.  Hypothyroidism  9.  CONOR on CKD  10.  Urinary retention with chronic indwelling catheter with recurrent UTIs.  Plan:  Diuretics remain on hold for rising creatinine.  Hold Cardizem for relative hypotension  Continue metoprolol  Continue Eliquis  HPI:  No problems overnight.  Breathing okay.  Meds:  Scheduled medications  apixaban, 5 mg, oral, BID  busPIRone, 5 mg, oral, BID  carbamide peroxide, 10 drop, Each Ear, BID  [Held by provider] dilTIAZem CD, 240 mg, oral, Daily  [Held by provider] furosemide, 40 mg, intravenous, q12h  gabapentin, 100 mg, oral, Daily  insulin lispro, 0-5 Units, subcutaneous, TID with meals  lactobacillus acidophilus, 1 tablet, oral, Daily  levothyroxine, 100 mcg, oral, Once per day on Mon  levothyroxine, 50 mcg, oral, Once per day on Sun Tue Wed Thu Fri Sat  metoprolol tartrate, 25 mg, oral, BID  pantoprazole, 40 mg, oral, Daily  piperacillin-tazobactam, 3.375 g, intravenous, q6h  polyethylene glycol, 17 g, oral, Daily  potassium chloride CR, 20 mEq, oral, Daily  predniSONE, 20 mg, oral, Daily  [Held by provider] torsemide, 20 mg, oral, Daily      Continuous medications     PRN medications  PRN medications: acetaminophen, bisacodyl, dextrose 10 % in water (D10W), dextrose, glucagon, LORazepam, nystatin, oxygen, traMADol, zinc oxide    Physical exam:  Vitals:    11/14/23 0315   BP: 94/56   Pulse: 63   Resp: 16   Temp: 36.3 °C (97.3 °F)   SpO2: 98%      JVP not visible.  Poor air entry bilaterally.  Chronic lymphedema.  EKG:  Telemetry shows A-fib with a well-controlled ventricular response  Echo:  February 2023 showing normal EF, dilated RV with RV dysfunction,  severe TR, RVSP 60      Labs:  Lab Results   Component Value Date    WBC 14.2 (H) 11/14/2023    HGB 7.0 (L) 11/14/2023    HCT 25.7 (L) 11/14/2023     11/14/2023    CHOL 98 11/14/2022    TRIG 95 11/14/2022    HDL 38.6 (A) 11/14/2022    ALT 18 11/14/2023    AST 6 (L) 11/14/2023     11/14/2023    K 5.5 (H) 11/14/2023    CL 97 (L) 11/14/2023    CREATININE 2.93 (H) 11/14/2023    BUN 72 (H) 11/14/2023    CO2 32 11/14/2023    TSH 1.27 02/27/2023    INR 1.0 11/09/2023    HGBA1C 6.5 (A) 02/14/2023     par

## 2023-11-15 LAB
ABO GROUP (TYPE) IN BLOOD: NORMAL
ALBUMIN SERPL BCP-MCNC: 2.9 G/DL (ref 3.4–5)
ALBUMIN SERPL BCP-MCNC: 2.9 G/DL (ref 3.4–5)
ALP SERPL-CCNC: 72 U/L (ref 33–136)
ALT SERPL W P-5'-P-CCNC: 18 U/L (ref 7–45)
ANION GAP SERPL CALC-SCNC: 14 MMOL/L (ref 10–20)
ANION GAP SERPL CALC-SCNC: 16 MMOL/L (ref 10–20)
ANTIBODY SCREEN: NORMAL
APPEARANCE UR: ABNORMAL
AST SERPL W P-5'-P-CCNC: 6 U/L (ref 9–39)
BACTERIA #/AREA URNS AUTO: ABNORMAL /HPF
BILIRUB SERPL-MCNC: 0.2 MG/DL (ref 0–1.2)
BILIRUB UR STRIP.AUTO-MCNC: NEGATIVE MG/DL
BUN SERPL-MCNC: 78 MG/DL (ref 6–23)
BUN SERPL-MCNC: 80 MG/DL (ref 6–23)
CALCIUM SERPL-MCNC: 7.5 MG/DL (ref 8.6–10.3)
CALCIUM SERPL-MCNC: 7.6 MG/DL (ref 8.6–10.3)
CHLORIDE SERPL-SCNC: 97 MMOL/L (ref 98–107)
CHLORIDE SERPL-SCNC: 99 MMOL/L (ref 98–107)
CHOLEST SERPL-MCNC: 180 MG/DL (ref 0–199)
CHOLESTEROL/HDL RATIO: 3.1
CO2 SERPL-SCNC: 30 MMOL/L (ref 21–32)
CO2 SERPL-SCNC: 30 MMOL/L (ref 21–32)
COLOR UR: ABNORMAL
CREAT SERPL-MCNC: 3.41 MG/DL (ref 0.5–1.05)
CREAT SERPL-MCNC: 3.67 MG/DL (ref 0.5–1.05)
ERYTHROCYTE [DISTWIDTH] IN BLOOD BY AUTOMATED COUNT: 17.4 % (ref 11.5–14.5)
FERRITIN SERPL-MCNC: 44 NG/ML (ref 8–150)
GFR SERPL CREATININE-BSD FRML MDRD: 13 ML/MIN/1.73M*2
GFR SERPL CREATININE-BSD FRML MDRD: 14 ML/MIN/1.73M*2
GLUCOSE BLD MANUAL STRIP-MCNC: 301 MG/DL (ref 74–99)
GLUCOSE BLD MANUAL STRIP-MCNC: 311 MG/DL (ref 74–99)
GLUCOSE BLD MANUAL STRIP-MCNC: 361 MG/DL (ref 74–99)
GLUCOSE BLD MANUAL STRIP-MCNC: 391 MG/DL (ref 74–99)
GLUCOSE BLD MANUAL STRIP-MCNC: 403 MG/DL (ref 74–99)
GLUCOSE SERPL-MCNC: 313 MG/DL (ref 74–99)
GLUCOSE SERPL-MCNC: 358 MG/DL (ref 74–99)
GLUCOSE UR STRIP.AUTO-MCNC: NEGATIVE MG/DL
HCT VFR BLD AUTO: 25 % (ref 36–46)
HDLC SERPL-MCNC: 58.7 MG/DL
HGB BLD-MCNC: 6.8 G/DL (ref 12–16)
KETONES UR STRIP.AUTO-MCNC: NEGATIVE MG/DL
LDLC SERPL CALC-MCNC: 96 MG/DL
LEUKOCYTE ESTERASE UR QL STRIP.AUTO: ABNORMAL
MAGNESIUM SERPL-MCNC: 2.09 MG/DL (ref 1.6–2.4)
MCH RBC QN AUTO: 26.2 PG (ref 26–34)
MCHC RBC AUTO-ENTMCNC: 27.2 G/DL (ref 32–36)
MCV RBC AUTO: 96 FL (ref 80–100)
MYOGLOBIN SERPL-MCNC: 110 UG/L
NITRITE UR QL STRIP.AUTO: NEGATIVE
NON HDL CHOLESTEROL: 121 MG/DL (ref 0–149)
NRBC BLD-RTO: 0.8 /100 WBCS (ref 0–0)
PH UR STRIP.AUTO: 5 [PH]
PHOSPHATE SERPL-MCNC: 10 MG/DL (ref 2.5–4.9)
PHOSPHATE SERPL-MCNC: 10.2 MG/DL (ref 2.5–4.9)
PLATELET # BLD AUTO: 181 X10*3/UL (ref 150–450)
POTASSIUM SERPL-SCNC: 5.4 MMOL/L (ref 3.5–5.3)
POTASSIUM SERPL-SCNC: 5.4 MMOL/L (ref 3.5–5.3)
PROT SERPL-MCNC: 5.2 G/DL (ref 6.4–8.2)
PROT UR STRIP.AUTO-MCNC: ABNORMAL MG/DL
PTH-INTACT SERPL-MCNC: 329.4 PG/ML (ref 18.5–88)
RBC # BLD AUTO: 2.6 X10*6/UL (ref 4–5.2)
RBC # UR STRIP.AUTO: ABNORMAL /UL
RBC #/AREA URNS AUTO: >20 /HPF
RH FACTOR (ANTIGEN D): NORMAL
SODIUM SERPL-SCNC: 138 MMOL/L (ref 136–145)
SODIUM SERPL-SCNC: 138 MMOL/L (ref 136–145)
SP GR UR STRIP.AUTO: 1.02
SQUAMOUS #/AREA URNS AUTO: ABNORMAL /HPF
TRIGL SERPL-MCNC: 128 MG/DL (ref 0–149)
URATE SERPL-MCNC: 8.3 MG/DL (ref 2.3–6.7)
UROBILINOGEN UR STRIP.AUTO-MCNC: <2 MG/DL
VLDL: 26 MG/DL (ref 0–40)
WBC # BLD AUTO: 13.2 X10*3/UL (ref 4.4–11.3)
WBC #/AREA URNS AUTO: >50 /HPF
WBC CLUMPS #/AREA URNS AUTO: ABNORMAL /HPF
YEAST BUDDING #/AREA UR COMP ASSIST: PRESENT /HPF

## 2023-11-15 PROCEDURE — 2500000001 HC RX 250 WO HCPCS SELF ADMINISTERED DRUGS (ALT 637 FOR MEDICARE OP): Performed by: NURSE PRACTITIONER

## 2023-11-15 PROCEDURE — 84100 ASSAY OF PHOSPHORUS: CPT | Mod: 59 | Performed by: INTERNAL MEDICINE

## 2023-11-15 PROCEDURE — 2060000001 HC INTERMEDIATE ICU ROOM DAILY

## 2023-11-15 PROCEDURE — 83970 ASSAY OF PARATHORMONE: CPT | Mod: PARLAB | Performed by: INTERNAL MEDICINE

## 2023-11-15 PROCEDURE — 2500000004 HC RX 250 GENERAL PHARMACY W/ HCPCS (ALT 636 FOR OP/ED): Performed by: INTERNAL MEDICINE

## 2023-11-15 PROCEDURE — 82728 ASSAY OF FERRITIN: CPT | Mod: PARLAB | Performed by: INTERNAL MEDICINE

## 2023-11-15 PROCEDURE — 83874 ASSAY OF MYOGLOBIN: CPT | Mod: PARLAB | Performed by: INTERNAL MEDICINE

## 2023-11-15 PROCEDURE — 80069 RENAL FUNCTION PANEL: CPT | Mod: CCI | Performed by: INTERNAL MEDICINE

## 2023-11-15 PROCEDURE — 2500000004 HC RX 250 GENERAL PHARMACY W/ HCPCS (ALT 636 FOR OP/ED): Mod: JZ | Performed by: INTERNAL MEDICINE

## 2023-11-15 PROCEDURE — 2500000001 HC RX 250 WO HCPCS SELF ADMINISTERED DRUGS (ALT 637 FOR MEDICARE OP): Performed by: INTERNAL MEDICINE

## 2023-11-15 PROCEDURE — 36430 TRANSFUSION BLD/BLD COMPNT: CPT

## 2023-11-15 PROCEDURE — 81001 URINALYSIS AUTO W/SCOPE: CPT | Performed by: INTERNAL MEDICINE

## 2023-11-15 PROCEDURE — 86920 COMPATIBILITY TEST SPIN: CPT

## 2023-11-15 PROCEDURE — 2500000002 HC RX 250 W HCPCS SELF ADMINISTERED DRUGS (ALT 637 FOR MEDICARE OP, ALT 636 FOR OP/ED): Performed by: NURSE PRACTITIONER

## 2023-11-15 PROCEDURE — 84075 ASSAY ALKALINE PHOSPHATASE: CPT | Performed by: INTERNAL MEDICINE

## 2023-11-15 PROCEDURE — 82947 ASSAY GLUCOSE BLOOD QUANT: CPT | Mod: 59

## 2023-11-15 PROCEDURE — 83735 ASSAY OF MAGNESIUM: CPT | Performed by: INTERNAL MEDICINE

## 2023-11-15 PROCEDURE — P9016 RBC LEUKOCYTES REDUCED: HCPCS

## 2023-11-15 PROCEDURE — P9047 ALBUMIN (HUMAN), 25%, 50ML: HCPCS | Mod: JZ | Performed by: INTERNAL MEDICINE

## 2023-11-15 PROCEDURE — 2500000004 HC RX 250 GENERAL PHARMACY W/ HCPCS (ALT 636 FOR OP/ED): Performed by: NURSE PRACTITIONER

## 2023-11-15 PROCEDURE — 85027 COMPLETE CBC AUTOMATED: CPT | Performed by: INTERNAL MEDICINE

## 2023-11-15 PROCEDURE — 84550 ASSAY OF BLOOD/URIC ACID: CPT | Performed by: INTERNAL MEDICINE

## 2023-11-15 PROCEDURE — 86901 BLOOD TYPING SEROLOGIC RH(D): CPT | Performed by: INTERNAL MEDICINE

## 2023-11-15 PROCEDURE — 80061 LIPID PANEL: CPT | Performed by: INTERNAL MEDICINE

## 2023-11-15 RX ORDER — ALBUMIN HUMAN 250 G/1000ML
25 SOLUTION INTRAVENOUS
Status: COMPLETED | OUTPATIENT
Start: 2023-11-15 | End: 2023-11-15

## 2023-11-15 RX ORDER — FAMOTIDINE 20 MG/1
20 TABLET, FILM COATED ORAL DAILY
Status: DISCONTINUED | OUTPATIENT
Start: 2023-11-15 | End: 2023-11-22 | Stop reason: HOSPADM

## 2023-11-15 RX ORDER — ALBUMIN HUMAN 250 G/1000ML
50 SOLUTION INTRAVENOUS ONCE
Status: DISCONTINUED | OUTPATIENT
Start: 2023-11-15 | End: 2023-11-15

## 2023-11-15 RX ORDER — TRIAMCINOLONE ACETONIDE 1 MG/G
CREAM TOPICAL 2 TIMES DAILY
Status: DISCONTINUED | OUTPATIENT
Start: 2023-11-15 | End: 2023-11-16

## 2023-11-15 RX ORDER — FUROSEMIDE 10 MG/ML
80 INJECTION INTRAMUSCULAR; INTRAVENOUS ONCE
Status: COMPLETED | OUTPATIENT
Start: 2023-11-15 | End: 2023-11-15

## 2023-11-15 RX ADMIN — FUROSEMIDE 80 MG: 10 INJECTION, SOLUTION INTRAMUSCULAR; INTRAVENOUS at 15:13

## 2023-11-15 RX ADMIN — INSULIN LISPRO 5 UNITS: 100 INJECTION, SOLUTION INTRAVENOUS; SUBCUTANEOUS at 11:49

## 2023-11-15 RX ADMIN — BUSPIRONE HYDROCHLORIDE 5 MG: 5 TABLET ORAL at 20:39

## 2023-11-15 RX ADMIN — APIXABAN 2.5 MG: 2.5 TABLET, FILM COATED ORAL at 08:08

## 2023-11-15 RX ADMIN — ALBUMIN HUMAN 25 G: 0.25 SOLUTION INTRAVENOUS at 16:52

## 2023-11-15 RX ADMIN — ACETAMINOPHEN 325 MG: 325 TABLET ORAL at 08:08

## 2023-11-15 RX ADMIN — TRIAMCINOLONE ACETONIDE: 1 CREAM TOPICAL at 20:42

## 2023-11-15 RX ADMIN — PIPERACILLIN SODIUM AND TAZOBACTAM SODIUM 2.25 G: 2; .25 INJECTION, SOLUTION INTRAVENOUS at 13:28

## 2023-11-15 RX ADMIN — INSULIN LISPRO 5 UNITS: 100 INJECTION, SOLUTION INTRAVENOUS; SUBCUTANEOUS at 07:56

## 2023-11-15 RX ADMIN — FAMOTIDINE 20 MG: 20 TABLET ORAL at 14:10

## 2023-11-15 RX ADMIN — Medication 10 DROP: at 20:42

## 2023-11-15 RX ADMIN — GABAPENTIN 100 MG: 100 CAPSULE ORAL at 08:08

## 2023-11-15 RX ADMIN — LEVOTHYROXINE SODIUM 50 MCG: 100 TABLET ORAL at 06:06

## 2023-11-15 RX ADMIN — METOPROLOL TARTRATE 25 MG: 25 TABLET, FILM COATED ORAL at 20:39

## 2023-11-15 RX ADMIN — ALBUMIN HUMAN 25 G: 0.25 SOLUTION INTRAVENOUS at 17:45

## 2023-11-15 RX ADMIN — PREDNISONE 20 MG: 20 TABLET ORAL at 08:08

## 2023-11-15 RX ADMIN — Medication 10 DROP: at 08:09

## 2023-11-15 RX ADMIN — INSULIN LISPRO 4 UNITS: 100 INJECTION, SOLUTION INTRAVENOUS; SUBCUTANEOUS at 16:52

## 2023-11-15 RX ADMIN — PIPERACILLIN SODIUM AND TAZOBACTAM SODIUM 2.25 G: 2; .25 INJECTION, SOLUTION INTRAVENOUS at 20:42

## 2023-11-15 RX ADMIN — BUSPIRONE HYDROCHLORIDE 5 MG: 5 TABLET ORAL at 08:08

## 2023-11-15 RX ADMIN — PANTOPRAZOLE SODIUM 40 MG: 40 TABLET, DELAYED RELEASE ORAL at 08:08

## 2023-11-15 RX ADMIN — Medication 1 TABLET: at 08:08

## 2023-11-15 RX ADMIN — IRON SUCROSE 100 MG: 20 INJECTION, SOLUTION INTRAVENOUS at 14:10

## 2023-11-15 RX ADMIN — POTASSIUM CHLORIDE 20 MEQ: 1500 TABLET, EXTENDED RELEASE ORAL at 08:08

## 2023-11-15 RX ADMIN — PIPERACILLIN SODIUM AND TAZOBACTAM SODIUM 2.25 G: 2; .25 INJECTION, SOLUTION INTRAVENOUS at 07:55

## 2023-11-15 RX ADMIN — TRIAMCINOLONE ACETONIDE: 1 CREAM TOPICAL at 15:56

## 2023-11-15 RX ADMIN — METOPROLOL TARTRATE 25 MG: 25 TABLET, FILM COATED ORAL at 08:08

## 2023-11-15 RX ADMIN — PIPERACILLIN SODIUM AND TAZOBACTAM SODIUM 2.25 G: 2; .25 INJECTION, SOLUTION INTRAVENOUS at 01:34

## 2023-11-15 ASSESSMENT — COGNITIVE AND FUNCTIONAL STATUS - GENERAL
WALKING IN HOSPITAL ROOM: TOTAL
STANDING UP FROM CHAIR USING ARMS: TOTAL
TOILETING: TOTAL
DRESSING REGULAR UPPER BODY CLOTHING: TOTAL
HELP NEEDED FOR BATHING: TOTAL
MOBILITY SCORE: 6
MOVING FROM LYING ON BACK TO SITTING ON SIDE OF FLAT BED WITH BEDRAILS: TOTAL
MOVING TO AND FROM BED TO CHAIR: TOTAL
PERSONAL GROOMING: TOTAL
DRESSING REGULAR LOWER BODY CLOTHING: TOTAL
CLIMB 3 TO 5 STEPS WITH RAILING: TOTAL
TURNING FROM BACK TO SIDE WHILE IN FLAT BAD: TOTAL

## 2023-11-15 ASSESSMENT — PAIN SCALES - GENERAL
PAINLEVEL_OUTOF10: 0 - NO PAIN
PAINLEVEL_OUTOF10: 0 - NO PAIN

## 2023-11-15 ASSESSMENT — PAIN - FUNCTIONAL ASSESSMENT: PAIN_FUNCTIONAL_ASSESSMENT: 0-10

## 2023-11-15 NOTE — CARE PLAN
Problem: Fall/Injury  Goal: Verbalize understanding of personal risk factors for fall in the hospital  Outcome: Progressing  Goal: Verbalize understanding of risk factor reduction measures to prevent injury from fall in the home  Outcome: Progressing  Goal: Use assistive devices by end of the shift  Outcome: Progressing  Goal: Pace activities to prevent fatigue by end of the shift  Outcome: Progressing     Problem: Heart Failure  Goal: Improved urinary output this shift  Outcome: Progressing  Goal: Reduction in peripheral edema within 24 hours  Outcome: Progressing  Goal: Report improvement of dyspnea/breathlessness this shift  Outcome: Progressing  Goal: Weight from fluid excess reduced over 2-3 days, then stabilize  Outcome: Progressing  Goal: Increase self care and/or family involvement in 24 hours  Outcome: Progressing     Problem: Skin  Goal: Decreased wound size/increased tissue granulation at next dressing change  Outcome: Progressing  Goal: Participates in plan/prevention/treatment measures  Outcome: Progressing  Goal: Prevent/manage excess moisture  Outcome: Progressing  Flowsheets (Taken 11/14/2023 2941)  Prevent/manage excess moisture:   Moisturize dry skin   Cleanse incontinence/protect with barrier cream  Goal: Prevent/minimize sheer/friction injuries  Outcome: Progressing  Flowsheets (Taken 11/14/2023 9605)  Prevent/minimize sheer/friction injuries:   HOB 30 degrees or less   Turn/reposition every 2 hours/use positioning/transfer devices   Use pull sheet   Utilize specialty bed per algorithm  Goal: Promote/optimize nutrition  Outcome: Progressing  Goal: Promote skin healing  Outcome: Progressing     Problem: Pain - Adult  Goal: Verbalizes/displays adequate comfort level or baseline comfort level  Outcome: Progressing     Problem: Safety - Adult  Goal: Free from fall injury  Outcome: Progressing     Problem: Discharge Planning  Goal: Discharge to home or other facility with appropriate  resources  Outcome: Progressing     Problem: Chronic Conditions and Co-morbidities  Goal: Patient's chronic conditions and co-morbidity symptoms are monitored and maintained or improved  Outcome: Progressing   The patient's goals for the shift include  rest and comfort    The clinical goals for the shift include maintain SBP >90 throughout shift    Over the shift, the patient did not make progress toward the following goals. Barriers to progression include fluid overload. Recommendations to address these barriers include elevate extremities, fluid restriction, monitor vitals, q2 turns for comfort.

## 2023-11-15 NOTE — CONSULTS
Inpatient consult to Nephrology  Consult performed by: Troy Patricio MD  Consult ordered by: Thien Carcamo MD          Reason For Consult  Acute kidney injury    History Of Present Illness  Danielle Parisi is a 73 y.o. female presenting with acute shortness of breath volume overload worsening renal function.  Patient has history of lymphedema and multiple other comorbidities including   #1 atrial fibrillation  #2 cardiomyopathy  3 diabetes type 2  #4 hypothyroidism  5 hypertension  6 hyperlipidemia  7 anemia of chronic illness  #8 morbid obesity  #9 obstructive sleep apnea  #10 DVT by history  #11 peripheral arterial insufficiency with chronic leg ulcers  #12 pyoderma gangrenosum  #13 history of acute kidney injury  14 chronic respiratory acidosis  15 recent urinary tract infection on IV antibiotics (Zosyn)  Patient baseline creatinine around 1.46.  Today serum creatinine bump to 2.93 with a drop of a GFR from the mid 30 range to 17  Patient labs are pending but urinalysis appears positive for eosinophils suggesting interstitial nephropathy.  Patient was on intravenous diuretics which are on hold due to deterioration of the renal functions.        Patient fractional excretion of urea is 11.9% suggesting prerenal status.  Patient hemoglobin yesterday was low at 7 today's level is pending is below 7 we will give some blood transfusions  patient also have low iron indices and a low iron saturations.  She will receive IV iron and erythropoietin.  Patient have spot urine albumin which is elevated at 555 suggesting overt proteinuria.  Patient   Chest x-ray disclosed near complete opacification of the left hemithorax with air bronchograms there is a shift of the heart mediastinum to the left and small right pleural effusion.  Ultrasound of the kidney the left kidney was not visualized right kidney showed cortical atrophy.  Prior study had shown several simple renal cysts.    Past Medical History  See present  illness         Social History  She reports that she has never smoked. She has never used smokeless tobacco. She reports that she does not drink alcohol and does not use drugs.    Family History  No family history on file.     Allergies  Penicillins and Sulfa (sulfonamide antibiotics)    Review of Systems    REVIEW OF SYSTEMS  GENERAL:  Negative for malaise, significant weight loss, fever  HEENT:  No changes in hearing or vision, no nose bleeds or other nasal problems and Negative for frequent or significant headaches  NECK:  Negative for lumps, goiter, pain and significant neck swelling  RESPIRATORY:  Negative for cough, wheezing and shortness of breath  CARDIOVASCULAR:  Negative for chest pain, leg swelling and palpitations  GI:  Negative for abdominal discomfort, blood in stools or black stools and change in bowel habits  :  Negative for dysuria, frequency and incontinence  MUSCULOSKELETAL:  Negative for joint pain or swelling, back pain, and muscle pain.  SKIN:  Negative for lesions, rash, and itching  PSYCH:  Negative for sleep disturbance, mood disorder and recent psychosocial stressors  HEMATOLOGY/LYMPHOLOGY:  Negative for prolonged bleeding, bruising easily, and swollen nodes.  ENDOCRINE:  Negative for cold or heat intolerance, polyuria, polydipsia and goiter  NEURO: Negative, denies any burning, tingling or numbness     Objective:   Vasc: DP and PT pulses are palpable bilateral.  CFT is less than 3 seconds bilateral.  Skin temperature is warm to cool proximal to distal bilateral.      Neuro:  Light touch is intact to the foot bilateral.  Protective sensation is intact to the foot when tested with the 5.07 SWM bilateral.  There is no clonus noted.  The hallux is downgoing bilateral.      Derm: Nails 1-5 bilateral are intact.  Skin is supple with normal texture and turgor noted.  Webspaces are clean, dry and intact bilateral.  There are no hyperkeratoses, ulcerations, verruca or other lesions noted.   "    Ortho: Muscle strength is 5/5 for all pedal groups tested.  Ankle joint, subtalar joint, 1st MPJ and lesser MPJ ROM is full and without pain or crepitus.  The foot type is rectus bilateral off weight bearing.  There are no structural deformities noted.       Physical Exam  General appearance; patient is mildly obtunded.  [place] obese  HEENT; there is periorbital edema pupils react to light and accommodation  Neck; no bruit thyromegaly or cervical lymphadenopathy  Lungs; bibasilar crackles on inspiration  Heart; irregular, no gallop no rubs  Abdomen; obese active peristalsis  Extremities; bilateral lymphedema with chronic ulcerations right more than left  Patient feet show eczema  Neurological; patient is lethargic no tremors or asterixis  Last Recorded Vitals  Blood pressure 111/65, pulse 91, temperature 35 °C (95 °F), temperature source Temporal, resp. rate 18, height 1.702 m (5' 7\"), weight (!) 168 kg (370 lb 6 oz), SpO2 98 %.    Relevant Results  Results for orders placed or performed during the hospital encounter of 11/09/23 (from the past 24 hour(s))   POCT GLUCOSE   Result Value Ref Range    POCT Glucose 417 (H) 74 - 99 mg/dL   POCT GLUCOSE   Result Value Ref Range    POCT Glucose 417 (H) 74 - 99 mg/dL   Urine electrolytes   Result Value Ref Range    Sodium, Urine Random 25 mmol/L    Sodium/Creatinine Ratio 31 Not established. mmol/g Creat    Potassium, Urine Random 37 mmol/L    Potassium/Creatinine Ratio 46 Not established mmol/g Creat    Chloride, Urine Random 21 mmol/L    Chloride/Creatinine Ratio 26 (L) 38 - 318 mmol/g creat    Creatinine, Urine Random 80.2 20.0 - 320.0 mg/dL   Urea Nitrogen, Urine Random   Result Value Ref Range    Urea Nitrogen, Urine Random 234 mg/dL    Creatinine, Urine Random 80.2 20.0 - 320.0 mg/dL    Urea Nitrogen/Creatinine Ratio 2.9 Not established. g/g creat   Eosinophil smear   Result Value Ref Range    Eosinophils Rare (N) (none)   Albumin, Urine Random   Result Value Ref " Range    Albumin, Urine Random 445.4 Not established mg/L    Creatinine, Urine Random 80.2 20.0 - 320.0 mg/dL    Albumin/Creatine Ratio 555.4 (H) <30.0 ug/mg Creat   POCT GLUCOSE   Result Value Ref Range    POCT Glucose 403 (H) 74 - 99 mg/dL   POCT GLUCOSE   Result Value Ref Range    POCT Glucose 391 (H) 74 - 99 mg/dL   POCT GLUCOSE   Result Value Ref Range    POCT Glucose 361 (H) 74 - 99 mg/dL       XR chest 1 view    Result Date: 11/15/2023  Interpreted By:  Johann Wood, STUDY: XR CHEST 1 VIEW;  11/14/2023 8:44 pm   INDICATION: Signs/Symptoms:Atelectasis.   COMPARISON: 11/09/2023   ACCESSION NUMBER(S): [id number]   ORDERING CLINICIAN: LINH RONQUILLO   FINDINGS: There is near-complete opacification of the left hemithorax with several air bronchograms centrally, and only aeration remaining at the apex. This has progressed from the prior exam and most likely due to an effusion with underlying consolidation. There is volume loss with shift of the heart mediastinum to the left. There is also blunting at the right cardio hepatic angle since the prior exam indicating an additional small right effusion.   ABDOMEN AND OTHER FINDINGS: No remarkable upper abdominal findings.   BONES: No acute osseous changes.       1.  Airspace disease as described.   Signed by: Johann Wood 11/15/2023 9:33 AM Dictation workstation:   [id number]    US renal complete    Result Date: 11/15/2023  Interpreted By:  Johann Wood, STUDY: US RENAL COMPLETE;  11/14/2023 8:42 pm   INDICATION: Signs/Symptoms:Renal failure.   COMPARISON: None.   ACCESSION NUMBER(S): [id number]   ORDERING CLINICIAN: LINH RONQUILLO   TECHNIQUE: Multiple images of the kidneys were obtained  , with color Doppler for blood flow.  It is noted the exam was limited due to patient habitus , limited mobility and continuous respiration.   FINDINGS: RIGHT KIDNEY: The right kidney measures 9.6 cm in length.  There is mild thinning of the renal cortex at the lower pole  indicating mild atrophy. The remaining cortex appears maintained in homogeneous..  No hydronephrosis or evidence of nephrolithiasis. Color Doppler demonstrates renal blood flow.   LEFT KIDNEY: The left kidney could not be visualized, presumably secondary to limiting factors as above.   BLADDER: Not visualized, also probably due to limiting factors and under distension.   OTHER FINDINGS: None significant.       Mild right renal cortical atrophy at the lower pole, otherwise no hydronephrosis. The left kidney and bladder were not visualized.   Signed by: Johann Wood 11/15/2023 8:39 AM Dictation workstation:   [id number]    XR chest 1 view    Result Date: 11/10/2023  STUDY: Chest Radiograph;  11/09/2023 10:56 PM INDICATION: Shortness of breath. COMPARISON: XR chest 03/16/2023, 03/15/2023.  ACCESSION NUMBER(S): [id number] ORDERING CLINICIAN: SACHIN WELDON TECHNIQUE:  Frontal chest was obtained at 2229 hours. FINDINGS: CARDIOMEDIASTINAL SILHOUETTE: Cardiomediastinal silhouette is markedly enlarged and stable in size and configuration.  LUNGS: Left-sided PICC line is noted terminating in the region of the proximal SVC/left subclavian junction.  Mild right basilar atelectasis.  Moderate left pleural effusion is suggested with underlying left basilar atelectasis and/or consolidation.  ABDOMEN: No remarkable upper abdominal findings.  BONES: No acute osseous changes.    Moderate left pleural effusion with left basilar atelectasis and/or consolidation.  Mild right basilar atelectasis. Signed by Nakul Rucker MD       Assessment/Plan     Acute kidney injury superimposed on chronic kidney disease  Simple renal cyst  Anasarca  Anemia of chronic disease and renal disease and iron deficiency  Congestive heart failure  Pneumonitis  Pyocystitis  Eosinophiluria  Rule out allergic interstitial nephritis  None nephrotic range proteinuria  DVT by history  Pyoderma gangrenosum  Atrial fibrillation  Plan;  Urinary indices  Renal  indices  IV iron/erythropoietin  Transfuse for hemoglobin less than 7  We will give patient 50 g of albumin and 80 IV of furosemide and observe response  Betamethasone for feet    Thank you very much for allowing me to participate in the care of this patient  I spent 60 minutes in the professional and overall care of this patient.

## 2023-11-15 NOTE — PROGRESS NOTES
Danielle Parisi is a 73 y.o. female on day 4 of admission presenting with Acute decompensated heart failure (CMS/HCC).      Subjective   Patient fully evaluated on November 11 and clinically improving.       Objective     Last Recorded Vitals  BP 94/56   Pulse 96   Temp 35.6 °C (96.1 °F)   Resp 18   Wt (!) 168 kg (370 lb 6 oz)   SpO2 96%   Intake/Output last 3 Shifts:    Intake/Output Summary (Last 24 hours) at 11/14/2023 1927  Last data filed at 11/14/2023 1800  Gross per 24 hour   Intake 795 ml   Output 250 ml   Net 545 ml         Admission Weight  Weight: 150 kg (330 lb) (11/09/23 2215)    Daily Weight  11/10/23 : (!) 168 kg (370 lb 6 oz)    Image Results  XR chest 1 view  Narrative: STUDY:  Chest Radiograph;  11/09/2023 10:56 PM   INDICATION:  Shortness of breath.  COMPARISON:  XR chest 03/16/2023, 03/15/2023.    ACCESSION NUMBER(S):  [id number]  ORDERING CLINICIAN:  SACHIN WELDON  TECHNIQUE:  Frontal chest was obtained at 2229 hours.  FINDINGS:  CARDIOMEDIASTINAL SILHOUETTE:  Cardiomediastinal silhouette is markedly enlarged and stable in size  and configuration.     LUNGS:  Left-sided PICC line is noted terminating in the region of the  proximal SVC/left subclavian junction.  Mild right basilar  atelectasis.  Moderate left pleural effusion is suggested with  underlying left basilar atelectasis and/or consolidation.     ABDOMEN:  No remarkable upper abdominal findings.     BONES:  No acute osseous changes.  Impression: Moderate left pleural effusion with left basilar atelectasis and/or  consolidation.  Mild right basilar atelectasis.  Signed by Nakul Rucker MD      Physical Exam    Relevant Results               Assessment/Plan   This patient currently has cardiac telemetry ordered; if you would like to modify or discontinue the telemetry order, click here to go to the orders activity to modify/discontinue the order.              Principal Problem:    Acute decompensated heart failure (CMS/HCC)           Thien Carcamo MD  Physician  Internal Medicine     H&P      Addendum     Date of Service: 11/10/2023  5:38 AM     Addendum       Expand All Collapse All    History Of Present Illness  Danielle Parisi is a 73 y.o. female presenting to emergency department from skilled nursing facility for evaluation of increasing shortness of breath and volume overload.  Patient has a PICC line and is on IV Zosyn for recurrent UTI.  Over the past few days she has had a worsening shortness of breath.  She does admit to increasing lower extremity edema.  Patient is basically bedbound, denies fever but does admit to a cough.  Patient denies recent illness, nausea, vomiting, diarrhea.     In ED, , glucose 183, BUN 59, creatinine 1.46, GFR 38, AST 8, lactate 3.6 with a repeat pending.  WBCs 14.6, hemoglobin 8.3, hematocrit 29.7, troponin 17 with a repeat pending.  Chest x-ray completed showing moderate left pleural effusion with left basilar atelectasis, mild right basilar atelectasis per radiology review.  Last echocardiogram on file 2/27/2023 showing an ejection fraction of 60 to 65% with a dilated right atrium and severe tricuspid regurgitation.  EKG showing A-fib at a rate of 90 without ST elevation or depression per ER physician review.  Patient given Lasix IV in ED.  Consult placed to cardiology.  Patient admitted to telemetry under the care of Dr. Carcamo who will continue to follow.  I was asked to do H&P and place initial admission orders.     Past Medical History  Hypertension, A-fib on Eliquis, CONOR, hypothyroidism, lymphedema, diabetes, hyperlipidemia, history of DVT, obesity     Surgical History  Patient denies     Social History  She has no history on file for tobacco use, alcohol use, and drug use.     Family History  Reviewed and noncontributory     Allergies  Penicillins and Sulfa (sulfonamide antibiotics)     Review of Systems  A 10 point review of systems was completed and negative except what is listed in  "HPI  Physical Exam  Constitutional:       Appearance: She is obese.   HENT:      Mouth/Throat:      Mouth: Mucous membranes are dry.      Pharynx: Oropharynx is clear.   Eyes:      Pupils: Pupils are equal, round, and reactive to light.   Cardiovascular:      Rate and Rhythm: Rhythm irregularly irregular.   Pulmonary:      Breath sounds: Rales present.   Abdominal:      General: Bowel sounds are normal.   Musculoskeletal:         General: Normal range of motion.      Cervical back: Normal range of motion.   Skin:     General: Skin is warm and dry.      Capillary Refill: Capillary refill takes less than 2 seconds.   Neurological:      General: No focal deficit present.      Mental Status: She is alert. Mental status is at baseline.   Psychiatric:         Mood and Affect: Mood normal.         Behavior: Behavior normal.            Last Recorded Vitals  Blood pressure 102/56, pulse 104, temperature 35.8 °C (96.4 °F), resp. rate 18, height 1.702 m (5' 7\"), weight (!) 168 kg (370 lb 6 oz), SpO2 97 %.     Relevant Results  XR chest 1 view     Result Date: 11/10/2023  STUDY: Chest Radiograph;  11/09/2023 10:56 PM INDICATION: Shortness of breath. COMPARISON: XR chest 03/16/2023, 03/15/2023.  ACCESSION NUMBER(S): [id number] ORDERING CLINICIAN: SACHIN WELDON TECHNIQUE:  Frontal chest was obtained at 2229 hours. FINDINGS: CARDIOMEDIASTINAL SILHOUETTE: Cardiomediastinal silhouette is markedly enlarged and stable in size and configuration.  LUNGS: Left-sided PICC line is noted terminating in the region of the proximal SVC/left subclavian junction.  Mild right basilar atelectasis.  Moderate left pleural effusion is suggested with underlying left basilar atelectasis and/or consolidation.  ABDOMEN: No remarkable upper abdominal findings.  BONES: No acute osseous changes.     Moderate left pleural effusion with left basilar atelectasis and/or consolidation.  Mild right basilar atelectasis. Signed by Nakul Rucker MD              "     Results for orders placed or performed during the hospital encounter of 11/09/23 (from the past 24 hour(s))   CBC and Auto Differential   Result Value Ref Range     WBC 14.6 (H) 4.4 - 11.3 x10*3/uL     nRBC 0.3 (H) 0.0 - 0.0 /100 WBCs     RBC 3.15 (L) 4.00 - 5.20 x10*6/uL     Hemoglobin 8.3 (L) 12.0 - 16.0 g/dL     Hematocrit 29.7 (L) 36.0 - 46.0 %     MCV 94 80 - 100 fL     MCH 26.3 26.0 - 34.0 pg     MCHC 27.9 (L) 32.0 - 36.0 g/dL     RDW 17.8 (H) 11.5 - 14.5 %     Platelets 306 150 - 450 x10*3/uL     Neutrophils % 92.8 40.0 - 80.0 %     Immature Granulocytes %, Automated 1.0 (H) 0.0 - 0.9 %     Lymphocytes % 2.6 13.0 - 44.0 %     Monocytes % 3.4 2.0 - 10.0 %     Eosinophils % 0.1 0.0 - 6.0 %     Basophils % 0.1 0.0 - 2.0 %     Neutrophils Absolute 13.55 (H) 1.60 - 5.50 x10*3/uL     Immature Granulocytes Absolute, Automated 0.15 0.00 - 0.50 x10*3/uL     Lymphocytes Absolute 0.38 (L) 0.80 - 3.00 x10*3/uL     Monocytes Absolute 0.50 0.05 - 0.80 x10*3/uL     Eosinophils Absolute 0.01 0.00 - 0.40 x10*3/uL     Basophils Absolute 0.02 0.00 - 0.10 x10*3/uL   Magnesium   Result Value Ref Range     Magnesium 1.67 1.60 - 2.40 mg/dL   Comprehensive metabolic panel   Result Value Ref Range     Glucose 183 (H) 74 - 99 mg/dL     Sodium 142 136 - 145 mmol/L     Potassium 4.0 3.5 - 5.3 mmol/L     Chloride 102 98 - 107 mmol/L     Bicarbonate 32 21 - 32 mmol/L     Anion Gap 12 10 - 20 mmol/L     Urea Nitrogen 59 (H) 6 - 23 mg/dL     Creatinine 1.46 (H) 0.50 - 1.05 mg/dL     eGFR 38 (L) >60 mL/min/1.73m*2     Calcium 8.5 (L) 8.6 - 10.3 mg/dL     Albumin 3.0 (L) 3.4 - 5.0 g/dL     Alkaline Phosphatase 89 33 - 136 U/L     Total Protein 5.6 (L) 6.4 - 8.2 g/dL     AST 8 (L) 9 - 39 U/L     Bilirubin, Total 0.3 0.0 - 1.2 mg/dL     ALT 25 7 - 45 U/L   Protime-INR   Result Value Ref Range     Protime 11.6 9.8 - 12.8 seconds     INR 1.0 0.9 - 1.1   aPTT   Result Value Ref Range     aPTT 30 27 - 38 seconds   Lactate   Result Value Ref  Range     Lactate 3.6 (H) 0.4 - 2.0 mmol/L   Type And Screen   Result Value Ref Range     ABO TYPE O       Rh TYPE POS       ANTIBODY SCREEN NEG     Troponin I, High Sensitivity   Result Value Ref Range     Troponin I, High Sensitivity 17 (H) 0 - 13 ng/L   B-Type Natriuretic Peptide   Result Value Ref Range      (H) 0 - 99 pg/mL   Blood Gas Venous   Result Value Ref Range     POCT pH, Venous 7.30 (L) 7.33 - 7.43 pH     POCT pCO2, Venous 64 (H) 41 - 51 mm Hg     POCT pO2, Venous 36 35 - 45 mm Hg     POCT SO2, Venous 61 45 - 75 %     POCT Oxy Hemoglobin, Venous 59.0 45.0 - 75.0 %     POCT Base Excess, Venous 3.2 (H) -2.0 - 3.0 mmol/L     POCT HCO3 Calculated, Venous 31.5 (H) 22.0 - 26.0 mmol/L     Patient Temperature 37.0 degrees Celsius     FiO2 36 %     Test Comment ICU-S     Urinalysis with Reflex Microscopic and Culture   Result Value Ref Range     Color, Urine Yellow Straw, Yellow     Appearance, Urine Hazy (N) Clear     Specific Gravity, Urine 1.015 1.005 - 1.035     pH, Urine 5.0 5.0, 5.5, 6.0, 6.5, 7.0, 7.5, 8.0     Protein, Urine NEGATIVE NEGATIVE mg/dL     Glucose, Urine NEGATIVE NEGATIVE mg/dL     Blood, Urine MODERATE (2+) (A) NEGATIVE     Ketones, Urine NEGATIVE NEGATIVE mg/dL     Bilirubin, Urine NEGATIVE NEGATIVE     Urobilinogen, Urine <2.0 <2.0 mg/dL     Nitrite, Urine NEGATIVE NEGATIVE     Leukocyte Esterase, Urine LARGE (3+) (A) NEGATIVE   Microscopic Only, Urine   Result Value Ref Range     WBC, Urine >50 (A) 1-5, NONE /HPF     WBC Clumps, Urine FEW Reference range not established. /HPF     RBC, Urine >20 (A) NONE, 1-2, 3-5 /HPF     Squamous Epithelial Cells, Urine 1-9 (SPARSE) Reference range not established. /HPF     Transitional Epithelial Cells, Urine 1-2 (FEW) Reference range not established. /HPF     Bacteria, Urine 1+ (A) NONE SEEN /HPF     Budding Yeast, Urine PRESENT (A) NONE /HPF     Mucus, Urine 1+ Reference range not established. /LPF     Hyaline Casts, Urine 2+ (A) NONE /LPF             Assessment/Plan   Danielle is a 73-year-old female patient presenting to emergency department for evaluation of shortness of breath and volume overload.  Patient found to have a BNP of 116, elevated troponin, elevated lactate, elevated WBCs.  Imaging showing moderate left pleural effusion with left basilar atelectasis and mild right basilar atelectasis.  Last echocardiogram on file 2/27/2023 with an EF of 60 to 65%.  Patient having new oxygen requirements moving from 2 L/min to 4 L/min.  Patient currently receiving IV Zosyn via PICC line at [facility] for recurrent UTI.  Patient found to be UTI positive, no culture sent due to being on antibiotics chronically.  Patient given IV Lasix for volume overload in ED.  Consult placed to cardiology.  Admission for further medical management.     Acute decompensated heart failure  Admit to telemetry per Dr. Carcamo  Intake and output  See imaging results above  Daily weight  Lasix IV 40 mg in ED  Consult cardiology and appreciate input  Low-sodium diet  Fluid restriction  Chronic Cruz catheter     Diabetes/hypothyroidism/hypertension/A-fib/hyperlipidemia     Diabetic diet  ISS  Hypoglycemia protocol  Hold home oral antidiabetic medications when med rec is complete  Continue home medications  Continue home Eliquis when med rec is complete     DVT PPx  Continue home Eliquis when med rec is complete  SCDs  Activity as tolerated     I spent 30 minutes in the professional and overall care of this patient.  Patient fully evaluated on November, plan as above.                      Revision History          Patient fully evaluated on November 11.  Debrox for bilateral ear cerumen impactions.  Continue to diurese patient and recheck labs in AM.     Patient fully evaluated on November 12.  Continue aggressive diuresis.  BUN and creatinine are stable.  Recheck labs in AM.    Patient fully evaluated on November 13.  Rise in BUN and creatinine noted and patient will  transition to oral diuretics.  Recheck labs in AM.  Recheck chemistries in a.m. to check for renal function  Patient fully evaluated on November 14 and rise in BUN and creatinine noted.  Diuretics have been held as well as ARB's.  Nephrology consultation obtained.  Recheck labs in a.m.  Thien Carcamo MD

## 2023-11-15 NOTE — PROGRESS NOTES
Danielle Parisi is a 73 y.o. female on day 5 of admission presenting with Acute decompensated heart failure (CMS/HCC).      Subjective   Patient fully evaluated on November 11 and clinically improving.       Objective     Last Recorded Vitals  BP 92/60 (BP Location: Right arm, Patient Position: Lying)   Pulse 91   Temp 35.6 °C (96.1 °F) (Temporal)   Resp 18   Wt (!) 168 kg (370 lb 6 oz)   SpO2 98%   Intake/Output last 3 Shifts:    Intake/Output Summary (Last 24 hours) at 11/15/2023 1824  Last data filed at 11/15/2023 1746  Gross per 24 hour   Intake 920 ml   Output 250 ml   Net 670 ml         Admission Weight  Weight: 150 kg (330 lb) (11/09/23 2215)    Daily Weight  11/10/23 : (!) 168 kg (370 lb 6 oz)    Image Results  XR chest 1 view  Narrative: Interpreted By:  Johann Wood,   STUDY:  XR CHEST 1 VIEW;  11/14/2023 8:44 pm      INDICATION:  Signs/Symptoms:Atelectasis.      COMPARISON:  11/09/2023      ACCESSION NUMBER(S):  [id number]      ORDERING CLINICIAN:  LINH RONQUILLO      FINDINGS:  There is near-complete opacification of the left hemithorax with  several air bronchograms centrally, and only aeration remaining at  the apex. This has progressed from the prior exam and most likely due  to an effusion with underlying consolidation. There is volume loss  with shift of the heart mediastinum to the left. There is also  blunting at the right cardio hepatic angle since the prior exam  indicating an additional small right effusion.      ABDOMEN AND OTHER FINDINGS:  No remarkable upper abdominal findings.      BONES:  No acute osseous changes.      Impression: 1.  Airspace disease as described.      Signed by: Johann Wood 11/15/2023 9:33 AM  Dictation workstation:   [id number]  US renal complete  Narrative: Interpreted By:  Johann Wood,   STUDY:  US RENAL COMPLETE;  11/14/2023 8:42 pm      INDICATION:  Signs/Symptoms:Renal failure.      COMPARISON:  None.      ACCESSION NUMBER(S):  [id number]      ORDERING  CLINICIAN:  LINH RONQUILLO      TECHNIQUE:  Multiple images of the kidneys were obtained  , with color Doppler  for blood flow.  It is noted the exam was limited due to patient  habitus , limited mobility and continuous respiration.      FINDINGS:  RIGHT KIDNEY:  The right kidney measures 9.6 cm in length.  There is mild thinning  of the renal cortex at the lower pole indicating mild atrophy. The  remaining cortex appears maintained in homogeneous..  No  hydronephrosis or evidence of nephrolithiasis. Color Doppler  demonstrates renal blood flow.      LEFT KIDNEY:  The left kidney could not be visualized, presumably secondary to  limiting factors as above.      BLADDER:  Not visualized, also probably due to limiting factors and under  distension.      OTHER FINDINGS:  None significant.      Impression: Mild right renal cortical atrophy at the lower pole, otherwise no  hydronephrosis. The left kidney and bladder were not visualized.      Signed by: Johann Wood 11/15/2023 8:39 AM  Dictation workstation:   [id number]      Physical Exam    Relevant Results               Assessment/Plan   This patient currently has cardiac telemetry ordered; if you would like to modify or discontinue the telemetry order, click here to go to the orders activity to modify/discontinue the order.              Principal Problem:    Acute decompensated heart failure (CMS/[facility])          Thien Carcamo MD  Physician  Internal Medicine     H&P      Addendum     Date of Service: 11/10/2023  5:38 AM     Addendum       Expand All Collapse All    History Of Present Illness  Danielle Parisi is a 73 y.o. female presenting to emergency department from skilled nursing facility for evaluation of increasing shortness of breath and volume overload.  Patient has a PICC line and is on IV Zosyn for recurrent UTI.  Over the past few days she has had a worsening shortness of breath.  She does admit to increasing lower extremity edema.  Patient is basically  bedbound, denies fever but does admit to a cough.  Patient denies recent illness, nausea, vomiting, diarrhea.     In ED, , glucose 183, BUN 59, creatinine 1.46, GFR 38, AST 8, lactate 3.6 with a repeat pending.  WBCs 14.6, hemoglobin 8.3, hematocrit 29.7, troponin 17 with a repeat pending.  Chest x-ray completed showing moderate left pleural effusion with left basilar atelectasis, mild right basilar atelectasis per radiology review.  Last echocardiogram on file 2/27/2023 showing an ejection fraction of 60 to 65% with a dilated right atrium and severe tricuspid regurgitation.  EKG showing A-fib at a rate of 90 without ST elevation or depression per ER physician review.  Patient given Lasix IV in ED.  Consult placed to cardiology.  Patient admitted to telemetry under the care of Dr. Carcamo who will continue to follow.  I was asked to do H&P and place initial admission orders.     Past Medical History  Hypertension, A-fib on Eliquis, CONOR, hypothyroidism, lymphedema, diabetes, hyperlipidemia, history of DVT, obesity     Surgical History  Patient denies     Social History  She has no history on file for tobacco use, alcohol use, and drug use.     Family History  Reviewed and noncontributory     Allergies  Penicillins and Sulfa (sulfonamide antibiotics)     Review of Systems  A 10 point review of systems was completed and negative except what is listed in HPI  Physical Exam  Constitutional:       Appearance: She is obese.   HENT:      Mouth/Throat:      Mouth: Mucous membranes are dry.      Pharynx: Oropharynx is clear.   Eyes:      Pupils: Pupils are equal, round, and reactive to light.   Cardiovascular:      Rate and Rhythm: Rhythm irregularly irregular.   Pulmonary:      Breath sounds: Rales present.   Abdominal:      General: Bowel sounds are normal.   Musculoskeletal:         General: Normal range of motion.      Cervical back: Normal range of motion.   Skin:     General: Skin is warm and dry.      Capillary  "Refill: Capillary refill takes less than 2 seconds.   Neurological:      General: No focal deficit present.      Mental Status: She is alert. Mental status is at baseline.   Psychiatric:         Mood and Affect: Mood normal.         Behavior: Behavior normal.            Last Recorded Vitals  Blood pressure 102/56, pulse 104, temperature 35.8 °C (96.4 °F), resp. rate 18, height 1.702 m (5' 7\"), weight (!) 168 kg (370 lb 6 oz), SpO2 97 %.     Relevant Results  XR chest 1 view     Result Date: 11/10/2023  STUDY: Chest Radiograph;  11/09/2023 10:56 PM INDICATION: Shortness of breath. COMPARISON: XR chest 03/16/2023, 03/15/2023.  ACCESSION NUMBER(S): [id number] ORDERING CLINICIAN: SACHIN WELDON TECHNIQUE:  Frontal chest was obtained at 2229 hours. FINDINGS: CARDIOMEDIASTINAL SILHOUETTE: Cardiomediastinal silhouette is markedly enlarged and stable in size and configuration.  LUNGS: Left-sided PICC line is noted terminating in the region of the proximal SVC/left subclavian junction.  Mild right basilar atelectasis.  Moderate left pleural effusion is suggested with underlying left basilar atelectasis and/or consolidation.  ABDOMEN: No remarkable upper abdominal findings.  BONES: No acute osseous changes.     Moderate left pleural effusion with left basilar atelectasis and/or consolidation.  Mild right basilar atelectasis. Signed by Nakul Rucker MD                  Results for orders placed or performed during the hospital encounter of 11/09/23 (from the past 24 hour(s))   CBC and Auto Differential   Result Value Ref Range     WBC 14.6 (H) 4.4 - 11.3 x10*3/uL     nRBC 0.3 (H) 0.0 - 0.0 /100 WBCs     RBC 3.15 (L) 4.00 - 5.20 x10*6/uL     Hemoglobin 8.3 (L) 12.0 - 16.0 g/dL     Hematocrit 29.7 (L) 36.0 - 46.0 %     MCV 94 80 - 100 fL     MCH 26.3 26.0 - 34.0 pg     MCHC 27.9 (L) 32.0 - 36.0 g/dL     RDW 17.8 (H) 11.5 - 14.5 %     Platelets 306 150 - 450 x10*3/uL     Neutrophils % 92.8 40.0 - 80.0 %     Immature " Granulocytes %, Automated 1.0 (H) 0.0 - 0.9 %     Lymphocytes % 2.6 13.0 - 44.0 %     Monocytes % 3.4 2.0 - 10.0 %     Eosinophils % 0.1 0.0 - 6.0 %     Basophils % 0.1 0.0 - 2.0 %     Neutrophils Absolute 13.55 (H) 1.60 - 5.50 x10*3/uL     Immature Granulocytes Absolute, Automated 0.15 0.00 - 0.50 x10*3/uL     Lymphocytes Absolute 0.38 (L) 0.80 - 3.00 x10*3/uL     Monocytes Absolute 0.50 0.05 - 0.80 x10*3/uL     Eosinophils Absolute 0.01 0.00 - 0.40 x10*3/uL     Basophils Absolute 0.02 0.00 - 0.10 x10*3/uL   Magnesium   Result Value Ref Range     Magnesium 1.67 1.60 - 2.40 mg/dL   Comprehensive metabolic panel   Result Value Ref Range     Glucose 183 (H) 74 - 99 mg/dL     Sodium 142 136 - 145 mmol/L     Potassium 4.0 3.5 - 5.3 mmol/L     Chloride 102 98 - 107 mmol/L     Bicarbonate 32 21 - 32 mmol/L     Anion Gap 12 10 - 20 mmol/L     Urea Nitrogen 59 (H) 6 - 23 mg/dL     Creatinine 1.46 (H) 0.50 - 1.05 mg/dL     eGFR 38 (L) >60 mL/min/1.73m*2     Calcium 8.5 (L) 8.6 - 10.3 mg/dL     Albumin 3.0 (L) 3.4 - 5.0 g/dL     Alkaline Phosphatase 89 33 - 136 U/L     Total Protein 5.6 (L) 6.4 - 8.2 g/dL     AST 8 (L) 9 - 39 U/L     Bilirubin, Total 0.3 0.0 - 1.2 mg/dL     ALT 25 7 - 45 U/L   Protime-INR   Result Value Ref Range     Protime 11.6 9.8 - 12.8 seconds     INR 1.0 0.9 - 1.1   aPTT   Result Value Ref Range     aPTT 30 27 - 38 seconds   Lactate   Result Value Ref Range     Lactate 3.6 (H) 0.4 - 2.0 mmol/L   Type And Screen   Result Value Ref Range     ABO TYPE O       Rh TYPE POS       ANTIBODY SCREEN NEG     Troponin I, High Sensitivity   Result Value Ref Range     Troponin I, High Sensitivity 17 (H) 0 - 13 ng/L   B-Type Natriuretic Peptide   Result Value Ref Range      (H) 0 - 99 pg/mL   Blood Gas Venous   Result Value Ref Range     POCT pH, Venous 7.30 (L) 7.33 - 7.43 pH     POCT pCO2, Venous 64 (H) 41 - 51 mm Hg     POCT pO2, Venous 36 35 - 45 mm Hg     POCT SO2, Venous 61 45 - 75 %     POCT Oxy  Hemoglobin, Venous 59.0 45.0 - 75.0 %     POCT Base Excess, Venous 3.2 (H) -2.0 - 3.0 mmol/L     POCT HCO3 Calculated, Venous 31.5 (H) 22.0 - 26.0 mmol/L     Patient Temperature 37.0 degrees Celsius     FiO2 36 %     Test Comment ICU-S     Urinalysis with Reflex Microscopic and Culture   Result Value Ref Range     Color, Urine Yellow Straw, Yellow     Appearance, Urine Hazy (N) Clear     Specific Gravity, Urine 1.015 1.005 - 1.035     pH, Urine 5.0 5.0, 5.5, 6.0, 6.5, 7.0, 7.5, 8.0     Protein, Urine NEGATIVE NEGATIVE mg/dL     Glucose, Urine NEGATIVE NEGATIVE mg/dL     Blood, Urine MODERATE (2+) (A) NEGATIVE     Ketones, Urine NEGATIVE NEGATIVE mg/dL     Bilirubin, Urine NEGATIVE NEGATIVE     Urobilinogen, Urine <2.0 <2.0 mg/dL     Nitrite, Urine NEGATIVE NEGATIVE     Leukocyte Esterase, Urine LARGE (3+) (A) NEGATIVE   Microscopic Only, Urine   Result Value Ref Range     WBC, Urine >50 (A) 1-5, NONE /HPF     WBC Clumps, Urine FEW Reference range not established. /HPF     RBC, Urine >20 (A) NONE, 1-2, 3-5 /HPF     Squamous Epithelial Cells, Urine 1-9 (SPARSE) Reference range not established. /HPF     Transitional Epithelial Cells, Urine 1-2 (FEW) Reference range not established. /HPF     Bacteria, Urine 1+ (A) NONE SEEN /HPF     Budding Yeast, Urine PRESENT (A) NONE /HPF     Mucus, Urine 1+ Reference range not established. /LPF     Hyaline Casts, Urine 2+ (A) NONE /LPF            Assessment/Plan   Danielle is a 73-year-old female patient presenting to emergency department for evaluation of shortness of breath and volume overload.  Patient found to have a BNP of 116, elevated troponin, elevated lactate, elevated WBCs.  Imaging showing moderate left pleural effusion with left basilar atelectasis and mild right basilar atelectasis.  Last echocardiogram on file 2/27/2023 with an EF of 60 to 65%.  Patient having new oxygen requirements moving from 2 L/min to 4 L/min.  Patient currently receiving IV Zosyn via PICC line at  skilled nursing facility for recurrent UTI.  Patient found to be UTI positive, no culture sent due to being on antibiotics chronically.  Patient given IV Lasix for volume overload in ED.  Consult placed to cardiology.  Admission for further medical management.     Acute decompensated heart failure  Admit to telemetry per Dr. Carcamo  Intake and output  See imaging results above  Daily weight  Lasix IV 40 mg in ED  Consult cardiology and appreciate input  Low-sodium diet  Fluid restriction  Chronic Cruz catheter     Diabetes/hypothyroidism/hypertension/A-fib/hyperlipidemia     Diabetic diet  ISS  Hypoglycemia protocol  Hold home oral antidiabetic medications when med rec is complete  Continue home medications  Continue home Eliquis when med rec is complete     DVT PPx  Continue home Eliquis when med rec is complete  SCDs  Activity as tolerated     I spent 30 minutes in the professional and overall care of this patient.  Patient fully evaluated on November, plan as above.                      Revision History          Patient fully evaluated on November 11.  Debrox for bilateral ear cerumen impactions.  Continue to diurese patient and recheck labs in AM.     Patient fully evaluated on November 12.  Continue aggressive diuresis.  BUN and creatinine are stable.  Recheck labs in AM.    Patient fully evaluated on November 13.  Rise in BUN and creatinine noted and patient will transition to oral diuretics.  Recheck labs in AM.  Recheck chemistries in a.m. to check for renal function  Patient fully evaluated on November 14 and rise in BUN and creatinine noted.  Diuretics have been held as well as ARB's.  Nephrology consultation obtained.  Recheck labs in a.m.    Patient fully evaluated on November 15.  Continue to monitor BUN and creatinine diuretics been held.  Thien Carcamo MD

## 2023-11-15 NOTE — PROGRESS NOTES
Cardiology Progress    Impression:  1.  HFpEF: Acute on chronic with predominant right-sided features.   Echocardiogram February 2023 showing normal EF, dilated RV with RV dysfunction, severe TR, RVSP 60.   2.  Permanent A-fib.  [id number].  3.  Chronic lymphedema  4.  Anemia  5.  Hypertension  6.  Hyperlipidemia  7.  Diabetes  8.  Hypothyroidism  9.  CONOR on CKD  10.  Urinary retention with chronic indwelling catheter with recurrent UTIs.  Plan:  Diuretics remain on hold for elevated creatinine.  Hold Cardizem for relative hypotension  Continue metoprolol  Continue Eliquis  HPI:  Overnight blood pressure was low.  Received some fluids.  Blood pressure better today.  She feels okay.  Has not been out of bed.  No shortness of breath at rest.  Diuretics, Cardizem remain on hold.  Meds:  Scheduled medications  apixaban, 2.5 mg, oral, BID  busPIRone, 5 mg, oral, BID  carbamide peroxide, 10 drop, Each Ear, BID  [Held by provider] dilTIAZem CD, 240 mg, oral, Daily  [Held by provider] furosemide, 40 mg, intravenous, q12h  gabapentin, 100 mg, oral, Daily  insulin lispro, 0-5 Units, subcutaneous, TID with meals  lactobacillus acidophilus, 1 tablet, oral, Daily  levothyroxine, 100 mcg, oral, Once per day on Mon  levothyroxine, 50 mcg, oral, Once per day on Sun Tue Wed Thu Fri Sat  metoprolol tartrate, 25 mg, oral, BID  pantoprazole, 40 mg, oral, Daily  piperacillin-tazobactam, 2.25 g, intravenous, q6h  polyethylene glycol, 17 g, oral, Daily  potassium chloride CR, 20 mEq, oral, Daily  predniSONE, 20 mg, oral, Daily  [Held by provider] torsemide, 20 mg, oral, Daily      Continuous medications     PRN medications  PRN medications: acetaminophen, bisacodyl, dextrose 10 % in water (D10W), dextrose, glucagon, LORazepam, nystatin, oxygen, traMADol, zinc oxide    Physical exam:  Vitals:    11/15/23 1120   BP: 111/65   Pulse: 91   Resp: 18   Temp: 35 °C (95 °F)   SpO2: 98%      JVP not visible.  Poor air entry bilaterally.   Chronic lymphedema.  EKG:  Telemetry shows A-fib with a well-controlled ventricular response  Echo:  February 2023 showing normal EF, dilated RV with RV dysfunction, severe TR, RVSP 60       Labs:  Lab Results   Component Value Date    WBC 14.2 (H) 11/14/2023    HGB 7.0 (L) 11/14/2023    HCT 25.7 (L) 11/14/2023     11/14/2023    CHOL 98 11/14/2022    TRIG 95 11/14/2022    HDL 38.6 (A) 11/14/2022    ALT 18 11/14/2023    AST 6 (L) 11/14/2023     11/14/2023    K 5.5 (H) 11/14/2023    CL 97 (L) 11/14/2023    CREATININE 2.93 (H) 11/14/2023    BUN 72 (H) 11/14/2023    CO2 32 11/14/2023    TSH 1.27 02/27/2023    INR 1.0 11/09/2023    HGBA1C 6.5 (A) 02/14/2023     par

## 2023-11-16 LAB
ALBUMIN SERPL BCP-MCNC: 3.4 G/DL (ref 3.4–5)
ANION GAP SERPL CALC-SCNC: 15 MMOL/L (ref 10–20)
BLOOD EXPIRATION DATE: NORMAL
BUN SERPL-MCNC: 81 MG/DL (ref 6–23)
CALCIUM SERPL-MCNC: 7.5 MG/DL (ref 8.6–10.3)
CHLORIDE SERPL-SCNC: 96 MMOL/L (ref 98–107)
CO2 SERPL-SCNC: 29 MMOL/L (ref 21–32)
CREAT SERPL-MCNC: 3.62 MG/DL (ref 0.5–1.05)
DISPENSE STATUS: NORMAL
ERYTHROCYTE [DISTWIDTH] IN BLOOD BY AUTOMATED COUNT: 17.7 % (ref 11.5–14.5)
GFR SERPL CREATININE-BSD FRML MDRD: 13 ML/MIN/1.73M*2
GLUCOSE BLD MANUAL STRIP-MCNC: 269 MG/DL (ref 74–99)
GLUCOSE BLD MANUAL STRIP-MCNC: 308 MG/DL (ref 74–99)
GLUCOSE BLD MANUAL STRIP-MCNC: 357 MG/DL (ref 74–99)
GLUCOSE BLD MANUAL STRIP-MCNC: 375 MG/DL (ref 74–99)
GLUCOSE SERPL-MCNC: 352 MG/DL (ref 74–99)
HCT VFR BLD AUTO: 25.1 % (ref 36–46)
HEMOCCULT SP1 STL QL: POSITIVE
HGB BLD-MCNC: 7.2 G/DL (ref 12–16)
MCH RBC QN AUTO: 26.7 PG (ref 26–34)
MCHC RBC AUTO-ENTMCNC: 28.7 G/DL (ref 32–36)
MCV RBC AUTO: 93 FL (ref 80–100)
NRBC BLD-RTO: 1.7 /100 WBCS (ref 0–0)
PHOSPHATE SERPL-MCNC: 9.8 MG/DL (ref 2.5–4.9)
PLATELET # BLD AUTO: 206 X10*3/UL (ref 150–450)
POTASSIUM SERPL-SCNC: 5.8 MMOL/L (ref 3.5–5.3)
PRODUCT BLOOD TYPE: 5100
PRODUCT CODE: NORMAL
RBC # BLD AUTO: 2.7 X10*6/UL (ref 4–5.2)
SODIUM SERPL-SCNC: 134 MMOL/L (ref 136–145)
UNIT ABO: NORMAL
UNIT NUMBER: NORMAL
UNIT RH: NORMAL
UNIT VOLUME: 350
WBC # BLD AUTO: 13 X10*3/UL (ref 4.4–11.3)
XM INTEP: NORMAL

## 2023-11-16 PROCEDURE — 85027 COMPLETE CBC AUTOMATED: CPT | Performed by: INTERNAL MEDICINE

## 2023-11-16 PROCEDURE — 2500000004 HC RX 250 GENERAL PHARMACY W/ HCPCS (ALT 636 FOR OP/ED): Performed by: INTERNAL MEDICINE

## 2023-11-16 PROCEDURE — 2500000001 HC RX 250 WO HCPCS SELF ADMINISTERED DRUGS (ALT 637 FOR MEDICARE OP): Performed by: INTERNAL MEDICINE

## 2023-11-16 PROCEDURE — 82947 ASSAY GLUCOSE BLOOD QUANT: CPT

## 2023-11-16 PROCEDURE — 2500000001 HC RX 250 WO HCPCS SELF ADMINISTERED DRUGS (ALT 637 FOR MEDICARE OP): Performed by: NURSE PRACTITIONER

## 2023-11-16 PROCEDURE — 30233N1 TRANSFUSION OF NONAUTOLOGOUS RED BLOOD CELLS INTO PERIPHERAL VEIN, PERCUTANEOUS APPROACH: ICD-10-PCS | Performed by: INTERNAL MEDICINE

## 2023-11-16 PROCEDURE — 82270 OCCULT BLOOD FECES: CPT | Performed by: INTERNAL MEDICINE

## 2023-11-16 PROCEDURE — 2500000002 HC RX 250 W HCPCS SELF ADMINISTERED DRUGS (ALT 637 FOR MEDICARE OP, ALT 636 FOR OP/ED): Performed by: NURSE PRACTITIONER

## 2023-11-16 PROCEDURE — 2060000001 HC INTERMEDIATE ICU ROOM DAILY

## 2023-11-16 PROCEDURE — 80069 RENAL FUNCTION PANEL: CPT | Performed by: INTERNAL MEDICINE

## 2023-11-16 PROCEDURE — 2500000004 HC RX 250 GENERAL PHARMACY W/ HCPCS (ALT 636 FOR OP/ED): Performed by: NURSE PRACTITIONER

## 2023-11-16 RX ORDER — TRIAMCINOLONE ACETONIDE 1 MG/G
OINTMENT TOPICAL 2 TIMES DAILY
Status: DISCONTINUED | OUTPATIENT
Start: 2023-11-16 | End: 2023-11-22 | Stop reason: HOSPADM

## 2023-11-16 RX ADMIN — METOPROLOL TARTRATE 25 MG: 25 TABLET, FILM COATED ORAL at 21:04

## 2023-11-16 RX ADMIN — FAMOTIDINE 20 MG: 20 TABLET ORAL at 09:08

## 2023-11-16 RX ADMIN — Medication 10 DROP: at 10:14

## 2023-11-16 RX ADMIN — INSULIN LISPRO 5 UNITS: 100 INJECTION, SOLUTION INTRAVENOUS; SUBCUTANEOUS at 12:26

## 2023-11-16 RX ADMIN — Medication 1 TABLET: at 09:08

## 2023-11-16 RX ADMIN — TRIAMCINOLONE ACETONIDE: 1 OINTMENT TOPICAL at 21:00

## 2023-11-16 RX ADMIN — POLYETHYLENE GLYCOL 3350 17 G: 17 POWDER, FOR SOLUTION ORAL at 09:07

## 2023-11-16 RX ADMIN — LORAZEPAM 1 MG: 0.5 TABLET ORAL at 09:07

## 2023-11-16 RX ADMIN — TRAMADOL HYDROCHLORIDE 50 MG: 50 TABLET, COATED ORAL at 21:06

## 2023-11-16 RX ADMIN — Medication 10 DROP: at 21:05

## 2023-11-16 RX ADMIN — PREDNISONE 20 MG: 20 TABLET ORAL at 09:08

## 2023-11-16 RX ADMIN — INSULIN LISPRO 5 UNITS: 100 INJECTION, SOLUTION INTRAVENOUS; SUBCUTANEOUS at 09:08

## 2023-11-16 RX ADMIN — PIPERACILLIN SODIUM AND TAZOBACTAM SODIUM 2.25 G: 2; .25 INJECTION, SOLUTION INTRAVENOUS at 02:12

## 2023-11-16 RX ADMIN — SODIUM BICARBONATE 50 ML/HR: 84 INJECTION, SOLUTION INTRAVENOUS at 15:07

## 2023-11-16 RX ADMIN — PIPERACILLIN SODIUM AND TAZOBACTAM SODIUM 2.25 G: 2; .25 INJECTION, SOLUTION INTRAVENOUS at 21:04

## 2023-11-16 RX ADMIN — NYSTATIN 1 APPLICATION: 100000 POWDER TOPICAL at 10:14

## 2023-11-16 RX ADMIN — PIPERACILLIN SODIUM AND TAZOBACTAM SODIUM 2.25 G: 2; .25 INJECTION, SOLUTION INTRAVENOUS at 09:08

## 2023-11-16 RX ADMIN — LEVOTHYROXINE SODIUM 50 MCG: 100 TABLET ORAL at 05:20

## 2023-11-16 RX ADMIN — ZINC OXIDE 1 APPLICATION: 200 OINTMENT TOPICAL at 10:14

## 2023-11-16 RX ADMIN — IRON SUCROSE 100 MG: 20 INJECTION, SOLUTION INTRAVENOUS at 05:20

## 2023-11-16 RX ADMIN — BUSPIRONE HYDROCHLORIDE 5 MG: 5 TABLET ORAL at 09:08

## 2023-11-16 RX ADMIN — TRIAMCINOLONE ACETONIDE: 1 CREAM TOPICAL at 10:14

## 2023-11-16 RX ADMIN — PIPERACILLIN SODIUM AND TAZOBACTAM SODIUM 2.25 G: 2; .25 INJECTION, SOLUTION INTRAVENOUS at 15:11

## 2023-11-16 RX ADMIN — GABAPENTIN 100 MG: 100 CAPSULE ORAL at 09:08

## 2023-11-16 RX ADMIN — SODIUM ZIRCONIUM CYCLOSILICATE 10 G: 10 POWDER, FOR SUSPENSION ORAL at 12:25

## 2023-11-16 RX ADMIN — INSULIN LISPRO 3 UNITS: 100 INJECTION, SOLUTION INTRAVENOUS; SUBCUTANEOUS at 17:55

## 2023-11-16 RX ADMIN — BUSPIRONE HYDROCHLORIDE 5 MG: 5 TABLET ORAL at 21:05

## 2023-11-16 ASSESSMENT — COGNITIVE AND FUNCTIONAL STATUS - GENERAL
WALKING IN HOSPITAL ROOM: TOTAL
TOILETING: TOTAL
DRESSING REGULAR LOWER BODY CLOTHING: TOTAL
DRESSING REGULAR UPPER BODY CLOTHING: TOTAL
MOVING TO AND FROM BED TO CHAIR: TOTAL
STANDING UP FROM CHAIR USING ARMS: TOTAL
CLIMB 3 TO 5 STEPS WITH RAILING: TOTAL
EATING MEALS: A LITTLE
TURNING FROM BACK TO SIDE WHILE IN FLAT BAD: TOTAL
HELP NEEDED FOR BATHING: TOTAL
DAILY ACTIVITIY SCORE: 8
MOBILITY SCORE: 6
PERSONAL GROOMING: TOTAL
MOVING FROM LYING ON BACK TO SITTING ON SIDE OF FLAT BED WITH BEDRAILS: TOTAL

## 2023-11-16 ASSESSMENT — PAIN - FUNCTIONAL ASSESSMENT: PAIN_FUNCTIONAL_ASSESSMENT: 0-10

## 2023-11-16 ASSESSMENT — PAIN SCALES - GENERAL
PAINLEVEL_OUTOF10: 3
PAINLEVEL_OUTOF10: 9

## 2023-11-16 ASSESSMENT — PAIN DESCRIPTION - ORIENTATION: ORIENTATION: LEFT

## 2023-11-16 ASSESSMENT — PAIN DESCRIPTION - LOCATION: LOCATION: LEG

## 2023-11-16 NOTE — PROGRESS NOTES
Danielle Parisi is a 73 y.o. female on day 6 of admission presenting with Acute decompensated heart failure (CMS/HCC).      Subjective   Patient fully evaluated on November 11 and clinically improving.       Objective     Last Recorded Vitals  /65   Pulse 91   Temp 35.3 °C (95.5 °F)   Resp 20   Wt (!) 168 kg (370 lb 6 oz)   SpO2 98%   Intake/Output last 3 Shifts:    Intake/Output Summary (Last 24 hours) at 11/16/2023 1556  Last data filed at 11/16/2023 0621  Gross per 24 hour   Intake 770 ml   Output 300 ml   Net 470 ml         Admission Weight  Weight: 150 kg (330 lb) (11/09/23 2215)    Daily Weight  11/10/23 : (!) 168 kg (370 lb 6 oz)    Image Results  XR chest 1 view  Narrative: Interpreted By:  Johann Wood,   STUDY:  XR CHEST 1 VIEW;  11/14/2023 8:44 pm      INDICATION:  Signs/Symptoms:Atelectasis.      COMPARISON:  11/09/2023      ACCESSION NUMBER(S):  [id number]      ORDERING CLINICIAN:  LINH RONQUILLO      FINDINGS:  There is near-complete opacification of the left hemithorax with  several air bronchograms centrally, and only aeration remaining at  the apex. This has progressed from the prior exam and most likely due  to an effusion with underlying consolidation. There is volume loss  with shift of the heart mediastinum to the left. There is also  blunting at the right cardio hepatic angle since the prior exam  indicating an additional small right effusion.      ABDOMEN AND OTHER FINDINGS:  No remarkable upper abdominal findings.      BONES:  No acute osseous changes.      Impression: 1.  Airspace disease as described.      Signed by: Johann Wood 11/15/2023 9:33 AM  Dictation workstation:   [id number]  US renal complete  Narrative: Interpreted By:  Johann Wood,   STUDY:  US RENAL COMPLETE;  11/14/2023 8:42 pm      INDICATION:  Signs/Symptoms:Renal failure.      COMPARISON:  None.      ACCESSION NUMBER(S):  [id number]      ORDERING CLINICIAN:  LINH RONQUILLO      TECHNIQUE:  Multiple  images of the kidneys were obtained  , with color Doppler  for blood flow.  It is noted the exam was limited due to patient  habitus , limited mobility and continuous respiration.      FINDINGS:  RIGHT KIDNEY:  The right kidney measures 9.6 cm in length.  There is mild thinning  of the renal cortex at the lower pole indicating mild atrophy. The  remaining cortex appears maintained in homogeneous..  No  hydronephrosis or evidence of nephrolithiasis. Color Doppler  demonstrates renal blood flow.      LEFT KIDNEY:  The left kidney could not be visualized, presumably secondary to  limiting factors as above.      BLADDER:  Not visualized, also probably due to limiting factors and under  distension.      OTHER FINDINGS:  None significant.      Impression: Mild right renal cortical atrophy at the lower pole, otherwise no  hydronephrosis. The left kidney and bladder were not visualized.      Signed by: Johann Wood 11/15/2023 8:39 AM  Dictation workstation:   [id number]      Physical Exam    Relevant Results               Assessment/Plan   This patient currently has cardiac telemetry ordered; if you would like to modify or discontinue the telemetry order, click here to go to the orders activity to modify/discontinue the order.              Principal Problem:    Acute decompensated heart failure (CMS/HCC)          Thien Carcamo MD  Physician  Internal Medicine     H&P      Addendum     Date of Service: 11/10/2023  5:38 AM     Addendum       Expand All Collapse All    History Of Present Illness  Danielle Parisi is a 73 y.o. female presenting to emergency department from skilled nursing facility for evaluation of increasing shortness of breath and volume overload.  Patient has a PICC line and is on IV Zosyn for recurrent UTI.  Over the past few days she has had a worsening shortness of breath.  She does admit to increasing lower extremity edema.  Patient is basically bedbound, denies fever but does admit to a cough.  Patient  denies recent illness, nausea, vomiting, diarrhea.     In ED, , glucose 183, BUN 59, creatinine 1.46, GFR 38, AST 8, lactate 3.6 with a repeat pending.  WBCs 14.6, hemoglobin 8.3, hematocrit 29.7, troponin 17 with a repeat pending.  Chest x-ray completed showing moderate left pleural effusion with left basilar atelectasis, mild right basilar atelectasis per radiology review.  Last echocardiogram on file 2/27/2023 showing an ejection fraction of 60 to 65% with a dilated right atrium and severe tricuspid regurgitation.  EKG showing A-fib at a rate of 90 without ST elevation or depression per ER physician review.  Patient given Lasix IV in ED.  Consult placed to cardiology.  Patient admitted to telemetry under the care of Dr. Carcamo who will continue to follow.  I was asked to do H&P and place initial admission orders.     Past Medical History  Hypertension, A-fib on Eliquis, CONOR, hypothyroidism, lymphedema, diabetes, hyperlipidemia, history of DVT, obesity     Surgical History  Patient denies     Social History  She has no history on file for tobacco use, alcohol use, and drug use.     Family History  Reviewed and noncontributory     Allergies  Penicillins and Sulfa (sulfonamide antibiotics)     Review of Systems  A 10 point review of systems was completed and negative except what is listed in HPI  Physical Exam  Constitutional:       Appearance: She is obese.   HENT:      Mouth/Throat:      Mouth: Mucous membranes are dry.      Pharynx: Oropharynx is clear.   Eyes:      Pupils: Pupils are equal, round, and reactive to light.   Cardiovascular:      Rate and Rhythm: Rhythm irregularly irregular.   Pulmonary:      Breath sounds: Rales present.   Abdominal:      General: Bowel sounds are normal.   Musculoskeletal:         General: Normal range of motion.      Cervical back: Normal range of motion.   Skin:     General: Skin is warm and dry.      Capillary Refill: Capillary refill takes less than 2 seconds.  "  Neurological:      General: No focal deficit present.      Mental Status: She is alert. Mental status is at baseline.   Psychiatric:         Mood and Affect: Mood normal.         Behavior: Behavior normal.            Last Recorded Vitals  Blood pressure 102/56, pulse 104, temperature 35.8 °C (96.4 °F), resp. rate 18, height 1.702 m (5' 7\"), weight (!) 168 kg (370 lb 6 oz), SpO2 97 %.     Relevant Results  XR chest 1 view     Result Date: 11/10/2023  STUDY: Chest Radiograph;  11/09/2023 10:56 PM INDICATION: Shortness of breath. COMPARISON: XR chest 03/16/2023, 03/15/2023.  ACCESSION NUMBER(S): [id number] ORDERING CLINICIAN: SACHIN WELDON TECHNIQUE:  Frontal chest was obtained at 2229 hours. FINDINGS: CARDIOMEDIASTINAL SILHOUETTE: Cardiomediastinal silhouette is markedly enlarged and stable in size and configuration.  LUNGS: Left-sided PICC line is noted terminating in the region of the proximal SVC/left subclavian junction.  Mild right basilar atelectasis.  Moderate left pleural effusion is suggested with underlying left basilar atelectasis and/or consolidation.  ABDOMEN: No remarkable upper abdominal findings.  BONES: No acute osseous changes.     Moderate left pleural effusion with left basilar atelectasis and/or consolidation.  Mild right basilar atelectasis. Signed by Nakul Rucker MD                  Results for orders placed or performed during the hospital encounter of 11/09/23 (from the past 24 hour(s))   CBC and Auto Differential   Result Value Ref Range     WBC 14.6 (H) 4.4 - 11.3 x10*3/uL     nRBC 0.3 (H) 0.0 - 0.0 /100 WBCs     RBC 3.15 (L) 4.00 - 5.20 x10*6/uL     Hemoglobin 8.3 (L) 12.0 - 16.0 g/dL     Hematocrit 29.7 (L) 36.0 - 46.0 %     MCV 94 80 - 100 fL     MCH 26.3 26.0 - 34.0 pg     MCHC 27.9 (L) 32.0 - 36.0 g/dL     RDW 17.8 (H) 11.5 - 14.5 %     Platelets 306 150 - 450 x10*3/uL     Neutrophils % 92.8 40.0 - 80.0 %     Immature Granulocytes %, Automated 1.0 (H) 0.0 - 0.9 %     Lymphocytes " % 2.6 13.0 - 44.0 %     Monocytes % 3.4 2.0 - 10.0 %     Eosinophils % 0.1 0.0 - 6.0 %     Basophils % 0.1 0.0 - 2.0 %     Neutrophils Absolute 13.55 (H) 1.60 - 5.50 x10*3/uL     Immature Granulocytes Absolute, Automated 0.15 0.00 - 0.50 x10*3/uL     Lymphocytes Absolute 0.38 (L) 0.80 - 3.00 x10*3/uL     Monocytes Absolute 0.50 0.05 - 0.80 x10*3/uL     Eosinophils Absolute 0.01 0.00 - 0.40 x10*3/uL     Basophils Absolute 0.02 0.00 - 0.10 x10*3/uL   Magnesium   Result Value Ref Range     Magnesium 1.67 1.60 - 2.40 mg/dL   Comprehensive metabolic panel   Result Value Ref Range     Glucose 183 (H) 74 - 99 mg/dL     Sodium 142 136 - 145 mmol/L     Potassium 4.0 3.5 - 5.3 mmol/L     Chloride 102 98 - 107 mmol/L     Bicarbonate 32 21 - 32 mmol/L     Anion Gap 12 10 - 20 mmol/L     Urea Nitrogen 59 (H) 6 - 23 mg/dL     Creatinine 1.46 (H) 0.50 - 1.05 mg/dL     eGFR 38 (L) >60 mL/min/1.73m*2     Calcium 8.5 (L) 8.6 - 10.3 mg/dL     Albumin 3.0 (L) 3.4 - 5.0 g/dL     Alkaline Phosphatase 89 33 - 136 U/L     Total Protein 5.6 (L) 6.4 - 8.2 g/dL     AST 8 (L) 9 - 39 U/L     Bilirubin, Total 0.3 0.0 - 1.2 mg/dL     ALT 25 7 - 45 U/L   Protime-INR   Result Value Ref Range     Protime 11.6 9.8 - 12.8 seconds     INR 1.0 0.9 - 1.1   aPTT   Result Value Ref Range     aPTT 30 27 - 38 seconds   Lactate   Result Value Ref Range     Lactate 3.6 (H) 0.4 - 2.0 mmol/L   Type And Screen   Result Value Ref Range     ABO TYPE O       Rh TYPE POS       ANTIBODY SCREEN NEG     Troponin I, High Sensitivity   Result Value Ref Range     Troponin I, High Sensitivity 17 (H) 0 - 13 ng/L   B-Type Natriuretic Peptide   Result Value Ref Range      (H) 0 - 99 pg/mL   Blood Gas Venous   Result Value Ref Range     POCT pH, Venous 7.30 (L) 7.33 - 7.43 pH     POCT pCO2, Venous 64 (H) 41 - 51 mm Hg     POCT pO2, Venous 36 35 - 45 mm Hg     POCT SO2, Venous 61 45 - 75 %     POCT Oxy Hemoglobin, Venous 59.0 45.0 - 75.0 %     POCT Base Excess, Venous 3.2  (H) -2.0 - 3.0 mmol/L     POCT HCO3 Calculated, Venous 31.5 (H) 22.0 - 26.0 mmol/L     Patient Temperature 37.0 degrees Celsius     FiO2 36 %     Test Comment ICU-S     Urinalysis with Reflex Microscopic and Culture   Result Value Ref Range     Color, Urine Yellow Straw, Yellow     Appearance, Urine Hazy (N) Clear     Specific Gravity, Urine 1.015 1.005 - 1.035     pH, Urine 5.0 5.0, 5.5, 6.0, 6.5, 7.0, 7.5, 8.0     Protein, Urine NEGATIVE NEGATIVE mg/dL     Glucose, Urine NEGATIVE NEGATIVE mg/dL     Blood, Urine MODERATE (2+) (A) NEGATIVE     Ketones, Urine NEGATIVE NEGATIVE mg/dL     Bilirubin, Urine NEGATIVE NEGATIVE     Urobilinogen, Urine <2.0 <2.0 mg/dL     Nitrite, Urine NEGATIVE NEGATIVE     Leukocyte Esterase, Urine LARGE (3+) (A) NEGATIVE   Microscopic Only, Urine   Result Value Ref Range     WBC, Urine >50 (A) 1-5, NONE /HPF     WBC Clumps, Urine FEW Reference range not established. /HPF     RBC, Urine >20 (A) NONE, 1-2, 3-5 /HPF     Squamous Epithelial Cells, Urine 1-9 (SPARSE) Reference range not established. /HPF     Transitional Epithelial Cells, Urine 1-2 (FEW) Reference range not established. /HPF     Bacteria, Urine 1+ (A) NONE SEEN /HPF     Budding Yeast, Urine PRESENT (A) NONE /HPF     Mucus, Urine 1+ Reference range not established. /LPF     Hyaline Casts, Urine 2+ (A) NONE /LPF            Assessment/Plan   Danielle is a 73-year-old female patient presenting to emergency department for evaluation of shortness of breath and volume overload.  Patient found to have a BNP of 116, elevated troponin, elevated lactate, elevated WBCs.  Imaging showing moderate left pleural effusion with left basilar atelectasis and mild right basilar atelectasis.  Last echocardiogram on file 2/27/2023 with an EF of 60 to 65%.  Patient having new oxygen requirements moving from 2 L/min to 4 L/min.  Patient currently receiving IV Zosyn via PICC line at [facility] for recurrent UTI.  Patient found to be UTI  positive, no culture sent due to being on antibiotics chronically.  Patient given IV Lasix for volume overload in ED.  Consult placed to cardiology.  Admission for further medical management.     Acute decompensated heart failure  Admit to telemetry per Dr. Carcamo  Intake and output  See imaging results above  Daily weight  Lasix IV 40 mg in ED  Consult cardiology and appreciate input  Low-sodium diet  Fluid restriction  Chronic Cruz catheter     Diabetes/hypothyroidism/hypertension/A-fib/hyperlipidemia     Diabetic diet  ISS  Hypoglycemia protocol  Hold home oral antidiabetic medications when med rec is complete  Continue home medications  Continue home Eliquis when med rec is complete     DVT PPx  Continue home Eliquis when med rec is complete  SCDs  Activity as tolerated     I spent 30 minutes in the professional and overall care of this patient.  Patient fully evaluated on November, plan as above.                      Revision History          Patient fully evaluated on November 11.  Debrox for bilateral ear cerumen impactions.  Continue to diurese patient and recheck labs in AM.     Patient fully evaluated on November 12.  Continue aggressive diuresis.  BUN and creatinine are stable.  Recheck labs in AM.    Patient fully evaluated on November 13.  Rise in BUN and creatinine noted and patient will transition to oral diuretics.  Recheck labs in AM.  Recheck chemistries in a.m. to check for renal function  Patient fully evaluated on November 14 and rise in BUN and creatinine noted.  Diuretics have been held as well as ARB's.  Nephrology consultation obtained.  Recheck labs in a.m.    Patient fully evaluated on November 15.  Continue to monitor BUN and creatinine diuretics been held.    Patient fully evaluated on November 16.  Continue to monitor H&H which drop was noted.  Eliquis has been held.  Continued acute kidney injury noted and appreciate nephrology consultation.  Recheck labs in AM.  Thien Carcamo,  MD

## 2023-11-16 NOTE — CARE PLAN
The patient's goals for the shift include      The clinical goals for the shift include comfort and rest

## 2023-11-16 NOTE — CARE PLAN
Problem: Fall/Injury  Goal: Verbalize understanding of personal risk factors for fall in the hospital  Outcome: Progressing  Goal: Use assistive devices by end of the shift  Outcome: Progressing     Problem: Heart Failure  Goal: Improved urinary output this shift  Outcome: Progressing  Goal: Reduction in peripheral edema within 24 hours  Outcome: Progressing  Goal: Report improvement of dyspnea/breathlessness this shift  Outcome: Progressing  Goal: Weight from fluid excess reduced over 2-3 days, then stabilize  Outcome: Progressing  Goal: Increase self care and/or family involvement in 24 hours  Outcome: Progressing     Problem: Skin  Goal: Decreased wound size/increased tissue granulation at next dressing change  Outcome: Progressing  Goal: Participates in plan/prevention/treatment measures  Outcome: Progressing  Goal: Prevent/manage excess moisture  Outcome: Progressing  Goal: Prevent/minimize sheer/friction injuries  Outcome: Progressing  Goal: Promote/optimize nutrition  Outcome: Progressing  Goal: Promote skin healing  Outcome: Progressing     Problem: Pain - Adult  Goal: Verbalizes/displays adequate comfort level or baseline comfort level  Outcome: Progressing     Problem: Safety - Adult  Goal: Free from fall injury  Outcome: Progressing     Problem: Discharge Planning  Goal: Discharge to home or other facility with appropriate resources  Outcome: Progressing     Problem: Chronic Conditions and Co-morbidities  Goal: Patient's chronic conditions and co-morbidity symptoms are monitored and maintained or improved  Outcome: Progressing   The patient's goals for the shift include  rest and comfort     The clinical goals for the shift include pt. will have hgb greater than 7 throughout shift    Over the shift, the patient did not make progress toward the following goals. Barriers to progression include anemia. Recommendations to address these barriers include transfuse to maintain blood counts greater than 7.

## 2023-11-16 NOTE — PROGRESS NOTES
Cardiology Progress    Impression:  1.  HFpEF: Acute on chronic with predominant right-sided features.   Echocardiogram February 2023 showing normal EF, dilated RV with RV dysfunction, severe TR, RVSP 60.   2.  Permanent A-fib.  [id number]=4.  3.  Chronic lymphedema  4.  CONOR on CKD  5.  Anemia requiring transfusion  6.  Hyperlipidemia  7.  Diabetes  8.  Hypothyroidism  9.  Hypertension  10.  Urinary retention with chronic indwelling catheter with recurrent UTIs.  Plan:  Fluid management per nephrology.  Diuretics currently on hold for CONOR  Continue metoprolol for rate control  Eliquis held for drop in hemoglobin.  HPI:  Feels about the same.  No shortness of breath at rest.  Meds:  Scheduled medications  busPIRone, 5 mg, oral, BID  carbamide peroxide, 10 drop, Each Ear, BID  [Held by provider] dilTIAZem CD, 240 mg, oral, Daily  famotidine, 20 mg, oral, Daily  [Held by provider] furosemide, 40 mg, intravenous, q12h  gabapentin, 100 mg, oral, Daily  insulin lispro, 0-5 Units, subcutaneous, TID with meals  iron sucrose, 100 mg, intravenous, Daily  lactobacillus acidophilus, 1 tablet, oral, Daily  levothyroxine, 100 mcg, oral, Once per day on Mon  levothyroxine, 50 mcg, oral, Once per day on Sun Tue Wed Thu Fri Sat  metoprolol tartrate, 25 mg, oral, BID  piperacillin-tazobactam, 2.25 g, intravenous, q6h  polyethylene glycol, 17 g, oral, Daily  potassium chloride CR, 20 mEq, oral, Daily  predniSONE, 20 mg, oral, Daily  [Held by provider] torsemide, 20 mg, oral, Daily  triamcinolone, , Topical, BID      Continuous medications     PRN medications  PRN medications: acetaminophen, bisacodyl, dextrose 10 % in water (D10W), dextrose, glucagon, LORazepam, nystatin, oxygen, traMADol, zinc oxide    Physical exam:  Vitals:    11/16/23 0841   BP: 99/67   Pulse: 89   Resp: 16   Temp: 35.2 °C (95.4 °F)   SpO2: 99%      JVP not visible.  Poor air entry bilaterally.  Grossly edematous.  EKG:  Telemetry shows A-fib with adequate rate  control  Echo:  February 2023 showing normal EF, dilated RV with RV dysfunction, severe TR, RVSP 60    Labs:  Lab Results   Component Value Date    WBC 13.2 (H) 11/15/2023    HGB 6.8 (L) 11/15/2023    HCT 25.0 (L) 11/15/2023     11/15/2023    CHOL 180 11/15/2023    TRIG 128 11/15/2023    HDL 58.7 11/15/2023    ALT 18 11/15/2023    AST 6 (L) 11/15/2023     (L) 11/16/2023    K 5.8 (H) 11/16/2023    CL 96 (L) 11/16/2023    CREATININE 3.62 (H) 11/16/2023    BUN 81 (H) 11/16/2023    CO2 29 11/16/2023    TSH 1.27 02/27/2023    INR 1.0 11/09/2023    HGBA1C 6.5 (A) 02/14/2023     par

## 2023-11-16 NOTE — PROGRESS NOTES
"Danielle Pairsi is a 73 y.o. female on day 6 of admission presenting with Acute decompensated heart failure (CMS/HCC).    Subjective   Today patient main complaint is pain in the lateral aspect of the left leg  Positive Hemoccult stools  Have elevation of the serum myoglobin  Phosphorus today elevated at 9.8  Potassium elevated at 5.8  BUN/creatinine 81/3.62 respectively with a GFR of 13  Blood pressure is stable     Lasix x-ray showed near complete opacification of the left hemithorax with possible consolidation patient currently is on Zosyn.  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  Pupils react to light and accommodation  Neck; no hepatojugular reflux no jugular venous distention no bruit no thyromegaly no lymphadenopathy  Lungs she had crackles on inspiration left more than right with diminished air entry in the left side no wheezing  Heart; irregular rate there is no gallop no rubs  Abdomen; active peristalsis no rebound or guarding abdomen is obese no fluid wave  Extremities; severe lymphedema progressing (anasarca with ulceration heal in the right leg;  There is erythema and tenderness in the lateral aspect of the left leg suggesting cellulitis  Pulses are decreased because of the edema of the legs    Last Recorded Vitals  Blood pressure 103/65, pulse 91, temperature 35.3 °C (95.5 °F), resp. rate 20, height 1.702 m (5' 7\"), weight (!) 168 kg (370 lb 6 oz), SpO2 98 %.  Intake/Output last 3 Shifts:  I/O last 3 completed shifts:  In: 1590 (10.6 mL/kg) [P.O.:340; Blood:450; IV Piggyback:800]  Out: 550 (3.7 mL/kg) [Urine:550 (0.1 mL/kg/hr)]  Dosing Weight: 150 kg     Relevant Results  Scheduled medications  busPIRone, 5 mg, oral, BID  carbamide peroxide, 10 drop, " Each Ear, BID  [Held by provider] dilTIAZem CD, 240 mg, oral, Daily  famotidine, 20 mg, oral, Daily  [Held by provider] furosemide, 40 mg, intravenous, q12h  gabapentin, 100 mg, oral, Daily  insulin lispro, 0-5 Units, subcutaneous, TID with meals  iron sucrose, 100 mg, intravenous, Daily  lactobacillus acidophilus, 1 tablet, oral, Daily  levothyroxine, 100 mcg, oral, Once per day on Mon  levothyroxine, 50 mcg, oral, Once per day on Sun Tue Wed Thu Fri Sat  metoprolol tartrate, 25 mg, oral, BID  piperacillin-tazobactam, 2.25 g, intravenous, q6h  polyethylene glycol, 17 g, oral, Daily  potassium chloride CR, 20 mEq, oral, Daily  predniSONE, 20 mg, oral, Daily  [Held by provider] torsemide, 20 mg, oral, Daily  triamcinolone, , Topical, BID      Continuous medications     PRN medications  PRN medications: acetaminophen, bisacodyl, dextrose 10 % in water (D10W), dextrose, glucagon, LORazepam, nystatin, oxygen, sodium zirconium cyclosilicate, traMADol, zinc oxide   Results for orders placed or performed during the hospital encounter of 11/09/23 (from the past 24 hour(s))   Urinalysis with Reflex Microscopic   Result Value Ref Range    Color, Urine Radha (N) Straw, Yellow    Appearance, Urine Hazy (N) Clear    Specific Gravity, Urine 1.021 1.005 - 1.035    pH, Urine 5.0 5.0, 5.5, 6.0, 6.5, 7.0, 7.5, 8.0    Protein, Urine 100 (2+) (N) NEGATIVE mg/dL    Glucose, Urine NEGATIVE NEGATIVE mg/dL    Blood, Urine LARGE (3+) (A) NEGATIVE    Ketones, Urine NEGATIVE NEGATIVE mg/dL    Bilirubin, Urine NEGATIVE NEGATIVE    Urobilinogen, Urine <2.0 <2.0 mg/dL    Nitrite, Urine NEGATIVE NEGATIVE    Leukocyte Esterase, Urine LARGE (3+) (A) NEGATIVE   Microscopic Only, Urine   Result Value Ref Range    WBC, Urine >50 (A) 1-5, NONE /HPF    WBC Clumps, Urine MANY Reference range not established. /HPF    RBC, Urine >20 (A) NONE, 1-2, 3-5 /HPF    Squamous Epithelial Cells, Urine 1-9 (SPARSE) Reference range not established. /HPF    Bacteria,  Urine 1+ (A) NONE SEEN /HPF    Budding Yeast, Urine PRESENT (A) NONE /HPF   Renal function panel   Result Value Ref Range    Glucose 313 (H) 74 - 99 mg/dL    Sodium 138 136 - 145 mmol/L    Potassium 5.4 (H) 3.5 - 5.3 mmol/L    Chloride 99 98 - 107 mmol/L    Bicarbonate 30 21 - 32 mmol/L    Anion Gap 14 10 - 20 mmol/L    Urea Nitrogen 80 (H) 6 - 23 mg/dL    Creatinine 3.67 (H) 0.50 - 1.05 mg/dL    eGFR 13 (L) >60 mL/min/1.73m*2    Calcium 7.5 (L) 8.6 - 10.3 mg/dL    Phosphorus 10.2 (H) 2.5 - 4.9 mg/dL    Albumin 2.9 (L) 3.4 - 5.0 g/dL   POCT GLUCOSE   Result Value Ref Range    POCT Glucose 311 (H) 74 - 99 mg/dL   Prepare RBC: 1 Units   Result Value Ref Range    PRODUCT CODE G2936U12     Unit Number [id number]     Unit ABO O     Unit RH POS     XM INTEP COMP     Dispense Status TR     Blood Expiration Date December 13, 2023 23:59 EST     PRODUCT BLOOD TYPE 5100     UNIT VOLUME 350    POCT GLUCOSE   Result Value Ref Range    POCT Glucose 301 (H) 74 - 99 mg/dL   Type and screen   Result Value Ref Range    ABO TYPE O     Rh TYPE POS     ANTIBODY SCREEN NEG    Renal function panel   Result Value Ref Range    Glucose 352 (H) 74 - 99 mg/dL    Sodium 134 (L) 136 - 145 mmol/L    Potassium 5.8 (H) 3.5 - 5.3 mmol/L    Chloride 96 (L) 98 - 107 mmol/L    Bicarbonate 29 21 - 32 mmol/L    Anion Gap 15 10 - 20 mmol/L    Urea Nitrogen 81 (H) 6 - 23 mg/dL    Creatinine 3.62 (H) 0.50 - 1.05 mg/dL    eGFR 13 (L) >60 mL/min/1.73m*2    Calcium 7.5 (L) 8.6 - 10.3 mg/dL    Phosphorus 9.8 (H) 2.5 - 4.9 mg/dL    Albumin 3.4 3.4 - 5.0 g/dL   Occult Blood, Stool    Specimen: Stool   Result Value Ref Range    Occult Blood, Stool X1 Positive (A) Negative   POCT GLUCOSE   Result Value Ref Range    POCT Glucose 357 (H) 74 - 99 mg/dL   CBC   Result Value Ref Range    WBC 13.0 (H) 4.4 - 11.3 x10*3/uL    nRBC 1.7 (H) 0.0 - 0.0 /100 WBCs    RBC 2.70 (L) 4.00 - 5.20 x10*6/uL    Hemoglobin 7.2 (L) 12.0 - 16.0 g/dL    Hematocrit 25.1 (L) 36.0 - 46.0  %    MCV 93 80 - 100 fL    MCH 26.7 26.0 - 34.0 pg    MCHC 28.7 (L) 32.0 - 36.0 g/dL    RDW 17.7 (H) 11.5 - 14.5 %    Platelets 206 150 - 450 x10*3/uL   POCT GLUCOSE   Result Value Ref Range    POCT Glucose 375 (H) 74 - 99 mg/dL       XR chest 1 view    Result Date: 11/15/2023  Interpreted By:  Johann Wood, STUDY: XR CHEST 1 VIEW;  11/14/2023 8:44 pm   INDICATION: Signs/Symptoms:Atelectasis.   COMPARISON: 11/09/2023   ACCESSION NUMBER(S): [id number]   ORDERING CLINICIAN: LINH RONQUILLO   FINDINGS: There is near-complete opacification of the left hemithorax with several air bronchograms centrally, and only aeration remaining at the apex. This has progressed from the prior exam and most likely due to an effusion with underlying consolidation. There is volume loss with shift of the heart mediastinum to the left. There is also blunting at the right cardio hepatic angle since the prior exam indicating an additional small right effusion.   ABDOMEN AND OTHER FINDINGS: No remarkable upper abdominal findings.   BONES: No acute osseous changes.       1.  Airspace disease as described.   Signed by: Johann Wood 11/15/2023 9:33 AM Dictation workstation:   [id number]    US renal complete    Result Date: 11/15/2023  Interpreted By:  Johann Wood, STUDY: US RENAL COMPLETE;  11/14/2023 8:42 pm   INDICATION: Signs/Symptoms:Renal failure.   COMPARISON: None.   ACCESSION NUMBER(S): [id number]   ORDERING CLINICIAN: LINH RONQUILLO   TECHNIQUE: Multiple images of the kidneys were obtained  , with color Doppler for blood flow.  It is noted the exam was limited due to patient habitus , limited mobility and continuous respiration.   FINDINGS: RIGHT KIDNEY: The right kidney measures 9.6 cm in length.  There is mild thinning of the renal cortex at the lower pole indicating mild atrophy. The remaining cortex appears maintained in homogeneous..  No hydronephrosis or evidence of nephrolithiasis. Color Doppler demonstrates renal blood  flow.   LEFT KIDNEY: The left kidney could not be visualized, presumably secondary to limiting factors as above.   BLADDER: Not visualized, also probably due to limiting factors and under distension.   OTHER FINDINGS: None significant.       Mild right renal cortical atrophy at the lower pole, otherwise no hydronephrosis. The left kidney and bladder were not visualized.   Signed by: Johann Wood 11/15/2023 8:39 AM Dictation workstation:   [id number]    XR chest 1 view    Result Date: 11/10/2023  STUDY: Chest Radiograph;  11/09/2023 10:56 PM INDICATION: Shortness of breath. COMPARISON: XR chest 03/16/2023, 03/15/2023.  ACCESSION NUMBER(S): [id number] ORDERING CLINICIAN: SACHIN WELDON TECHNIQUE:  Frontal chest was obtained at 2229 hours. FINDINGS: CARDIOMEDIASTINAL SILHOUETTE: Cardiomediastinal silhouette is markedly enlarged and stable in size and configuration.  LUNGS: Left-sided PICC line is noted terminating in the region of the proximal SVC/left subclavian junction.  Mild right basilar atelectasis.  Moderate left pleural effusion is suggested with underlying left basilar atelectasis and/or consolidation.  ABDOMEN: No remarkable upper abdominal findings.  BONES: No acute osseous changes.    Moderate left pleural effusion with left basilar atelectasis and/or consolidation.  Mild right basilar atelectasis. Signed by Nakul Rucker MD                            Assessment/Plan   #1 acute kidney injury superimposed on chronic kidney disease  #2 eosinophiluria suggesting allergic interstitial nephritis  #3 acute hyperkalemia  #4 GI bleed  #5 anasarca  #6 secondary hyperparathyroidism of renal origin with hyperphosphatemia ( PTH was 329)  #7 acute rhabdomyolysis  #8 pyocystitis  #9 atrial fibrillation  #10 hyperuricemia  #11 nonnephrotic range proteinuria  #12 cellulitis of left leg   Plan  Patient is already taking p.o. steroids  Topical steroids to the left leg  Consider IV fluids with a bicarb drip at low  rate  Trend intake and output  Trend renal chemistries and if there is no improvement in the next 48 hours patient will need renal replacement therapy  I spent  60 minutes in the professional and overall care of this patient.    Thank you very much for allowing to participate in the care of this patient  Troy Patricio MD

## 2023-11-16 NOTE — CARE PLAN
Problem: Fall/Injury  Goal: Verbalize understanding of personal risk factors for fall in the hospital  11/15/2023 2303 by Alison Tuttle RN  Outcome: Progressing  11/15/2023 2252 by Alison Tutlte RN  Outcome: Progressing  Goal: Verbalize understanding of risk factor reduction measures to prevent injury from fall in the home  Outcome: Progressing  Goal: Use assistive devices by end of the shift  Outcome: Progressing  Goal: Pace activities to prevent fatigue by end of the shift  Outcome: Progressing     Problem: Heart Failure  Goal: Improved urinary output this shift  11/15/2023 2303 by Alison Tuttle RN  Outcome: Progressing  11/15/2023 2252 by Alison Tuttle RN  Outcome: Progressing  Goal: Reduction in peripheral edema within 24 hours  11/15/2023 2303 by Alison Tuttle RN  Outcome: Progressing  11/15/2023 2252 by Alison Tuttle RN  Outcome: Progressing  Goal: Report improvement of dyspnea/breathlessness this shift  Outcome: Progressing  Goal: Weight from fluid excess reduced over 2-3 days, then stabilize  Outcome: Progressing  Goal: Increase self care and/or family involvement in 24 hours  Outcome: Progressing     Problem: Skin  Goal: Decreased wound size/increased tissue granulation at next dressing change  11/15/2023 2303 by Alison Tuttle RN  Outcome: Progressing  11/15/2023 2252 by Alison Tuttle RN  Outcome: Progressing  Goal: Participates in plan/prevention/treatment measures  11/15/2023 2303 by Alison Tuttle RN  Outcome: Progressing  11/15/2023 2252 by Alison Tuttle RN  Outcome: Progressing  Goal: Prevent/manage excess moisture  11/15/2023 2303 by Alison Tuttle RN  Outcome: Progressing  Flowsheets (Taken 11/15/2023 2303)  Prevent/manage excess moisture:   Cleanse incontinence/protect with barrier cream   Moisturize dry skin  11/15/2023 2252 by Alison Tuttle RN  Outcome: Progressing  Goal: Prevent/minimize sheer/friction injuries  11/15/2023 2303 by Alison Tuttle RN  Outcome: Progressing  11/15/2023 2252 by Alison  LORY Tuttle  Outcome: Progressing  Goal: Promote/optimize nutrition  11/15/2023 2303 by Alison Tuttle RN  Outcome: Progressing  Flowsheets (Taken 11/15/2023 2303)  Promote/optimize nutrition:   Monitor/record intake including meals   Assist with feeding  11/15/2023 2252 by Alison Tuttle RN  Outcome: Progressing  Goal: Promote skin healing  11/15/2023 2303 by Alison Tuttle RN  Outcome: Progressing  Flowsheets (Taken 11/15/2023 2303)  Promote skin healing: Turn/reposition every 2 hours/use positioning/transfer devices  11/15/2023 2252 by Alison Tuttle RN  Outcome: Progressing     Problem: Pain - Adult  Goal: Verbalizes/displays adequate comfort level or baseline comfort level  Outcome: Progressing     Problem: Safety - Adult  Goal: Free from fall injury  Outcome: Progressing     Problem: Discharge Planning  Goal: Discharge to home or other facility with appropriate resources  Outcome: Progressing     Problem: Chronic Conditions and Co-morbidities  Goal: Patient's chronic conditions and co-morbidity symptoms are monitored and maintained or improved  Outcome: Progressing

## 2023-11-17 LAB
ALBUMIN SERPL BCP-MCNC: 3.3 G/DL (ref 3.4–5)
ALP SERPL-CCNC: 90 U/L (ref 33–136)
ALT SERPL W P-5'-P-CCNC: 22 U/L (ref 7–45)
ANION GAP SERPL CALC-SCNC: 16 MMOL/L (ref 10–20)
AST SERPL W P-5'-P-CCNC: 7 U/L (ref 9–39)
BILIRUB SERPL-MCNC: 0.3 MG/DL (ref 0–1.2)
BUN SERPL-MCNC: 90 MG/DL (ref 6–23)
CALCIUM SERPL-MCNC: 7.6 MG/DL (ref 8.6–10.3)
CHLORIDE SERPL-SCNC: 94 MMOL/L (ref 98–107)
CO2 SERPL-SCNC: 29 MMOL/L (ref 21–32)
CREAT SERPL-MCNC: 3.99 MG/DL (ref 0.5–1.05)
ERYTHROCYTE [DISTWIDTH] IN BLOOD BY AUTOMATED COUNT: 18.2 % (ref 11.5–14.5)
GFR SERPL CREATININE-BSD FRML MDRD: 11 ML/MIN/1.73M*2
GLUCOSE BLD MANUAL STRIP-MCNC: 323 MG/DL (ref 74–99)
GLUCOSE BLD MANUAL STRIP-MCNC: 325 MG/DL (ref 74–99)
GLUCOSE BLD MANUAL STRIP-MCNC: 350 MG/DL (ref 74–99)
GLUCOSE BLD MANUAL STRIP-MCNC: 428 MG/DL (ref 74–99)
GLUCOSE SERPL-MCNC: 399 MG/DL (ref 74–99)
HCT VFR BLD AUTO: 25 % (ref 36–46)
HGB BLD-MCNC: 7.2 G/DL (ref 12–16)
MCH RBC QN AUTO: 26.7 PG (ref 26–34)
MCHC RBC AUTO-ENTMCNC: 28.8 G/DL (ref 32–36)
MCV RBC AUTO: 93 FL (ref 80–100)
NRBC BLD-RTO: 1.1 /100 WBCS (ref 0–0)
PLATELET # BLD AUTO: 191 X10*3/UL (ref 150–450)
POTASSIUM SERPL-SCNC: 5.6 MMOL/L (ref 3.5–5.3)
PROT SERPL-MCNC: 5.2 G/DL (ref 6.4–8.2)
RBC # BLD AUTO: 2.7 X10*6/UL (ref 4–5.2)
SODIUM SERPL-SCNC: 133 MMOL/L (ref 136–145)
WBC # BLD AUTO: 15.8 X10*3/UL (ref 4.4–11.3)

## 2023-11-17 PROCEDURE — 2500000004 HC RX 250 GENERAL PHARMACY W/ HCPCS (ALT 636 FOR OP/ED): Performed by: NURSE PRACTITIONER

## 2023-11-17 PROCEDURE — 82947 ASSAY GLUCOSE BLOOD QUANT: CPT | Mod: 59

## 2023-11-17 PROCEDURE — 80053 COMPREHEN METABOLIC PANEL: CPT | Performed by: INTERNAL MEDICINE

## 2023-11-17 PROCEDURE — 2500000002 HC RX 250 W HCPCS SELF ADMINISTERED DRUGS (ALT 637 FOR MEDICARE OP, ALT 636 FOR OP/ED): Performed by: NURSE PRACTITIONER

## 2023-11-17 PROCEDURE — 2060000001 HC INTERMEDIATE ICU ROOM DAILY

## 2023-11-17 PROCEDURE — 2500000001 HC RX 250 WO HCPCS SELF ADMINISTERED DRUGS (ALT 637 FOR MEDICARE OP): Performed by: INTERNAL MEDICINE

## 2023-11-17 PROCEDURE — 2500000002 HC RX 250 W HCPCS SELF ADMINISTERED DRUGS (ALT 637 FOR MEDICARE OP, ALT 636 FOR OP/ED): Performed by: INTERNAL MEDICINE

## 2023-11-17 PROCEDURE — 85027 COMPLETE CBC AUTOMATED: CPT | Performed by: INTERNAL MEDICINE

## 2023-11-17 PROCEDURE — 2500000001 HC RX 250 WO HCPCS SELF ADMINISTERED DRUGS (ALT 637 FOR MEDICARE OP): Performed by: NURSE PRACTITIONER

## 2023-11-17 PROCEDURE — 2500000004 HC RX 250 GENERAL PHARMACY W/ HCPCS (ALT 636 FOR OP/ED): Performed by: INTERNAL MEDICINE

## 2023-11-17 RX ORDER — INSULIN LISPRO 100 [IU]/ML
4 INJECTION, SOLUTION INTRAVENOUS; SUBCUTANEOUS ONCE
Status: COMPLETED | OUTPATIENT
Start: 2023-11-17 | End: 2023-11-17

## 2023-11-17 RX ADMIN — Medication 1 TABLET: at 09:54

## 2023-11-17 RX ADMIN — TRIAMCINOLONE ACETONIDE: 1 OINTMENT TOPICAL at 09:54

## 2023-11-17 RX ADMIN — TRAMADOL HYDROCHLORIDE 50 MG: 50 TABLET, COATED ORAL at 23:05

## 2023-11-17 RX ADMIN — Medication 10 DROP: at 21:02

## 2023-11-17 RX ADMIN — INSULIN LISPRO 5 UNITS: 100 INJECTION, SOLUTION INTRAVENOUS; SUBCUTANEOUS at 13:35

## 2023-11-17 RX ADMIN — IRON SUCROSE 100 MG: 20 INJECTION, SOLUTION INTRAVENOUS at 09:53

## 2023-11-17 RX ADMIN — FUROSEMIDE 20 MG/HR: 10 INJECTION, SOLUTION INTRAMUSCULAR; INTRAVENOUS at 18:51

## 2023-11-17 RX ADMIN — GABAPENTIN 100 MG: 100 CAPSULE ORAL at 09:54

## 2023-11-17 RX ADMIN — INSULIN LISPRO 4 UNITS: 100 INJECTION, SOLUTION INTRAVENOUS; SUBCUTANEOUS at 09:55

## 2023-11-17 RX ADMIN — BUSPIRONE HYDROCHLORIDE 5 MG: 5 TABLET ORAL at 21:01

## 2023-11-17 RX ADMIN — PIPERACILLIN SODIUM AND TAZOBACTAM SODIUM 2.25 G: 2; .25 INJECTION, SOLUTION INTRAVENOUS at 09:53

## 2023-11-17 RX ADMIN — BUSPIRONE HYDROCHLORIDE 5 MG: 5 TABLET ORAL at 09:54

## 2023-11-17 RX ADMIN — PREDNISONE 20 MG: 20 TABLET ORAL at 09:54

## 2023-11-17 RX ADMIN — PIPERACILLIN SODIUM AND TAZOBACTAM SODIUM 2.25 G: 2; .25 INJECTION, SOLUTION INTRAVENOUS at 15:11

## 2023-11-17 RX ADMIN — INSULIN LISPRO 4 UNITS: 100 INJECTION, SOLUTION INTRAVENOUS; SUBCUTANEOUS at 17:12

## 2023-11-17 RX ADMIN — FAMOTIDINE 20 MG: 20 TABLET ORAL at 09:54

## 2023-11-17 RX ADMIN — PIPERACILLIN SODIUM AND TAZOBACTAM SODIUM 2.25 G: 2; .25 INJECTION, SOLUTION INTRAVENOUS at 21:01

## 2023-11-17 RX ADMIN — LEVOTHYROXINE SODIUM 50 MCG: 100 TABLET ORAL at 05:12

## 2023-11-17 RX ADMIN — TRIAMCINOLONE ACETONIDE: 1 OINTMENT TOPICAL at 21:00

## 2023-11-17 RX ADMIN — PIPERACILLIN SODIUM AND TAZOBACTAM SODIUM 2.25 G: 2; .25 INJECTION, SOLUTION INTRAVENOUS at 01:02

## 2023-11-17 RX ADMIN — INSULIN LISPRO 4 UNITS: 100 INJECTION, SOLUTION INTRAVENOUS; SUBCUTANEOUS at 13:34

## 2023-11-17 ASSESSMENT — COGNITIVE AND FUNCTIONAL STATUS - GENERAL
DAILY ACTIVITIY SCORE: 8
MOVING TO AND FROM BED TO CHAIR: TOTAL
EATING MEALS: A LOT
CLIMB 3 TO 5 STEPS WITH RAILING: TOTAL
DRESSING REGULAR LOWER BODY CLOTHING: TOTAL
DRESSING REGULAR UPPER BODY CLOTHING: TOTAL
STANDING UP FROM CHAIR USING ARMS: TOTAL
TURNING FROM BACK TO SIDE WHILE IN FLAT BAD: TOTAL
DAILY ACTIVITIY SCORE: 7
PERSONAL GROOMING: TOTAL
MOVING TO AND FROM BED TO CHAIR: TOTAL
TURNING FROM BACK TO SIDE WHILE IN FLAT BAD: TOTAL
HELP NEEDED FOR BATHING: TOTAL
DRESSING REGULAR UPPER BODY CLOTHING: TOTAL
MOBILITY SCORE: 6
MOVING FROM LYING ON BACK TO SITTING ON SIDE OF FLAT BED WITH BEDRAILS: TOTAL
WALKING IN HOSPITAL ROOM: TOTAL
MOBILITY SCORE: 6
MOVING FROM LYING ON BACK TO SITTING ON SIDE OF FLAT BED WITH BEDRAILS: TOTAL
DRESSING REGULAR LOWER BODY CLOTHING: TOTAL
WALKING IN HOSPITAL ROOM: TOTAL
HELP NEEDED FOR BATHING: TOTAL
TOILETING: TOTAL
TOILETING: TOTAL
PERSONAL GROOMING: TOTAL
STANDING UP FROM CHAIR USING ARMS: TOTAL
EATING MEALS: A LITTLE
CLIMB 3 TO 5 STEPS WITH RAILING: TOTAL

## 2023-11-17 ASSESSMENT — PAIN SCALES - PAIN ASSESSMENT IN ADVANCED DEMENTIA (PAINAD)
TOTALSCORE: 7
BODYLANGUAGE: TENSE, DISTRESSED PACING, FIDGETING
BREATHING: OCCASIONAL LABORED BREATHING, SHORT PERIOD OF HYPERVENTILATION
BREATHING: NORMAL
FACIALEXPRESSION: SAD, FRIGHTENED, FROWN
TOTALSCORE: 0
CONSOLABILITY: UNABLE TO CONSOLE, DISTRACT OR REASSURE
NEGVOCALIZATION: REPEATED TROUBLED CALLING OUT, LOUD MOANING/GROANING, CRYING
TOTALSCORE: MEDICATION (SEE MAR);REPOSITIONED
FACIALEXPRESSION: SMILING OR INEXPRESSIVE
CONSOLABILITY: NO NEED TO CONSOLE
TOTALSCORE: REPOSITIONED
BODYLANGUAGE: RELAXED

## 2023-11-17 ASSESSMENT — PAIN - FUNCTIONAL ASSESSMENT
PAIN_FUNCTIONAL_ASSESSMENT: PAINAD (PAIN ASSESSMENT IN ADVANCED DEMENTIA SCALE)
PAIN_FUNCTIONAL_ASSESSMENT: PAINAD (PAIN ASSESSMENT IN ADVANCED DEMENTIA SCALE)

## 2023-11-17 NOTE — PROGRESS NOTES
"Danielle Parisi is a 73 y.o. female on day 7 of admission presenting with Acute decompensated heart failure (CMS/HCC).    Subjective   Patient continued to have oligoanuria her renal chemistries continue to deteriorate and patient is getting mild uremic encephalopathy with some hallucinations with progressive edema.  Patient continued to have worsening numbers including anemia secondary to renal disease, hemoglobin today 7.2, BUN and creatinine 90 and 3.99 respectively.  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  Pupils react to light and accommodation  Neck; no hepatojugular reflux no jugular venous distention no bruit no thyromegaly no lymphadenopathy  Lungs she had crackles on inspiration left more than right with diminished air entry in the left side no wheezing  Heart; irregular rate there is no gallop no rubs  Abdomen; active peristalsis no rebound or guarding abdomen is obese no fluid wave  Extremities; severe lymphedema progressing (anasarca with ulceration heal in the right leg;  There is erythema and tenderness in the lateral aspect of the left leg suggesting cellulitis  Pulses are decreased because of the edema of the legs    Last Recorded Vitals  Blood pressure 86/61, pulse 84, temperature 35.4 °C (95.7 °F), resp. rate 22, height 1.702 m (5' 7\"), weight (!) 168 kg (370 lb 6 oz), SpO2 97 %.  Intake/Output last 3 Shifts:  I/O last 3 completed shifts:  In: 720 (4.8 mL/kg) [P.O.:220; Blood:350; IV Piggyback:150]  Out: 500 (3.3 mL/kg) [Urine:500 (0.1 mL/kg/hr)]  Dosing Weight: 150 kg     Relevant Results  Scheduled medications  busPIRone, 5 mg, oral, BID  carbamide peroxide, 10 drop, Each Ear, BID  [Held by provider] " dilTIAZem CD, 240 mg, oral, Daily  famotidine, 20 mg, oral, Daily  [Held by provider] furosemide, 40 mg, intravenous, q12h  gabapentin, 100 mg, oral, Daily  insulin lispro, 0-5 Units, subcutaneous, TID with meals  iron sucrose, 100 mg, intravenous, Daily  lactobacillus acidophilus, 1 tablet, oral, Daily  levothyroxine, 100 mcg, oral, Once per day on Mon  levothyroxine, 50 mcg, oral, Once per day on Sun Tue Wed Thu Fri Sat  metoprolol tartrate, 25 mg, oral, BID  piperacillin-tazobactam, 2.25 g, intravenous, q6h  polyethylene glycol, 17 g, oral, Daily  potassium chloride CR, 20 mEq, oral, Daily  predniSONE, 20 mg, oral, Daily  [Held by provider] torsemide, 20 mg, oral, Daily  triamcinolone, , Topical, BID      Continuous medications  furosemide, 20 mg/hr    PRN medications  PRN medications: acetaminophen, bisacodyl, dextrose 10 % in water (D10W), dextrose, glucagon, LORazepam, nystatin, oxygen, sodium zirconium cyclosilicate, traMADol, zinc oxide   Results for orders placed or performed during the hospital encounter of 11/09/23 (from the past 24 hour(s))   POCT GLUCOSE   Result Value Ref Range    POCT Glucose 269 (H) 74 - 99 mg/dL   POCT GLUCOSE   Result Value Ref Range    POCT Glucose 308 (H) 74 - 99 mg/dL   CBC   Result Value Ref Range    WBC 15.8 (H) 4.4 - 11.3 x10*3/uL    nRBC 1.1 (H) 0.0 - 0.0 /100 WBCs    RBC 2.70 (L) 4.00 - 5.20 x10*6/uL    Hemoglobin 7.2 (L) 12.0 - 16.0 g/dL    Hematocrit 25.0 (L) 36.0 - 46.0 %    MCV 93 80 - 100 fL    MCH 26.7 26.0 - 34.0 pg    MCHC 28.8 (L) 32.0 - 36.0 g/dL    RDW 18.2 (H) 11.5 - 14.5 %    Platelets 191 150 - 450 x10*3/uL   Comprehensive Metabolic Panel   Result Value Ref Range    Glucose 399 (H) 74 - 99 mg/dL    Sodium 133 (L) 136 - 145 mmol/L    Potassium 5.6 (H) 3.5 - 5.3 mmol/L    Chloride 94 (L) 98 - 107 mmol/L    Bicarbonate 29 21 - 32 mmol/L    Anion Gap 16 10 - 20 mmol/L    Urea Nitrogen 90 (H) 6 - 23 mg/dL    Creatinine 3.99 (H) 0.50 - 1.05 mg/dL    eGFR 11 (L) >60  mL/min/1.73m*2    Calcium 7.6 (L) 8.6 - 10.3 mg/dL    Albumin 3.3 (L) 3.4 - 5.0 g/dL    Alkaline Phosphatase 90 33 - 136 U/L    Total Protein 5.2 (L) 6.4 - 8.2 g/dL    AST 7 (L) 9 - 39 U/L    Bilirubin, Total 0.3 0.0 - 1.2 mg/dL    ALT 22 7 - 45 U/L   POCT GLUCOSE   Result Value Ref Range    POCT Glucose 325 (H) 74 - 99 mg/dL   POCT GLUCOSE   Result Value Ref Range    POCT Glucose 428 (H) 74 - 99 mg/dL   POCT GLUCOSE   Result Value Ref Range    POCT Glucose 350 (H) 74 - 99 mg/dL       XR chest 1 view    Result Date: 11/15/2023  Interpreted By:  Johann Wood, STUDY: XR CHEST 1 VIEW;  11/14/2023 8:44 pm   INDICATION: Signs/Symptoms:Atelectasis.   COMPARISON: 11/09/2023   ACCESSION NUMBER(S): [id number]   ORDERING CLINICIAN: LINH RONQUILLO   FINDINGS: There is near-complete opacification of the left hemithorax with several air bronchograms centrally, and only aeration remaining at the apex. This has progressed from the prior exam and most likely due to an effusion with underlying consolidation. There is volume loss with shift of the heart mediastinum to the left. There is also blunting at the right cardio hepatic angle since the prior exam indicating an additional small right effusion.   ABDOMEN AND OTHER FINDINGS: No remarkable upper abdominal findings.   BONES: No acute osseous changes.       1.  Airspace disease as described.   Signed by: Johann Wood 11/15/2023 9:33 AM Dictation workstation:   [id number]    US renal complete    Result Date: 11/15/2023  Interpreted By:  Johnan Wood, STUDY: US RENAL COMPLETE;  11/14/2023 8:42 pm   INDICATION: Signs/Symptoms:Renal failure.   COMPARISON: None.   ACCESSION NUMBER(S): [id number]   ORDERING CLINICIAN: LINH RONQUILLO   TECHNIQUE: Multiple images of the kidneys were obtained  , with color Doppler for blood flow.  It is noted the exam was limited due to patient habitus , limited mobility and continuous respiration.   FINDINGS: RIGHT KIDNEY: The right kidney  measures 9.6 cm in length.  There is mild thinning of the renal cortex at the lower pole indicating mild atrophy. The remaining cortex appears maintained in homogeneous..  No hydronephrosis or evidence of nephrolithiasis. Color Doppler demonstrates renal blood flow.   LEFT KIDNEY: The left kidney could not be visualized, presumably secondary to limiting factors as above.   BLADDER: Not visualized, also probably due to limiting factors and under distension.   OTHER FINDINGS: None significant.       Mild right renal cortical atrophy at the lower pole, otherwise no hydronephrosis. The left kidney and bladder were not visualized.   Signed by: Johann Wood 11/15/2023 8:39 AM Dictation workstation:   [id number]    XR chest 1 view    Result Date: 11/10/2023  STUDY: Chest Radiograph;  11/09/2023 10:56 PM INDICATION: Shortness of breath. COMPARISON: XR chest 03/16/2023, 03/15/2023.  ACCESSION NUMBER(S): [id number] ORDERING CLINICIAN: SACHIN WELDON TECHNIQUE:  Frontal chest was obtained at 2229 hours. FINDINGS: CARDIOMEDIASTINAL SILHOUETTE: Cardiomediastinal silhouette is markedly enlarged and stable in size and configuration.  LUNGS: Left-sided PICC line is noted terminating in the region of the proximal SVC/left subclavian junction.  Mild right basilar atelectasis.  Moderate left pleural effusion is suggested with underlying left basilar atelectasis and/or consolidation.  ABDOMEN: No remarkable upper abdominal findings.  BONES: No acute osseous changes.    Moderate left pleural effusion with left basilar atelectasis and/or consolidation.  Mild right basilar atelectasis. Signed by Nakul Rucker MD                            Assessment/Plan   #1 acute kidney injury superimposed on chronic kidney disease  #2 eosinophiluria suggesting allergic interstitial nephritis  #3 acute hyperkalemia  #4 GI bleed  #5 anasarca  #6 secondary hyperparathyroidism of renal origin with hyperphosphatemia ( PTH was 329)  #7 acute  rhabdomyolysis  #8 pyocystitis  #9 atrial fibrillation  #10 hyperuricemia  #11 nonnephrotic range proteinuria  #12 cellulitis of left leg  #13 uremic encephalopathy   Plan  Furosemide drip if no response with improvement on renal chemistries patient will need renal replacement therapy  I spent  60 minutes in the professional and overall care of this patient.    Thank you very much for allowing to participate in the care of this patient  Troy Patricio MD

## 2023-11-17 NOTE — PROGRESS NOTES
Danielle Parisi is a 73 y.o. female on day 7 of admission presenting with Acute decompensated heart failure (CMS/HCC).      Subjective   Patient fully evaluated on November 11 and clinically improving.       Objective     Last Recorded Vitals  BP 86/61   Pulse 84   Temp 35.4 °C (95.7 °F)   Resp 22   Wt (!) 168 kg (370 lb 6 oz)   SpO2 97%   Intake/Output last 3 Shifts:    Intake/Output Summary (Last 24 hours) at 11/17/2023 1740  Last data filed at 11/17/2023 1615  Gross per 24 hour   Intake 500 ml   Output 200 ml   Net 300 ml         Admission Weight  Weight: 150 kg (330 lb) (11/09/23 2215)    Daily Weight  11/10/23 : (!) 168 kg (370 lb 6 oz)    Image Results  XR chest 1 view  Narrative: Interpreted By:  Johann Wood,   STUDY:  XR CHEST 1 VIEW;  11/14/2023 8:44 pm      INDICATION:  Signs/Symptoms:Atelectasis.      COMPARISON:  11/09/2023      ACCESSION NUMBER(S):  [id number]      ORDERING CLINICIAN:  LINH RONQUILLO      FINDINGS:  There is near-complete opacification of the left hemithorax with  several air bronchograms centrally, and only aeration remaining at  the apex. This has progressed from the prior exam and most likely due  to an effusion with underlying consolidation. There is volume loss  with shift of the heart mediastinum to the left. There is also  blunting at the right cardio hepatic angle since the prior exam  indicating an additional small right effusion.      ABDOMEN AND OTHER FINDINGS:  No remarkable upper abdominal findings.      BONES:  No acute osseous changes.      Impression: 1.  Airspace disease as described.      Signed by: Johann Wood 11/15/2023 9:33 AM  Dictation workstation:   [id number]  US renal complete  Narrative: Interpreted By:  Johann Wood,   STUDY:  US RENAL COMPLETE;  11/14/2023 8:42 pm      INDICATION:  Signs/Symptoms:Renal failure.      COMPARISON:  None.      ACCESSION NUMBER(S):  [id number]      ORDERING CLINICIAN:  LINH RONQUILLO      TECHNIQUE:  Multiple  images of the kidneys were obtained  , with color Doppler  for blood flow.  It is noted the exam was limited due to patient  habitus , limited mobility and continuous respiration.      FINDINGS:  RIGHT KIDNEY:  The right kidney measures 9.6 cm in length.  There is mild thinning  of the renal cortex at the lower pole indicating mild atrophy. The  remaining cortex appears maintained in homogeneous..  No  hydronephrosis or evidence of nephrolithiasis. Color Doppler  demonstrates renal blood flow.      LEFT KIDNEY:  The left kidney could not be visualized, presumably secondary to  limiting factors as above.      BLADDER:  Not visualized, also probably due to limiting factors and under  distension.      OTHER FINDINGS:  None significant.      Impression: Mild right renal cortical atrophy at the lower pole, otherwise no  hydronephrosis. The left kidney and bladder were not visualized.      Signed by: Johann Wood 11/15/2023 8:39 AM  Dictation workstation:   [id number]      Physical Exam    Relevant Results               Assessment/Plan   This patient currently has cardiac telemetry ordered; if you would like to modify or discontinue the telemetry order, click here to go to the orders activity to modify/discontinue the order.              Principal Problem:    Acute decompensated heart failure (CMS/HCC)          Thien Carcamo MD  Physician  Internal Medicine     H&P      Addendum     Date of Service: 11/10/2023  5:38 AM     Addendum       Expand All Collapse All    History Of Present Illness  Danielle Parisi is a 73 y.o. female presenting to emergency department from skilled nursing facility for evaluation of increasing shortness of breath and volume overload.  Patient has a PICC line and is on IV Zosyn for recurrent UTI.  Over the past few days she has had a worsening shortness of breath.  She does admit to increasing lower extremity edema.  Patient is basically bedbound, denies fever but does admit to a cough.  Patient  denies recent illness, nausea, vomiting, diarrhea.     In ED, , glucose 183, BUN 59, creatinine 1.46, GFR 38, AST 8, lactate 3.6 with a repeat pending.  WBCs 14.6, hemoglobin 8.3, hematocrit 29.7, troponin 17 with a repeat pending.  Chest x-ray completed showing moderate left pleural effusion with left basilar atelectasis, mild right basilar atelectasis per radiology review.  Last echocardiogram on file 2/27/2023 showing an ejection fraction of 60 to 65% with a dilated right atrium and severe tricuspid regurgitation.  EKG showing A-fib at a rate of 90 without ST elevation or depression per ER physician review.  Patient given Lasix IV in ED.  Consult placed to cardiology.  Patient admitted to telemetry under the care of Dr. Carcamo who will continue to follow.  I was asked to do H&P and place initial admission orders.     Past Medical History  Hypertension, A-fib on Eliquis, CONOR, hypothyroidism, lymphedema, diabetes, hyperlipidemia, history of DVT, obesity     Surgical History  Patient denies     Social History  She has no history on file for tobacco use, alcohol use, and drug use.     Family History  Reviewed and noncontributory     Allergies  Penicillins and Sulfa (sulfonamide antibiotics)     Review of Systems  A 10 point review of systems was completed and negative except what is listed in HPI  Physical Exam  Constitutional:       Appearance: She is obese.   HENT:      Mouth/Throat:      Mouth: Mucous membranes are dry.      Pharynx: Oropharynx is clear.   Eyes:      Pupils: Pupils are equal, round, and reactive to light.   Cardiovascular:      Rate and Rhythm: Rhythm irregularly irregular.   Pulmonary:      Breath sounds: Rales present.   Abdominal:      General: Bowel sounds are normal.   Musculoskeletal:         General: Normal range of motion.      Cervical back: Normal range of motion.   Skin:     General: Skin is warm and dry.      Capillary Refill: Capillary refill takes less than 2 seconds.  "  Neurological:      General: No focal deficit present.      Mental Status: She is alert. Mental status is at baseline.   Psychiatric:         Mood and Affect: Mood normal.         Behavior: Behavior normal.            Last Recorded Vitals  Blood pressure 102/56, pulse 104, temperature 35.8 °C (96.4 °F), resp. rate 18, height 1.702 m (5' 7\"), weight (!) 168 kg (370 lb 6 oz), SpO2 97 %.     Relevant Results  XR chest 1 view     Result Date: 11/10/2023  STUDY: Chest Radiograph;  11/09/2023 10:56 PM INDICATION: Shortness of breath. COMPARISON: XR chest 03/16/2023, 03/15/2023.  ACCESSION NUMBER(S): [id number] ORDERING CLINICIAN: SACHIN WELDON TECHNIQUE:  Frontal chest was obtained at 2229 hours. FINDINGS: CARDIOMEDIASTINAL SILHOUETTE: Cardiomediastinal silhouette is markedly enlarged and stable in size and configuration.  LUNGS: Left-sided PICC line is noted terminating in the region of the proximal SVC/left subclavian junction.  Mild right basilar atelectasis.  Moderate left pleural effusion is suggested with underlying left basilar atelectasis and/or consolidation.  ABDOMEN: No remarkable upper abdominal findings.  BONES: No acute osseous changes.     Moderate left pleural effusion with left basilar atelectasis and/or consolidation.  Mild right basilar atelectasis. Signed by Nakul Rucker MD                  Results for orders placed or performed during the hospital encounter of 11/09/23 (from the past 24 hour(s))   CBC and Auto Differential   Result Value Ref Range     WBC 14.6 (H) 4.4 - 11.3 x10*3/uL     nRBC 0.3 (H) 0.0 - 0.0 /100 WBCs     RBC 3.15 (L) 4.00 - 5.20 x10*6/uL     Hemoglobin 8.3 (L) 12.0 - 16.0 g/dL     Hematocrit 29.7 (L) 36.0 - 46.0 %     MCV 94 80 - 100 fL     MCH 26.3 26.0 - 34.0 pg     MCHC 27.9 (L) 32.0 - 36.0 g/dL     RDW 17.8 (H) 11.5 - 14.5 %     Platelets 306 150 - 450 x10*3/uL     Neutrophils % 92.8 40.0 - 80.0 %     Immature Granulocytes %, Automated 1.0 (H) 0.0 - 0.9 %     Lymphocytes " % 2.6 13.0 - 44.0 %     Monocytes % 3.4 2.0 - 10.0 %     Eosinophils % 0.1 0.0 - 6.0 %     Basophils % 0.1 0.0 - 2.0 %     Neutrophils Absolute 13.55 (H) 1.60 - 5.50 x10*3/uL     Immature Granulocytes Absolute, Automated 0.15 0.00 - 0.50 x10*3/uL     Lymphocytes Absolute 0.38 (L) 0.80 - 3.00 x10*3/uL     Monocytes Absolute 0.50 0.05 - 0.80 x10*3/uL     Eosinophils Absolute 0.01 0.00 - 0.40 x10*3/uL     Basophils Absolute 0.02 0.00 - 0.10 x10*3/uL   Magnesium   Result Value Ref Range     Magnesium 1.67 1.60 - 2.40 mg/dL   Comprehensive metabolic panel   Result Value Ref Range     Glucose 183 (H) 74 - 99 mg/dL     Sodium 142 136 - 145 mmol/L     Potassium 4.0 3.5 - 5.3 mmol/L     Chloride 102 98 - 107 mmol/L     Bicarbonate 32 21 - 32 mmol/L     Anion Gap 12 10 - 20 mmol/L     Urea Nitrogen 59 (H) 6 - 23 mg/dL     Creatinine 1.46 (H) 0.50 - 1.05 mg/dL     eGFR 38 (L) >60 mL/min/1.73m*2     Calcium 8.5 (L) 8.6 - 10.3 mg/dL     Albumin 3.0 (L) 3.4 - 5.0 g/dL     Alkaline Phosphatase 89 33 - 136 U/L     Total Protein 5.6 (L) 6.4 - 8.2 g/dL     AST 8 (L) 9 - 39 U/L     Bilirubin, Total 0.3 0.0 - 1.2 mg/dL     ALT 25 7 - 45 U/L   Protime-INR   Result Value Ref Range     Protime 11.6 9.8 - 12.8 seconds     INR 1.0 0.9 - 1.1   aPTT   Result Value Ref Range     aPTT 30 27 - 38 seconds   Lactate   Result Value Ref Range     Lactate 3.6 (H) 0.4 - 2.0 mmol/L   Type And Screen   Result Value Ref Range     ABO TYPE O       Rh TYPE POS       ANTIBODY SCREEN NEG     Troponin I, High Sensitivity   Result Value Ref Range     Troponin I, High Sensitivity 17 (H) 0 - 13 ng/L   B-Type Natriuretic Peptide   Result Value Ref Range      (H) 0 - 99 pg/mL   Blood Gas Venous   Result Value Ref Range     POCT pH, Venous 7.30 (L) 7.33 - 7.43 pH     POCT pCO2, Venous 64 (H) 41 - 51 mm Hg     POCT pO2, Venous 36 35 - 45 mm Hg     POCT SO2, Venous 61 45 - 75 %     POCT Oxy Hemoglobin, Venous 59.0 45.0 - 75.0 %     POCT Base Excess, Venous 3.2  (H) -2.0 - 3.0 mmol/L     POCT HCO3 Calculated, Venous 31.5 (H) 22.0 - 26.0 mmol/L     Patient Temperature 37.0 degrees Celsius     FiO2 36 %     Test Comment ICU-S     Urinalysis with Reflex Microscopic and Culture   Result Value Ref Range     Color, Urine Yellow Straw, Yellow     Appearance, Urine Hazy (N) Clear     Specific Gravity, Urine 1.015 1.005 - 1.035     pH, Urine 5.0 5.0, 5.5, 6.0, 6.5, 7.0, 7.5, 8.0     Protein, Urine NEGATIVE NEGATIVE mg/dL     Glucose, Urine NEGATIVE NEGATIVE mg/dL     Blood, Urine MODERATE (2+) (A) NEGATIVE     Ketones, Urine NEGATIVE NEGATIVE mg/dL     Bilirubin, Urine NEGATIVE NEGATIVE     Urobilinogen, Urine <2.0 <2.0 mg/dL     Nitrite, Urine NEGATIVE NEGATIVE     Leukocyte Esterase, Urine LARGE (3+) (A) NEGATIVE   Microscopic Only, Urine   Result Value Ref Range     WBC, Urine >50 (A) 1-5, NONE /HPF     WBC Clumps, Urine FEW Reference range not established. /HPF     RBC, Urine >20 (A) NONE, 1-2, 3-5 /HPF     Squamous Epithelial Cells, Urine 1-9 (SPARSE) Reference range not established. /HPF     Transitional Epithelial Cells, Urine 1-2 (FEW) Reference range not established. /HPF     Bacteria, Urine 1+ (A) NONE SEEN /HPF     Budding Yeast, Urine PRESENT (A) NONE /HPF     Mucus, Urine 1+ Reference range not established. /LPF     Hyaline Casts, Urine 2+ (A) NONE /LPF            Assessment/Plan   Danielle is a 73-year-old female patient presenting to emergency department for evaluation of shortness of breath and volume overload.  Patient found to have a BNP of 116, elevated troponin, elevated lactate, elevated WBCs.  Imaging showing moderate left pleural effusion with left basilar atelectasis and mild right basilar atelectasis.  Last echocardiogram on file 2/27/2023 with an EF of 60 to 65%.  Patient having new oxygen requirements moving from 2 L/min to 4 L/min.  Patient currently receiving IV Zosyn via PICC line at [facility] for recurrent UTI.  Patient found to be UTI  positive, no culture sent due to being on antibiotics chronically.  Patient given IV Lasix for volume overload in ED.  Consult placed to cardiology.  Admission for further medical management.     Acute decompensated heart failure  Admit to telemetry per Dr. Carcamo  Intake and output  See imaging results above  Daily weight  Lasix IV 40 mg in ED  Consult cardiology and appreciate input  Low-sodium diet  Fluid restriction  Chronic Cruz catheter     Diabetes/hypothyroidism/hypertension/A-fib/hyperlipidemia     Diabetic diet  ISS  Hypoglycemia protocol  Hold home oral antidiabetic medications when med rec is complete  Continue home medications  Continue home Eliquis when med rec is complete     DVT PPx  Continue home Eliquis when med rec is complete  SCDs  Activity as tolerated     I spent 30 minutes in the professional and overall care of this patient.  Patient fully evaluated on November, plan as above.                      Revision History          Patient fully evaluated on November 11.  Debrox for bilateral ear cerumen impactions.  Continue to diurese patient and recheck labs in AM.     Patient fully evaluated on November 12.  Continue aggressive diuresis.  BUN and creatinine are stable.  Recheck labs in AM.    Patient fully evaluated on November 13.  Rise in BUN and creatinine noted and patient will transition to oral diuretics.  Recheck labs in AM.  Recheck chemistries in a.m. to check for renal function  Patient fully evaluated on November 14 and rise in BUN and creatinine noted.  Diuretics have been held as well as ARB's.  Nephrology consultation obtained.  Recheck labs in a.m.    Patient fully evaluated on November 15.  Continue to monitor BUN and creatinine diuretics been held.    Patient fully evaluated on November 16.  Continue to monitor H&H which drop was noted.  Eliquis has been held.  Continued acute kidney injury noted and appreciate nephrology consultation.  Recheck labs in AM.    Patient fully  evaluated on November 17.  IV Lasix drip initiated for diuresis.  Patient has significant end-stage renal disease.  Thien Carcamo MD

## 2023-11-17 NOTE — PROGRESS NOTES
Cardiology Progress    Impression:  1.  HFpEF: Acute on chronic with predominant right-sided features.   Echocardiogram February 2023 showing normal EF, dilated RV with RV dysfunction, severe TR, RVSP 60.   2.  Permanent A-fib.  [id number].  3.  Chronic lymphedema  4.  CONOR on CKD  5.  Anemia requiring transfusion  6.  Hyperlipidemia  7.  Diabetes  8.  Hypothyroidism  9.  Hypertension  10.  Urinary retention with chronic indwelling catheter with recurrent UTIs.  Plan:  Fluid management per nephrology.  Diuretics currently on hold for CONOR  Continue metoprolol for rate control  Eliquis held for drop in hemoglobin.  No new recommendations  Please call again if problems.  HPI:  Has not been out of bed.  Breathing about the same.  Blood sugars very high today.  Renal function continues to deteriorate.  Meds:  Scheduled medications  busPIRone, 5 mg, oral, BID  carbamide peroxide, 10 drop, Each Ear, BID  [Held by provider] dilTIAZem CD, 240 mg, oral, Daily  famotidine, 20 mg, oral, Daily  [Held by provider] furosemide, 40 mg, intravenous, q12h  gabapentin, 100 mg, oral, Daily  insulin lispro, 0-5 Units, subcutaneous, TID with meals  iron sucrose, 100 mg, intravenous, Daily  lactobacillus acidophilus, 1 tablet, oral, Daily  levothyroxine, 100 mcg, oral, Once per day on Mon  levothyroxine, 50 mcg, oral, Once per day on Sun Tue Wed Thu Fri Sat  metoprolol tartrate, 25 mg, oral, BID  piperacillin-tazobactam, 2.25 g, intravenous, q6h  polyethylene glycol, 17 g, oral, Daily  potassium chloride CR, 20 mEq, oral, Daily  predniSONE, 20 mg, oral, Daily  [Held by provider] torsemide, 20 mg, oral, Daily  triamcinolone, , Topical, BID      Continuous medications  sodium bicarbonate, 50 mL/hr, Last Rate: 50 mL/hr (11/16/23 1507)      PRN medications  PRN medications: acetaminophen, bisacodyl, dextrose 10 % in water (D10W), dextrose, glucagon, LORazepam, nystatin, oxygen, sodium zirconium cyclosilicate, traMADol, zinc  oxide    Physical exam:  Vitals:    11/17/23 1137   BP:    Pulse: 99   Resp: 22   Temp:    SpO2: 99%      No JVD.  Poor air entry.  Grossly edematous  EKG:  Telemetry shows A-fib with adequate rate control  Echo:  February 2023 showing normal EF, dilated RV with RV dysfunction, severe TR, RVSP 60      Labs:  Lab Results   Component Value Date    WBC 15.8 (H) 11/17/2023    HGB 7.2 (L) 11/17/2023    HCT 25.0 (L) 11/17/2023     11/17/2023    CHOL 180 11/15/2023    TRIG 128 11/15/2023    HDL 58.7 11/15/2023    ALT 22 11/17/2023    AST 7 (L) 11/17/2023     (L) 11/17/2023    K 5.6 (H) 11/17/2023    CL 94 (L) 11/17/2023    CREATININE 3.99 (H) 11/17/2023    BUN 90 (H) 11/17/2023    CO2 29 11/17/2023    TSH 1.27 02/27/2023    INR 1.0 11/09/2023    HGBA1C 6.5 (A) 02/14/2023     par

## 2023-11-17 NOTE — CARE PLAN
The patient's goals for the shift include      The clinical goals for the shift include pain control

## 2023-11-18 ENCOUNTER — APPOINTMENT (OUTPATIENT)
Dept: DIALYSIS | Facility: HOSPITAL | Age: 73
End: 2023-11-18
Payer: COMMERCIAL

## 2023-11-18 ENCOUNTER — APPOINTMENT (OUTPATIENT)
Dept: RADIOLOGY | Facility: HOSPITAL | Age: 73
DRG: 291 | End: 2023-11-18
Payer: MEDICARE

## 2023-11-18 LAB
ALBUMIN SERPL BCP-MCNC: 3.2 G/DL (ref 3.4–5)
ALP SERPL-CCNC: 91 U/L (ref 33–136)
ALT SERPL W P-5'-P-CCNC: 22 U/L (ref 7–45)
ANION GAP SERPL CALC-SCNC: 19 MMOL/L (ref 10–20)
AST SERPL W P-5'-P-CCNC: 6 U/L (ref 9–39)
BILIRUB SERPL-MCNC: 0.3 MG/DL (ref 0–1.2)
BUN SERPL-MCNC: 91 MG/DL (ref 6–23)
CALCIUM SERPL-MCNC: 7.7 MG/DL (ref 8.6–10.3)
CHLORIDE SERPL-SCNC: 93 MMOL/L (ref 98–107)
CO2 SERPL-SCNC: 27 MMOL/L (ref 21–32)
CREAT SERPL-MCNC: 4.14 MG/DL (ref 0.5–1.05)
ERYTHROCYTE [DISTWIDTH] IN BLOOD BY AUTOMATED COUNT: 18.2 % (ref 11.5–14.5)
GFR SERPL CREATININE-BSD FRML MDRD: 11 ML/MIN/1.73M*2
GLUCOSE BLD MANUAL STRIP-MCNC: 177 MG/DL (ref 74–99)
GLUCOSE BLD MANUAL STRIP-MCNC: 235 MG/DL (ref 74–99)
GLUCOSE BLD MANUAL STRIP-MCNC: 351 MG/DL (ref 74–99)
GLUCOSE BLD MANUAL STRIP-MCNC: 364 MG/DL (ref 74–99)
GLUCOSE SERPL-MCNC: 364 MG/DL (ref 74–99)
HCT VFR BLD AUTO: 24.8 % (ref 36–46)
HGB BLD-MCNC: 7.3 G/DL (ref 12–16)
MAGNESIUM SERPL-MCNC: 2.08 MG/DL (ref 1.6–2.4)
MCH RBC QN AUTO: 27 PG (ref 26–34)
MCHC RBC AUTO-ENTMCNC: 29.4 G/DL (ref 32–36)
MCV RBC AUTO: 92 FL (ref 80–100)
NRBC BLD-RTO: 1 /100 WBCS (ref 0–0)
PHOSPHATE SERPL-MCNC: 10.4 MG/DL (ref 2.5–4.9)
PLATELET # BLD AUTO: 218 X10*3/UL (ref 150–450)
POTASSIUM SERPL-SCNC: 5.6 MMOL/L (ref 3.5–5.3)
PROT SERPL-MCNC: 5.2 G/DL (ref 6.4–8.2)
RBC # BLD AUTO: 2.7 X10*6/UL (ref 4–5.2)
SODIUM SERPL-SCNC: 133 MMOL/L (ref 136–145)
WBC # BLD AUTO: 16.6 X10*3/UL (ref 4.4–11.3)

## 2023-11-18 PROCEDURE — 2500000004 HC RX 250 GENERAL PHARMACY W/ HCPCS (ALT 636 FOR OP/ED): Performed by: INTERNAL MEDICINE

## 2023-11-18 PROCEDURE — P9047 ALBUMIN (HUMAN), 25%, 50ML: HCPCS | Mod: JZ | Performed by: INTERNAL MEDICINE

## 2023-11-18 PROCEDURE — 82947 ASSAY GLUCOSE BLOOD QUANT: CPT | Mod: 59

## 2023-11-18 PROCEDURE — 2500000001 HC RX 250 WO HCPCS SELF ADMINISTERED DRUGS (ALT 637 FOR MEDICARE OP): Performed by: NURSE PRACTITIONER

## 2023-11-18 PROCEDURE — 36558 INSERT TUNNELED CV CATH: CPT | Mod: RT | Performed by: RADIOLOGY

## 2023-11-18 PROCEDURE — 80053 COMPREHEN METABOLIC PANEL: CPT | Performed by: INTERNAL MEDICINE

## 2023-11-18 PROCEDURE — C1752 CATH,HEMODIALYSIS,SHORT-TERM: HCPCS

## 2023-11-18 PROCEDURE — 71045 X-RAY EXAM CHEST 1 VIEW: CPT

## 2023-11-18 PROCEDURE — 2500000001 HC RX 250 WO HCPCS SELF ADMINISTERED DRUGS (ALT 637 FOR MEDICARE OP): Performed by: INTERNAL MEDICINE

## 2023-11-18 PROCEDURE — 2500000002 HC RX 250 W HCPCS SELF ADMINISTERED DRUGS (ALT 637 FOR MEDICARE OP, ALT 636 FOR OP/ED): Performed by: NURSE PRACTITIONER

## 2023-11-18 PROCEDURE — 71045 X-RAY EXAM CHEST 1 VIEW: CPT | Performed by: RADIOLOGY

## 2023-11-18 PROCEDURE — 85027 COMPLETE CBC AUTOMATED: CPT | Performed by: INTERNAL MEDICINE

## 2023-11-18 PROCEDURE — 02HV33Z INSERTION OF INFUSION DEVICE INTO SUPERIOR VENA CAVA, PERCUTANEOUS APPROACH: ICD-10-PCS | Performed by: INTERNAL MEDICINE

## 2023-11-18 PROCEDURE — 2780000003 HC OR 278 NO HCPCS

## 2023-11-18 PROCEDURE — 2500000002 HC RX 250 W HCPCS SELF ADMINISTERED DRUGS (ALT 637 FOR MEDICARE OP, ALT 636 FOR OP/ED): Performed by: INTERNAL MEDICINE

## 2023-11-18 PROCEDURE — 83735 ASSAY OF MAGNESIUM: CPT | Performed by: INTERNAL MEDICINE

## 2023-11-18 PROCEDURE — 8010000001 HC DIALYSIS - HEMODIALYSIS PER DAY

## 2023-11-18 PROCEDURE — 76937 US GUIDE VASCULAR ACCESS: CPT | Performed by: RADIOLOGY

## 2023-11-18 PROCEDURE — 76937 US GUIDE VASCULAR ACCESS: CPT

## 2023-11-18 PROCEDURE — 2060000001 HC INTERMEDIATE ICU ROOM DAILY

## 2023-11-18 PROCEDURE — 77001 FLUOROGUIDE FOR VEIN DEVICE: CPT | Performed by: RADIOLOGY

## 2023-11-18 PROCEDURE — 2500000004 HC RX 250 GENERAL PHARMACY W/ HCPCS (ALT 636 FOR OP/ED): Performed by: NURSE PRACTITIONER

## 2023-11-18 PROCEDURE — 2500000004 HC RX 250 GENERAL PHARMACY W/ HCPCS (ALT 636 FOR OP/ED): Mod: JZ | Performed by: INTERNAL MEDICINE

## 2023-11-18 PROCEDURE — 84100 ASSAY OF PHOSPHORUS: CPT | Mod: 59 | Performed by: INTERNAL MEDICINE

## 2023-11-18 RX ORDER — ALBUMIN HUMAN 250 G/1000ML
25 SOLUTION INTRAVENOUS ONCE
Status: COMPLETED | OUTPATIENT
Start: 2023-11-18 | End: 2023-11-18

## 2023-11-18 RX ORDER — HEPARIN SODIUM 1000 [USP'U]/ML
2000 INJECTION, SOLUTION INTRAVENOUS; SUBCUTANEOUS
Status: CANCELLED | OUTPATIENT
Start: 2023-11-19

## 2023-11-18 RX ORDER — INSULIN LISPRO 100 [IU]/ML
0-15 INJECTION, SOLUTION INTRAVENOUS; SUBCUTANEOUS
Status: DISCONTINUED | OUTPATIENT
Start: 2023-11-18 | End: 2023-11-22 | Stop reason: HOSPADM

## 2023-11-18 RX ORDER — ALBUMIN HUMAN 250 G/1000ML
25 SOLUTION INTRAVENOUS DAILY
Status: CANCELLED | OUTPATIENT
Start: 2023-11-19

## 2023-11-18 RX ORDER — HEPARIN SODIUM 1000 [USP'U]/ML
2200 INJECTION, SOLUTION INTRAVENOUS; SUBCUTANEOUS
Status: DISCONTINUED | OUTPATIENT
Start: 2023-11-18 | End: 2023-11-22 | Stop reason: HOSPADM

## 2023-11-18 RX ORDER — INSULIN LISPRO 100 [IU]/ML
0-15 INJECTION, SOLUTION INTRAVENOUS; SUBCUTANEOUS
Status: DISCONTINUED | OUTPATIENT
Start: 2023-11-18 | End: 2023-11-18

## 2023-11-18 RX ORDER — HEPARIN SODIUM 1000 [USP'U]/ML
2400 INJECTION, SOLUTION INTRAVENOUS; SUBCUTANEOUS
Status: DISCONTINUED | OUTPATIENT
Start: 2023-11-18 | End: 2023-11-22 | Stop reason: HOSPADM

## 2023-11-18 RX ORDER — ALBUMIN HUMAN 250 G/1000ML
25 SOLUTION INTRAVENOUS 3 TIMES DAILY PRN
Status: DISCONTINUED | OUTPATIENT
Start: 2023-11-18 | End: 2023-11-22 | Stop reason: HOSPADM

## 2023-11-18 RX ADMIN — METOPROLOL TARTRATE 25 MG: 25 TABLET, FILM COATED ORAL at 09:22

## 2023-11-18 RX ADMIN — PREDNISONE 20 MG: 20 TABLET ORAL at 09:22

## 2023-11-18 RX ADMIN — FAMOTIDINE 20 MG: 20 TABLET ORAL at 09:22

## 2023-11-18 RX ADMIN — PIPERACILLIN SODIUM AND TAZOBACTAM SODIUM 2.25 G: 2; .25 INJECTION, SOLUTION INTRAVENOUS at 09:21

## 2023-11-18 RX ADMIN — SODIUM CHLORIDE 200 ML: 9 INJECTION, SOLUTION INTRAVENOUS at 12:30

## 2023-11-18 RX ADMIN — ALBUMIN HUMAN 25 G: 0.25 SOLUTION INTRAVENOUS at 16:21

## 2023-11-18 RX ADMIN — HEPARIN SODIUM 2200 UNITS: 1000 INJECTION, SOLUTION INTRAVENOUS; SUBCUTANEOUS at 14:15

## 2023-11-18 RX ADMIN — INSULIN LISPRO 4 UNITS: 100 INJECTION, SOLUTION INTRAVENOUS; SUBCUTANEOUS at 09:22

## 2023-11-18 RX ADMIN — LEVOTHYROXINE SODIUM 50 MCG: 100 TABLET ORAL at 05:32

## 2023-11-18 RX ADMIN — PIPERACILLIN SODIUM AND TAZOBACTAM SODIUM 2.25 G: 2; .25 INJECTION, SOLUTION INTRAVENOUS at 02:00

## 2023-11-18 RX ADMIN — ACETAMINOPHEN 325 MG: 325 TABLET ORAL at 02:00

## 2023-11-18 RX ADMIN — BUSPIRONE HYDROCHLORIDE 5 MG: 5 TABLET ORAL at 09:22

## 2023-11-18 RX ADMIN — PIPERACILLIN SODIUM AND TAZOBACTAM SODIUM 2.25 G: 2; .25 INJECTION, SOLUTION INTRAVENOUS at 20:21

## 2023-11-18 RX ADMIN — Medication 10 DROP: at 20:18

## 2023-11-18 RX ADMIN — INSULIN LISPRO 15 UNITS: 100 INJECTION, SOLUTION INTRAVENOUS; SUBCUTANEOUS at 12:00

## 2023-11-18 RX ADMIN — Medication 10 DROP: at 09:18

## 2023-11-18 RX ADMIN — TRIAMCINOLONE ACETONIDE: 1 OINTMENT TOPICAL at 09:00

## 2023-11-18 RX ADMIN — ALBUMIN HUMAN 25 G: 0.25 SOLUTION INTRAVENOUS at 14:53

## 2023-11-18 RX ADMIN — POLYETHYLENE GLYCOL 3350 17 G: 17 POWDER, FOR SOLUTION ORAL at 09:21

## 2023-11-18 RX ADMIN — BUSPIRONE HYDROCHLORIDE 5 MG: 5 TABLET ORAL at 20:18

## 2023-11-18 RX ADMIN — IRON SUCROSE 100 MG: 20 INJECTION, SOLUTION INTRAVENOUS at 09:21

## 2023-11-18 RX ADMIN — HEPARIN SODIUM 2400 UNITS: 1000 INJECTION, SOLUTION INTRAVENOUS; SUBCUTANEOUS at 14:15

## 2023-11-18 RX ADMIN — Medication 1 TABLET: at 09:22

## 2023-11-18 RX ADMIN — TRAMADOL HYDROCHLORIDE 50 MG: 50 TABLET, COATED ORAL at 05:33

## 2023-11-18 RX ADMIN — TRIAMCINOLONE ACETONIDE: 1 OINTMENT TOPICAL at 21:00

## 2023-11-18 RX ADMIN — TRAMADOL HYDROCHLORIDE 50 MG: 50 TABLET, COATED ORAL at 12:15

## 2023-11-18 RX ADMIN — TRAMADOL HYDROCHLORIDE 50 MG: 50 TABLET, COATED ORAL at 20:18

## 2023-11-18 RX ADMIN — GABAPENTIN 100 MG: 100 CAPSULE ORAL at 09:21

## 2023-11-18 ASSESSMENT — PAIN SCALES - PAIN ASSESSMENT IN ADVANCED DEMENTIA (PAINAD)
NEGVOCALIZATION: REPEATED TROUBLED CALLING OUT, LOUD MOANING/GROANING, CRYING
FACIALEXPRESSION: SMILING OR INEXPRESSIVE
CONSOLABILITY: UNABLE TO CONSOLE, DISTRACT OR REASSURE
TOTALSCORE: IMPROVED
BODYLANGUAGE: RELAXED
FACIALEXPRESSION: SMILING OR INEXPRESSIVE
BREATHING: NORMAL
BODYLANGUAGE: TENSE, DISTRESSED PACING, FIDGETING
TOTALSCORE: 0
TOTALSCORE: MEDICATION (SEE MAR);REPOSITIONED
TOTALSCORE: SLEEPING
BREATHING: NORMAL
CONSOLABILITY: NO NEED TO CONSOLE
NEGVOCALIZATION: OCCASIONAL MOAN/GROAN, LOW SPEECH, NEGATIVE/DISAPPROVING QUALITY
CONSOLABILITY: NO NEED TO CONSOLE
TOTALSCORE: 9
TOTALSCORE: 1
BODYLANGUAGE: RELAXED
BREATHING: NOISY LABORED BREATHING, LONG PERIODS OF HYPERVENTILATION, CHEYNE-STOKES RESPIRATIONS
FACIALEXPRESSION: FACIAL GRIMACING

## 2023-11-18 ASSESSMENT — COGNITIVE AND FUNCTIONAL STATUS - GENERAL
CLIMB 3 TO 5 STEPS WITH RAILING: TOTAL
DRESSING REGULAR UPPER BODY CLOTHING: TOTAL
STANDING UP FROM CHAIR USING ARMS: TOTAL
MOBILITY SCORE: 6
MOVING FROM LYING ON BACK TO SITTING ON SIDE OF FLAT BED WITH BEDRAILS: TOTAL
TURNING FROM BACK TO SIDE WHILE IN FLAT BAD: TOTAL
TOILETING: TOTAL
EATING MEALS: A LOT
MOVING TO AND FROM BED TO CHAIR: TOTAL
WALKING IN HOSPITAL ROOM: TOTAL
PERSONAL GROOMING: TOTAL

## 2023-11-18 ASSESSMENT — PAIN - FUNCTIONAL ASSESSMENT
PAIN_FUNCTIONAL_ASSESSMENT: PAINAD (PAIN ASSESSMENT IN ADVANCED DEMENTIA SCALE)
PAIN_FUNCTIONAL_ASSESSMENT: 0-10

## 2023-11-18 ASSESSMENT — PAIN DESCRIPTION - LOCATION
LOCATION: LEG
LOCATION: LEG

## 2023-11-18 ASSESSMENT — PAIN SCALES - GENERAL
PAINLEVEL_OUTOF10: 5 - MODERATE PAIN
PAINLEVEL_OUTOF10: 0 - NO PAIN
PAINLEVEL_OUTOF10: 8
PAINLEVEL_OUTOF10: 5 - MODERATE PAIN
PAINLEVEL_OUTOF10: 9
PAINLEVEL_OUTOF10: 10 - WORST POSSIBLE PAIN
PAINLEVEL_OUTOF10: 8

## 2023-11-18 ASSESSMENT — PAIN DESCRIPTION - ORIENTATION
ORIENTATION: LEFT
ORIENTATION: LEFT

## 2023-11-18 NOTE — CARE PLAN
Problem: Heart Failure  Goal: Improved urinary output this shift  Outcome: Not Progressing  Flowsheets (Taken 11/18/2023 0258)  Improved urinary output this shift:   Med administration/monitor effect   Monitor serum electrolytes/replace per order  Goal: Reduction in peripheral edema within 24 hours  Outcome: Not Progressing  Flowsheets (Taken 11/18/2023 0258)  Reduction in peripheral edema within 24 hours: Monitor edema/skin/mucous membrane integrity  Goal: Report improvement of dyspnea/breathlessness this shift  Outcome: Not Progressing  Flowsheets (Taken 11/18/2023 0258)  Report improvement of dyspnea/breathlessness this shift:   Encourage activity/mobility/ROM   Facilitate activity/mobility per patient tolerance/advance  Goal: Weight from fluid excess reduced over 2-3 days, then stabilize  Outcome: Not Progressing  Flowsheets (Taken 11/18/2023 0258)  Weight from fluid excess reduced over 2-3 days, then stabilize:   Med administration/monitor effect   Monitor intake/output and daily weight   Monitor bowel elimination   Monitor serum electrolytes/replace per order  Goal: Increase self care and/or family involvement in 24 hours  Outcome: Not Progressing  Flowsheets (Taken 11/18/2023 0258)  Increase self care and/or family involvement in 24 hours:   Organize tasks/allow time for rest   Assess for signs/symptoms of depression     Problem: Skin  Goal: Decreased wound size/increased tissue granulation at next dressing change  Outcome: Not Progressing  Flowsheets (Taken 11/18/2023 0258)  Decreased wound size/increased tissue granulation at next dressing change:   Promote sleep for wound healing   Utilize specialty bed per algorithm  Goal: Participates in plan/prevention/treatment measures  Outcome: Not Progressing  Flowsheets (Taken 11/18/2023 0258)  Participates in plan/prevention/treatment measures: Elevate heels  Goal: Prevent/manage excess moisture  Outcome: Not Progressing  Flowsheets (Taken 11/18/2023  0258)  Prevent/manage excess moisture:   Cleanse incontinence/protect with barrier cream   Moisturize dry skin   Use wicking fabric (obtain order)  Note: Moisture in many folds     Problem: Pain - Adult  Goal: Verbalizes/displays adequate comfort level or baseline comfort level  Outcome: Not Progressing  Flowsheets (Taken 11/18/2023 0258)  Verbalizes/displays adequate comfort level or baseline comfort level:   Encourage patient to monitor pain and request assistance   Assess pain using appropriate pain scale   Administer analgesics based on type and severity of pain and evaluate response   Implement non-pharmacological measures as appropriate and evaluate response     Problem: Discharge Planning  Goal: Discharge to home or other facility with appropriate resources  Outcome: Not Progressing  Flowsheets (Taken 11/18/2023 0258)  Discharge to home or other facility with appropriate resources: Identify discharge learning needs (meds, wound care, etc)  Note: Renal status worsening - not ready for DC     Problem: Chronic Conditions and Co-morbidities  Goal: Patient's chronic conditions and co-morbidity symptoms are monitored and maintained or improved  Outcome: Not Progressing  Flowsheets (Taken 11/18/2023 0258)  Care Plan - Patient's Chronic Conditions and Co-Morbidity Symptoms are Monitored and Maintained or Improved:   Monitor and assess patient's chronic conditions and comorbid symptoms for stability, deterioration, or improvement   Collaborate with multidisciplinary team to address chronic and comorbid conditions and prevent exacerbation or deterioration   Update acute care plan with appropriate goals if chronic or comorbid symptoms are exacerbated and prevent overall improvement and discharge  Note: Renal status worsening.  Elevated blood sugars need addressed.   The patient's goals for the shift include rest and comfort    The clinical goals for the shift include rest and comfort    Over the shift, the patient did  not make progress toward the following goals. Barriers to progression include poor kidney status and overall worsening condition. Recommendations to address these barriers include lasix drip, keeping BP above 90, possible HD  Also needs DM/blood glucose addressed.    Problem: Heart Failure  Goal: Improved urinary output this shift  Outcome: Not Progressing  Flowsheets (Taken 11/18/2023 0258)  Improved urinary output this shift:   Med administration/monitor effect   Monitor serum electrolytes/replace per order  Goal: Reduction in peripheral edema within 24 hours  Outcome: Not Progressing  Flowsheets (Taken 11/18/2023 0258)  Reduction in peripheral edema within 24 hours: Monitor edema/skin/mucous membrane integrity  Goal: Report improvement of dyspnea/breathlessness this shift  Outcome: Not Progressing  Flowsheets (Taken 11/18/2023 0258)  Report improvement of dyspnea/breathlessness this shift:   Encourage activity/mobility/ROM   Facilitate activity/mobility per patient tolerance/advance  Goal: Weight from fluid excess reduced over 2-3 days, then stabilize  Outcome: Not Progressing  Flowsheets (Taken 11/18/2023 0258)  Weight from fluid excess reduced over 2-3 days, then stabilize:   Med administration/monitor effect   Monitor intake/output and daily weight   Monitor bowel elimination   Monitor serum electrolytes/replace per order  Goal: Increase self care and/or family involvement in 24 hours  Outcome: Not Progressing  Flowsheets (Taken 11/18/2023 0258)  Increase self care and/or family involvement in 24 hours:   Organize tasks/allow time for rest   Assess for signs/symptoms of depression     Problem: Skin  Goal: Decreased wound size/increased tissue granulation at next dressing change  Outcome: Not Progressing  Flowsheets (Taken 11/18/2023 0258)  Decreased wound size/increased tissue granulation at next dressing change:   Promote sleep for wound healing   Utilize specialty bed per algorithm  Goal: Participates in  plan/prevention/treatment measures  Outcome: Not Progressing  Flowsheets (Taken 11/18/2023 0258)  Participates in plan/prevention/treatment measures: Elevate heels  Goal: Prevent/manage excess moisture  Outcome: Not Progressing  Flowsheets (Taken 11/18/2023 0258)  Prevent/manage excess moisture:   Cleanse incontinence/protect with barrier cream   Moisturize dry skin   Use wicking fabric (obtain order)  Note: Moisture in many folds     Problem: Pain - Adult  Goal: Verbalizes/displays adequate comfort level or baseline comfort level  Outcome: Not Progressing  Flowsheets (Taken 11/18/2023 0258)  Verbalizes/displays adequate comfort level or baseline comfort level:   Encourage patient to monitor pain and request assistance   Assess pain using appropriate pain scale   Administer analgesics based on type and severity of pain and evaluate response   Implement non-pharmacological measures as appropriate and evaluate response     Problem: Discharge Planning  Goal: Discharge to home or other facility with appropriate resources  Outcome: Not Progressing  Flowsheets (Taken 11/18/2023 0258)  Discharge to home or other facility with appropriate resources: Identify discharge learning needs (meds, wound care, etc)  Note: Renal status worsening - not ready for DC     Problem: Chronic Conditions and Co-morbidities  Goal: Patient's chronic conditions and co-morbidity symptoms are monitored and maintained or improved  Outcome: Not Progressing  Flowsheets (Taken 11/18/2023 0258)  Care Plan - Patient's Chronic Conditions and Co-Morbidity Symptoms are Monitored and Maintained or Improved:   Monitor and assess patient's chronic conditions and comorbid symptoms for stability, deterioration, or improvement   Collaborate with multidisciplinary team to address chronic and comorbid conditions and prevent exacerbation or deterioration   Update acute care plan with appropriate goals if chronic or comorbid symptoms are exacerbated and prevent  overall improvement and discharge  Note: Renal status worsening.  Elevated blood sugars need addressed.

## 2023-11-18 NOTE — PROGRESS NOTES
Danielle Parisi is a 73 y.o. female on day 8 of admission presenting with Acute decompensated heart failure (CMS/HCC).      Subjective   Patient fully evaluated on November 11 and clinically improving.       Objective     Last Recorded Vitals  BP 90/50   Pulse 86   Temp 35.3 °C (95.5 °F) (Temporal)   Resp 22   Wt (!) 168 kg (370 lb 6 oz)   SpO2 96%   Intake/Output last 3 Shifts:    Intake/Output Summary (Last 24 hours) at 11/18/2023 1532  Last data filed at 11/18/2023 1433  Gross per 24 hour   Intake 1612.87 ml   Output --   Net 1612.87 ml         Admission Weight  Weight: 150 kg (330 lb) (11/09/23 2215)    Daily Weight  11/17/23 : (!) 168 kg (370 lb 6 oz)    Image Results  XR chest 1 view  Narrative: Interpreted By:  Radha Peres,   STUDY:  XR CHEST 1 VIEW;  11/18/2023 1:35 pm      INDICATION:  Signs/Symptoms:post dialysis catheter.      COMPARISON:  11/14/2023      ACCESSION NUMBER(S):  [id number]      ORDERING CLINICIAN:  MERI RANKIN      FINDINGS:  CARDIOMEDIASTINAL SILHOUETTE:  Cardiomediastinal silhouette is normal in size and configuration.  There is a right subclavian large bore catheter with the tip in the  superior vena cava. There is a left PICC line with the tip in the  left brachiocephalic vein.      LUNGS:  Again there is complete opacification of the left hemithorax which  could represent collapse of the left lung or large effusion. There is  mediastinal shift to the left suggesting volume loss at the left  lung. There is progressive consolidation at the right lung base.      ABDOMEN:  No remarkable upper abdominal findings.          BONES:  No acute osseous changes.      Impression: 1. Complete opacification left hemithorax, unchanged compared to  11/14/2023, likely left lung collapse.  2. Progressive pneumonia right lung base.      MACRO:  None      Signed by: Radha Peres 11/18/2023 1:46 PM  Dictation workstation:   [id number]      Physical Exam    Relevant Results                Assessment/Plan   This patient currently has cardiac telemetry ordered; if you would like to modify or discontinue the telemetry order, click here to go to the orders activity to modify/discontinue the order.              Principal Problem:    Acute decompensated heart failure (CMS/[facility])          Thien Carcamo MD  Physician  Internal Medicine     H&P      Addendum     Date of Service: 11/10/2023  5:38 AM     Addendum       Expand All Collapse All    History Of Present Illness  Danielle Parisi is a 73 y.o. female presenting to emergency department from skilled nursing facility for evaluation of increasing shortness of breath and volume overload.  Patient has a PICC line and is on IV Zosyn for recurrent UTI.  Over the past few days she has had a worsening shortness of breath.  She does admit to increasing lower extremity edema.  Patient is basically bedbound, denies fever but does admit to a cough.  Patient denies recent illness, nausea, vomiting, diarrhea.     In ED, , glucose 183, BUN 59, creatinine 1.46, GFR 38, AST 8, lactate 3.6 with a repeat pending.  WBCs 14.6, hemoglobin 8.3, hematocrit 29.7, troponin 17 with a repeat pending.  Chest x-ray completed showing moderate left pleural effusion with left basilar atelectasis, mild right basilar atelectasis per radiology review.  Last echocardiogram on file 2/27/2023 showing an ejection fraction of 60 to 65% with a dilated right atrium and severe tricuspid regurgitation.  EKG showing A-fib at a rate of 90 without ST elevation or depression per ER physician review.  Patient given Lasix IV in ED.  Consult placed to cardiology.  Patient admitted to telemetry under the care of Dr. Carcamo who will continue to follow.  I was asked to do H&P and place initial admission orders.     Past Medical History  Hypertension, A-fib on Eliquis, CONOR, hypothyroidism, lymphedema, diabetes, hyperlipidemia, history of DVT, obesity     Surgical History  Patient denies     Social  "History  She has no history on file for tobacco use, alcohol use, and drug use.     Family History  Reviewed and noncontributory     Allergies  Penicillins and Sulfa (sulfonamide antibiotics)     Review of Systems  A 10 point review of systems was completed and negative except what is listed in HPI  Physical Exam  Constitutional:       Appearance: She is obese.   HENT:      Mouth/Throat:      Mouth: Mucous membranes are dry.      Pharynx: Oropharynx is clear.   Eyes:      Pupils: Pupils are equal, round, and reactive to light.   Cardiovascular:      Rate and Rhythm: Rhythm irregularly irregular.   Pulmonary:      Breath sounds: Rales present.   Abdominal:      General: Bowel sounds are normal.   Musculoskeletal:         General: Normal range of motion.      Cervical back: Normal range of motion.   Skin:     General: Skin is warm and dry.      Capillary Refill: Capillary refill takes less than 2 seconds.   Neurological:      General: No focal deficit present.      Mental Status: She is alert. Mental status is at baseline.   Psychiatric:         Mood and Affect: Mood normal.         Behavior: Behavior normal.            Last Recorded Vitals  Blood pressure 102/56, pulse 104, temperature 35.8 °C (96.4 °F), resp. rate 18, height 1.702 m (5' 7\"), weight (!) 168 kg (370 lb 6 oz), SpO2 97 %.     Relevant Results  XR chest 1 view     Result Date: 11/10/2023  STUDY: Chest Radiograph;  11/09/2023 10:56 PM INDICATION: Shortness of breath. COMPARISON: XR chest 03/16/2023, 03/15/2023.  ACCESSION NUMBER(S): [id number] ORDERING CLINICIAN: SACHIN WELDON TECHNIQUE:  Frontal chest was obtained at 2229 hours. FINDINGS: CARDIOMEDIASTINAL SILHOUETTE: Cardiomediastinal silhouette is markedly enlarged and stable in size and configuration.  LUNGS: Left-sided PICC line is noted terminating in the region of the proximal SVC/left subclavian junction.  Mild right basilar atelectasis.  Moderate left pleural effusion is suggested with " underlying left basilar atelectasis and/or consolidation.  ABDOMEN: No remarkable upper abdominal findings.  BONES: No acute osseous changes.     Moderate left pleural effusion with left basilar atelectasis and/or consolidation.  Mild right basilar atelectasis. Signed by Nakul Rucker MD                  Results for orders placed or performed during the hospital encounter of 11/09/23 (from the past 24 hour(s))   CBC and Auto Differential   Result Value Ref Range     WBC 14.6 (H) 4.4 - 11.3 x10*3/uL     nRBC 0.3 (H) 0.0 - 0.0 /100 WBCs     RBC 3.15 (L) 4.00 - 5.20 x10*6/uL     Hemoglobin 8.3 (L) 12.0 - 16.0 g/dL     Hematocrit 29.7 (L) 36.0 - 46.0 %     MCV 94 80 - 100 fL     MCH 26.3 26.0 - 34.0 pg     MCHC 27.9 (L) 32.0 - 36.0 g/dL     RDW 17.8 (H) 11.5 - 14.5 %     Platelets 306 150 - 450 x10*3/uL     Neutrophils % 92.8 40.0 - 80.0 %     Immature Granulocytes %, Automated 1.0 (H) 0.0 - 0.9 %     Lymphocytes % 2.6 13.0 - 44.0 %     Monocytes % 3.4 2.0 - 10.0 %     Eosinophils % 0.1 0.0 - 6.0 %     Basophils % 0.1 0.0 - 2.0 %     Neutrophils Absolute 13.55 (H) 1.60 - 5.50 x10*3/uL     Immature Granulocytes Absolute, Automated 0.15 0.00 - 0.50 x10*3/uL     Lymphocytes Absolute 0.38 (L) 0.80 - 3.00 x10*3/uL     Monocytes Absolute 0.50 0.05 - 0.80 x10*3/uL     Eosinophils Absolute 0.01 0.00 - 0.40 x10*3/uL     Basophils Absolute 0.02 0.00 - 0.10 x10*3/uL   Magnesium   Result Value Ref Range     Magnesium 1.67 1.60 - 2.40 mg/dL   Comprehensive metabolic panel   Result Value Ref Range     Glucose 183 (H) 74 - 99 mg/dL     Sodium 142 136 - 145 mmol/L     Potassium 4.0 3.5 - 5.3 mmol/L     Chloride 102 98 - 107 mmol/L     Bicarbonate 32 21 - 32 mmol/L     Anion Gap 12 10 - 20 mmol/L     Urea Nitrogen 59 (H) 6 - 23 mg/dL     Creatinine 1.46 (H) 0.50 - 1.05 mg/dL     eGFR 38 (L) >60 mL/min/1.73m*2     Calcium 8.5 (L) 8.6 - 10.3 mg/dL     Albumin 3.0 (L) 3.4 - 5.0 g/dL     Alkaline Phosphatase 89 33 - 136 U/L     Total  Protein 5.6 (L) 6.4 - 8.2 g/dL     AST 8 (L) 9 - 39 U/L     Bilirubin, Total 0.3 0.0 - 1.2 mg/dL     ALT 25 7 - 45 U/L   Protime-INR   Result Value Ref Range     Protime 11.6 9.8 - 12.8 seconds     INR 1.0 0.9 - 1.1   aPTT   Result Value Ref Range     aPTT 30 27 - 38 seconds   Lactate   Result Value Ref Range     Lactate 3.6 (H) 0.4 - 2.0 mmol/L   Type And Screen   Result Value Ref Range     ABO TYPE O       Rh TYPE POS       ANTIBODY SCREEN NEG     Troponin I, High Sensitivity   Result Value Ref Range     Troponin I, High Sensitivity 17 (H) 0 - 13 ng/L   B-Type Natriuretic Peptide   Result Value Ref Range      (H) 0 - 99 pg/mL   Blood Gas Venous   Result Value Ref Range     POCT pH, Venous 7.30 (L) 7.33 - 7.43 pH     POCT pCO2, Venous 64 (H) 41 - 51 mm Hg     POCT pO2, Venous 36 35 - 45 mm Hg     POCT SO2, Venous 61 45 - 75 %     POCT Oxy Hemoglobin, Venous 59.0 45.0 - 75.0 %     POCT Base Excess, Venous 3.2 (H) -2.0 - 3.0 mmol/L     POCT HCO3 Calculated, Venous 31.5 (H) 22.0 - 26.0 mmol/L     Patient Temperature 37.0 degrees Celsius     FiO2 36 %     Test Comment ICU-S     Urinalysis with Reflex Microscopic and Culture   Result Value Ref Range     Color, Urine Yellow Straw, Yellow     Appearance, Urine Hazy (N) Clear     Specific Gravity, Urine 1.015 1.005 - 1.035     pH, Urine 5.0 5.0, 5.5, 6.0, 6.5, 7.0, 7.5, 8.0     Protein, Urine NEGATIVE NEGATIVE mg/dL     Glucose, Urine NEGATIVE NEGATIVE mg/dL     Blood, Urine MODERATE (2+) (A) NEGATIVE     Ketones, Urine NEGATIVE NEGATIVE mg/dL     Bilirubin, Urine NEGATIVE NEGATIVE     Urobilinogen, Urine <2.0 <2.0 mg/dL     Nitrite, Urine NEGATIVE NEGATIVE     Leukocyte Esterase, Urine LARGE (3+) (A) NEGATIVE   Microscopic Only, Urine   Result Value Ref Range     WBC, Urine >50 (A) 1-5, NONE /HPF     WBC Clumps, Urine FEW Reference range not established. /HPF     RBC, Urine >20 (A) NONE, 1-2, 3-5 /HPF     Squamous Epithelial Cells, Urine 1-9 (SPARSE) Reference  range not established. /HPF     Transitional Epithelial Cells, Urine 1-2 (FEW) Reference range not established. /HPF     Bacteria, Urine 1+ (A) NONE SEEN /HPF     Budding Yeast, Urine PRESENT (A) NONE /HPF     Mucus, Urine 1+ Reference range not established. /LPF     Hyaline Casts, Urine 2+ (A) NONE /LPF            Assessment/Plan   Danielle is a 73-year-old female patient presenting to emergency department for evaluation of shortness of breath and volume overload.  Patient found to have a BNP of 116, elevated troponin, elevated lactate, elevated WBCs.  Imaging showing moderate left pleural effusion with left basilar atelectasis and mild right basilar atelectasis.  Last echocardiogram on file 2/27/2023 with an EF of 60 to 65%.  Patient having new oxygen requirements moving from 2 L/min to 4 L/min.  Patient currently receiving IV Zosyn via PICC line at [facility] for recurrent UTI.  Patient found to be UTI positive, no culture sent due to being on antibiotics chronically.  Patient given IV Lasix for volume overload in ED.  Consult placed to cardiology.  Admission for further medical management.     Acute decompensated heart failure  Admit to telemetry per Dr. Carcamo  Intake and output  See imaging results above  Daily weight  Lasix IV 40 mg in ED  Consult cardiology and appreciate input  Low-sodium diet  Fluid restriction  Chronic Cruz catheter     Diabetes/hypothyroidism/hypertension/A-fib/hyperlipidemia     Diabetic diet  ISS  Hypoglycemia protocol  Hold home oral antidiabetic medications when med rec is complete  Continue home medications  Continue home Eliquis when med rec is complete     DVT PPx  Continue home Eliquis when med rec is complete  SCDs  Activity as tolerated     I spent 30 minutes in the professional and overall care of this patient.  Patient fully evaluated on November, plan as above.                      Revision History          Patient fully evaluated on November 11.  Debrox for  bilateral ear cerumen impactions.  Continue to diurese patient and recheck labs in AM.     Patient fully evaluated on November 12.  Continue aggressive diuresis.  BUN and creatinine are stable.  Recheck labs in AM.    Patient fully evaluated on November 13.  Rise in BUN and creatinine noted and patient will transition to oral diuretics.  Recheck labs in AM.  Recheck chemistries in a.m. to check for renal function  Patient fully evaluated on November 14 and rise in BUN and creatinine noted.  Diuretics have been held as well as ARB's.  Nephrology consultation obtained.  Recheck labs in a.m.    Patient fully evaluated on November 15.  Continue to monitor BUN and creatinine diuretics been held.    Patient fully evaluated on November 16.  Continue to monitor H&H which drop was noted.  Eliquis has been held.  Continued acute kidney injury noted and appreciate nephrology consultation.  Recheck labs in AM.    Patient fully evaluated on November 17.  IV Lasix drip initiated for diuresis.  Patient has significant end-stage renal disease.    Patient fully evaluated on November 18 and consideration for dialysis.  Permacath to be placed today by interventional radiology.  Continue to monitor lab work and monitor for further fluid overload.  Patient has 2+ edema with significant anasarca.  Thien Carcamo MD

## 2023-11-18 NOTE — PROGRESS NOTES
"Danielle Parisi is a 73 y.o. female on day 8 of admission presenting with Acute decompensated heart failure (CMS/HCC).    Subjective   Patient renal chemistries continue to deteriorate.  Patient had minimal renal response with low output with the furosemide drip and continued to get a worsening congestive heart failure and uremia.  Patient continues to be more confused because of uremic encephalopathy  Her renal chemistries had deteriorated as well with a BUN today is 91 and creatinine 4.14 with a GFR dropping to 11 patient have uncontrolled blood sugars and sliding scale is going to be adjusted Hemoglobin continue to be in the 7 range is 7.3 today.  Discussed case with patient attending and nurse patient will require renal replacement therapy we will try to obtain temporary dialysis catheter placement today.  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  Pupils react to light and accommodation  Neck; no hepatojugular reflux no jugular venous distention no bruit no thyromegaly no lymphadenopathy  Lungs she had crackles on inspiration left more than right with diminished air entry in the left side no wheezing  Heart; irregular rate there is no gallop no rubs  Abdomen; active peristalsis no rebound or guarding abdomen is obese no fluid wave  Extremities; severe lymphedema progressing (anasarca with ulceration heal in the right leg;  There is erythema and tenderness in the lateral aspect of the left leg suggesting cellulitis  Pulses are decreased because of the edema of the legs    Last Recorded Vitals  Blood pressure 104/55, pulse 96, temperature 35 °C (95 °F), temperature source Temporal, resp. rate 18, height 1.702 m (5' 7.01\"), weight (!) 168 kg (370 lb 6 oz), SpO2 98 %.  Intake/Output last 3 Shifts:  I/O last 3 completed shifts:  In: 1053 (7 mL/kg) [P.O.:430; I.V.:473 " (3.2 mL/kg); IV Piggyback:150]  Out: 100 (0.7 mL/kg) [Urine:100 (0 mL/kg/hr)]  Dosing Weight: 150 kg     Relevant Results  Scheduled medications  busPIRone, 5 mg, oral, BID  carbamide peroxide, 10 drop, Each Ear, BID  [Held by provider] dilTIAZem CD, 240 mg, oral, Daily  famotidine, 20 mg, oral, Daily  [Held by provider] furosemide, 40 mg, intravenous, q12h  gabapentin, 100 mg, oral, Daily  insulin lispro, 0-15 Units, subcutaneous, TID with meals  lactobacillus acidophilus, 1 tablet, oral, Daily  levothyroxine, 100 mcg, oral, Once per day on Mon  levothyroxine, 50 mcg, oral, Once per day on Sun Tue Wed Thu Fri Sat  metoprolol tartrate, 25 mg, oral, BID  piperacillin-tazobactam, 2.25 g, intravenous, q6h  polyethylene glycol, 17 g, oral, Daily  potassium chloride CR, 20 mEq, oral, Daily  predniSONE, 20 mg, oral, Daily  [Held by provider] torsemide, 20 mg, oral, Daily  triamcinolone, , Topical, BID      Continuous medications  furosemide, 20 mg/hr, Last Rate: 20 mg/hr (11/18/23 1117)    PRN medications  PRN medications: acetaminophen, bisacodyl, dextrose 10 % in water (D10W), dextrose, glucagon, LORazepam, nystatin, oxygen, sodium zirconium cyclosilicate, traMADol, zinc oxide   Results for orders placed or performed during the hospital encounter of 11/09/23 (from the past 24 hour(s))   POCT GLUCOSE   Result Value Ref Range    POCT Glucose 428 (H) 74 - 99 mg/dL   POCT GLUCOSE   Result Value Ref Range    POCT Glucose 350 (H) 74 - 99 mg/dL   POCT GLUCOSE   Result Value Ref Range    POCT Glucose 323 (H) 74 - 99 mg/dL   Magnesium   Result Value Ref Range    Magnesium 2.08 1.60 - 2.40 mg/dL   CBC   Result Value Ref Range    WBC 16.6 (H) 4.4 - 11.3 x10*3/uL    nRBC 1.0 (H) 0.0 - 0.0 /100 WBCs    RBC 2.70 (L) 4.00 - 5.20 x10*6/uL    Hemoglobin 7.3 (L) 12.0 - 16.0 g/dL    Hematocrit 24.8 (L) 36.0 - 46.0 %    MCV 92 80 - 100 fL    MCH 27.0 26.0 - 34.0 pg    MCHC 29.4 (L) 32.0 - 36.0 g/dL    RDW 18.2 (H) 11.5 - 14.5 %     Platelets 218 150 - 450 x10*3/uL   Comprehensive Metabolic Panel   Result Value Ref Range    Glucose 364 (H) 74 - 99 mg/dL    Sodium 133 (L) 136 - 145 mmol/L    Potassium 5.6 (H) 3.5 - 5.3 mmol/L    Chloride 93 (L) 98 - 107 mmol/L    Bicarbonate 27 21 - 32 mmol/L    Anion Gap 19 10 - 20 mmol/L    Urea Nitrogen 91 (HH) 6 - 23 mg/dL    Creatinine 4.14 (H) 0.50 - 1.05 mg/dL    eGFR 11 (L) >60 mL/min/1.73m*2    Calcium 7.7 (L) 8.6 - 10.3 mg/dL    Albumin 3.2 (L) 3.4 - 5.0 g/dL    Alkaline Phosphatase 91 33 - 136 U/L    Total Protein 5.2 (L) 6.4 - 8.2 g/dL    AST 6 (L) 9 - 39 U/L    Bilirubin, Total 0.3 0.0 - 1.2 mg/dL    ALT 22 7 - 45 U/L   Phosphorus   Result Value Ref Range    Phosphorus 10.4 (H) 2.5 - 4.9 mg/dL   POCT GLUCOSE   Result Value Ref Range    POCT Glucose 364 (H) 74 - 99 mg/dL       XR chest 1 view    Result Date: 11/15/2023  Interpreted By:  Johann Wood, STUDY: XR CHEST 1 VIEW;  11/14/2023 8:44 pm   INDICATION: Signs/Symptoms:Atelectasis.   COMPARISON: 11/09/2023   ACCESSION NUMBER(S): [id number]   ORDERING CLINICIAN: LINH RONQUILLO   FINDINGS: There is near-complete opacification of the left hemithorax with several air bronchograms centrally, and only aeration remaining at the apex. This has progressed from the prior exam and most likely due to an effusion with underlying consolidation. There is volume loss with shift of the heart mediastinum to the left. There is also blunting at the right cardio hepatic angle since the prior exam indicating an additional small right effusion.   ABDOMEN AND OTHER FINDINGS: No remarkable upper abdominal findings.   BONES: No acute osseous changes.       1.  Airspace disease as described.   Signed by: Johann Wood 11/15/2023 9:33 AM Dictation workstation:   [id number]    US renal complete    Result Date: 11/15/2023  Interpreted By:  Johann Wood, STUDY: US RENAL COMPLETE;  11/14/2023 8:42 pm   INDICATION: Signs/Symptoms:Renal failure.   COMPARISON: None.    ACCESSION NUMBER(S): [id number]   ORDERING CLINICIAN: LINH RONQUILLO   TECHNIQUE: Multiple images of the kidneys were obtained  , with color Doppler for blood flow.  It is noted the exam was limited due to patient habitus , limited mobility and continuous respiration.   FINDINGS: RIGHT KIDNEY: The right kidney measures 9.6 cm in length.  There is mild thinning of the renal cortex at the lower pole indicating mild atrophy. The remaining cortex appears maintained in homogeneous..  No hydronephrosis or evidence of nephrolithiasis. Color Doppler demonstrates renal blood flow.   LEFT KIDNEY: The left kidney could not be visualized, presumably secondary to limiting factors as above.   BLADDER: Not visualized, also probably due to limiting factors and under distension.   OTHER FINDINGS: None significant.       Mild right renal cortical atrophy at the lower pole, otherwise no hydronephrosis. The left kidney and bladder were not visualized.   Signed by: Johann Wood 11/15/2023 8:39 AM Dictation workstation:   [id number]    XR chest 1 view    Result Date: 11/10/2023  STUDY: Chest Radiograph;  11/09/2023 10:56 PM INDICATION: Shortness of breath. COMPARISON: XR chest 03/16/2023, 03/15/2023.  ACCESSION NUMBER(S): [id number] ORDERING CLINICIAN: SACHIN WELDON TECHNIQUE:  Frontal chest was obtained at 2229 hours. FINDINGS: CARDIOMEDIASTINAL SILHOUETTE: Cardiomediastinal silhouette is markedly enlarged and stable in size and configuration.  LUNGS: Left-sided PICC line is noted terminating in the region of the proximal SVC/left subclavian junction.  Mild right basilar atelectasis.  Moderate left pleural effusion is suggested with underlying left basilar atelectasis and/or consolidation.  ABDOMEN: No remarkable upper abdominal findings.  BONES: No acute osseous changes.    Moderate left pleural effusion with left basilar atelectasis and/or consolidation.  Mild right basilar atelectasis. Signed by Nakul Rucker MD                             Assessment/Plan   #1 acute kidney injury superimposed on chronic kidney disease  #2 eosinophiluria suggesting allergic interstitial nephritis  #3 acute hyperkalemia  #4 GI bleed  #5 anasarca  #6 secondary hyperparathyroidism of renal origin with hyperphosphatemia ( PTH was 329)  #7 acute rhabdomyolysis  #8 pyocystitis  #9 atrial fibrillation  #10 hyperuricemia  #11 nonnephrotic range proteinuria  #12 cellulitis of left leg  #13 uremic encephalopathy   Plan  Discussed with attending nurse and patient patient agreed to have renal replacement therapy.  Procedure explained to the patient we will consult interventional radiology for temporary catheter placement and proceed with dialysis today.    Thank you very much for allowing to participate in the care of this patient  Troy Patricio MD

## 2023-11-18 NOTE — PROCEDURES
Interventional Radiology Brief Postprocedure Note    Attending: Raza Redd MD    Assistant:   Staff Role   Keisha Fong, RN Radiology Nurse   Raza Redd MD Radiologist       Diagnosis:   1. Non-traumatic rhabdomyolysis  Infusion Appointment Request      2. Acute decompensated heart failure (CMS/[facility])        3. Hypoxia        4. Chronic atrial fibrillation (CMS/[facility])        5. Urinary tract infection associated with indwelling urethral catheter, initial encounter (CMS/[facility])        6. CONOR (acute kidney injury) (CMS/[facility])  Infusion Appointment Request          Description of procedure: non-tunneled right internal jugular trialysis catheter. Due to patient body habitus and anxiety, procedure performed with ultrasound on patient bed without flouroscopy.     Timeout:  Yes    Procedure Area: Procedure Area     Anesthesia:   Local/Topical    Complications: None    Estimated Blood Loss: none    Medications (Filter: Administrations occurring from 1322 to 1322 on 11/18/23) As of 11/18/23 1322      None          No specimens collected      See detailed result report with images in PACS.    The patient tolerated the procedure well without incident or complication and is in stable condition.      The patient is a 89y Female complaining of

## 2023-11-18 NOTE — CONSULTS
Nutrition Note  Reason for Assessment  Reason for Assessment: Dietitian discretion      Recommendation(s):  Continue cardiac diet , adding low K+ due to consistently elevated K+ Not able to add supplement due to strict fluid restriction        Assessment    Subjective Data  Food and Nutrient History: Intake records not available.  Pt is eating fairly well per nursing.  Pt was not available for interview.      Objective Data  Per Flowsheet Percent Meal intake:    Dietary Orders (From admission, onward)       Start     Ordered    11/17/23 1409  Adult diet Cardiac; 70 gm fat; 2 - 3 grams Sodium; 1200 mL fluid; Potassium restricted 2 gm (50mEq)  Diet effective now        Question Answer Comment   Diet type Cardiac    Fat restriction: 70 gm fat    Sodium restriction: 2 - 3 grams Sodium    Dietary fluid restriction / 24h: 1200 mL fluid    Other restriction(s): Potassium restricted 2 gm (50mEq)        11/17/23 1409    11/10/23 0455  May Participate in Room Service  Once        Question:  .  Answer:  Yes    11/10/23 0454                  Independent after set-up  Results from last 7 days   Lab Units 11/17/23  0538 11/16/23  0519 11/15/23  1513 11/15/23  0537   GLUCOSE mg/dL 399* 352* 313* 358*   SODIUM mmol/L 133* 134* 138 138   POTASSIUM mmol/L 5.6* 5.8* 5.4* 5.4*   CHLORIDE mmol/L 94* 96* 99 97*   CO2 mmol/L 29 29 30 30   BUN mg/dL 90* 81* 80* 78*   CREATININE mg/dL 3.99* 3.62* 3.67* 3.41*   EGFR mL/min/1.73m*2 11* 13* 13* 14*   CALCIUM mg/dL 7.6* 7.5* 7.5* 7.6*   PHOSPHORUS mg/dL  --  9.8* 10.2* 10.0*   MAGNESIUM mg/dL  --   --   --  2.09     Lab Results   Component Value Date    HGBA1C 6.5 (A) 02/14/2023    HGBA1C 6.4 (A) 11/14/2022    HGBA1C 6.8 (A) 08/25/2022     Results from last 7 days   Lab Units 11/17/23  1915 11/17/23  1617 11/17/23  1233 11/17/23  0619 11/16/23  2040 11/16/23  1649 11/16/23  1125 11/16/23  0600   POCT GLUCOSE mg/dL 323* 350* 428* 325* 308* 269* 375* 357*     GI per  "flowsheet:  Gastrointestinal  Gastrointestinal (WDL): Within Defined Limits  Abdomen Inspection: Soft, Nondistended, No ascites  Abdominal Tenderness: Soft, No guarding, Nontender  Bowel Sounds: All quadrants  Bowel Sounds (All Quadrants): Active  Last BM Date: 11/16/23  Bowel Incontinence: Yes  Stool Appearance: Loose  Stool Color: Brown  Last bowel movement documented: 11/16/23  PMH: CHF: A FIB; chronic lymphedema; CONOR; on CKD; anemia; DM: hypothyroid; HTN  Allergies: Penicillins and Sulfa (sulfonamide antibiotics)     Anthropometrics:  Height: 170.2 cm (5' 7.01\")  Weight: (!) 168 kg (370 lb 6 oz)  BMI (Calculated): 57.99  IBW: 65 kg  %IBW: 260 %                    Estimated Nutritional Needs:  Method for Estimating Needs: 4341-3251   26-30 letitia kg of IBW    Method for Estimating Needs: 65-78  1-1.2 gm kg of IBW    Method for Estimating Needs: 9224-5600 as medically indicated   20-30 ml kg of IBW    Nutrition Focused Physical Findings:   Orbital Fat Pads:  (Pt was not available)         Edema  Edema Location: chronic lymphedema             Nutrition Diagnosis        Patient has Nutrition Diagnosis: Yes  Ongoing  Nutrition Diagnosis 1: Obese  Related to (1): excessive energy intake and physical inactivity  As Evidenced by (1): obesity grade III   BMI  57.9       Increased nutrient needs  Diagnosis Status (2): New  Related to (2): physiological causes  As Evidenced by (2): wound healing needs          Plan    Interventions        Individualized Nutrition Prescription Provided for : Continue cardiac diet , adding low K+ due to consistently elevated K+   Not able to add supplement due to strict fluid restriction    Nutrition Monitoring and Evaluation   Biochemical Data, Medical Tests and Procedures  Monitoring and Evaluation Plan: Electrolyte/renal panel, Glucose/endocrine profile  Food/Nutrient Related History Monitoring  Monitoring and Evaluation Plan: Energy intake, Fluid intake, Amount of food    Progress towards " goals: Partially Met    Education Documentation  No documentation found.    Time Spent (min): 45 minutes  Last Date of Nutrition Visit: 11/17/23  Nutrition Follow-Up Needed?: 3-5 days  Follow up Comment: 11/22  MZ

## 2023-11-18 NOTE — CARE PLAN
The patient's goals for the shift include rest and comfort    The clinical goals for the shift include patient will be comfortable and refrain from complicatios    Over the shift, the patient will remain pain free and comfortable without episodes of shortness

## 2023-11-19 ENCOUNTER — APPOINTMENT (OUTPATIENT)
Dept: DIALYSIS | Facility: HOSPITAL | Age: 73
End: 2023-11-19
Payer: MEDICARE

## 2023-11-19 LAB
ALBUMIN SERPL BCP-MCNC: 3.9 G/DL (ref 3.4–5)
ANION GAP SERPL CALC-SCNC: 13 MMOL/L (ref 10–20)
BUN SERPL-MCNC: 43 MG/DL (ref 6–23)
CALCIUM SERPL-MCNC: 7.6 MG/DL (ref 8.6–10.3)
CHLORIDE SERPL-SCNC: 96 MMOL/L (ref 98–107)
CO2 SERPL-SCNC: 30 MMOL/L (ref 21–32)
CREAT SERPL-MCNC: 2.48 MG/DL (ref 0.5–1.05)
GFR SERPL CREATININE-BSD FRML MDRD: 20 ML/MIN/1.73M*2
GLUCOSE BLD MANUAL STRIP-MCNC: 208 MG/DL (ref 74–99)
GLUCOSE BLD MANUAL STRIP-MCNC: 216 MG/DL (ref 74–99)
GLUCOSE BLD MANUAL STRIP-MCNC: 236 MG/DL (ref 74–99)
GLUCOSE SERPL-MCNC: 235 MG/DL (ref 74–99)
HBV CORE AB SER QL: NONREACTIVE
HBV SURFACE AG SERPL QL IA: NONREACTIVE
HOLD SPECIMEN: NORMAL
PHOSPHATE SERPL-MCNC: 6.4 MG/DL (ref 2.5–4.9)
POTASSIUM SERPL-SCNC: 4.3 MMOL/L (ref 3.5–5.3)
SODIUM SERPL-SCNC: 135 MMOL/L (ref 136–145)

## 2023-11-19 PROCEDURE — 2500000004 HC RX 250 GENERAL PHARMACY W/ HCPCS (ALT 636 FOR OP/ED): Performed by: INTERNAL MEDICINE

## 2023-11-19 PROCEDURE — 2500000001 HC RX 250 WO HCPCS SELF ADMINISTERED DRUGS (ALT 637 FOR MEDICARE OP): Performed by: INTERNAL MEDICINE

## 2023-11-19 PROCEDURE — 2500000001 HC RX 250 WO HCPCS SELF ADMINISTERED DRUGS (ALT 637 FOR MEDICARE OP): Performed by: NURSE PRACTITIONER

## 2023-11-19 PROCEDURE — 2500000004 HC RX 250 GENERAL PHARMACY W/ HCPCS (ALT 636 FOR OP/ED): Performed by: NURSE PRACTITIONER

## 2023-11-19 PROCEDURE — 85027 COMPLETE CBC AUTOMATED: CPT | Performed by: INTERNAL MEDICINE

## 2023-11-19 PROCEDURE — 86704 HEP B CORE ANTIBODY TOTAL: CPT | Mod: PARLAB | Performed by: INTERNAL MEDICINE

## 2023-11-19 PROCEDURE — P9047 ALBUMIN (HUMAN), 25%, 50ML: HCPCS | Mod: JZ | Performed by: INTERNAL MEDICINE

## 2023-11-19 PROCEDURE — 2500000002 HC RX 250 W HCPCS SELF ADMINISTERED DRUGS (ALT 637 FOR MEDICARE OP, ALT 636 FOR OP/ED): Performed by: INTERNAL MEDICINE

## 2023-11-19 PROCEDURE — 8010000001 HC DIALYSIS - HEMODIALYSIS PER DAY

## 2023-11-19 PROCEDURE — 2500000004 HC RX 250 GENERAL PHARMACY W/ HCPCS (ALT 636 FOR OP/ED): Mod: JZ | Performed by: INTERNAL MEDICINE

## 2023-11-19 PROCEDURE — 87340 HEPATITIS B SURFACE AG IA: CPT | Mod: PARLAB | Performed by: INTERNAL MEDICINE

## 2023-11-19 PROCEDURE — 82947 ASSAY GLUCOSE BLOOD QUANT: CPT

## 2023-11-19 PROCEDURE — 2060000001 HC INTERMEDIATE ICU ROOM DAILY

## 2023-11-19 PROCEDURE — 80069 RENAL FUNCTION PANEL: CPT | Performed by: INTERNAL MEDICINE

## 2023-11-19 RX ORDER — HEPARIN SODIUM 1000 [USP'U]/ML
2000 INJECTION, SOLUTION INTRAVENOUS; SUBCUTANEOUS
Status: CANCELLED | OUTPATIENT
Start: 2023-11-20

## 2023-11-19 RX ORDER — PARICALCITOL 2 UG/ML
4 INJECTION, SOLUTION INTRAVENOUS
Status: DISCONTINUED | OUTPATIENT
Start: 2023-11-20 | End: 2023-11-22 | Stop reason: HOSPADM

## 2023-11-19 RX ORDER — MIDODRINE HYDROCHLORIDE 10 MG/1
10 TABLET ORAL 3 TIMES DAILY
Status: DISCONTINUED | OUTPATIENT
Start: 2023-11-19 | End: 2023-11-22 | Stop reason: HOSPADM

## 2023-11-19 RX ORDER — ALBUMIN HUMAN 250 G/1000ML
12.5 SOLUTION INTRAVENOUS
Status: CANCELLED | OUTPATIENT
Start: 2023-11-19

## 2023-11-19 RX ORDER — HEPARIN SODIUM 1000 [USP'U]/ML
2000 INJECTION, SOLUTION INTRAVENOUS; SUBCUTANEOUS
Status: DISCONTINUED | OUTPATIENT
Start: 2023-11-20 | End: 2023-11-22 | Stop reason: HOSPADM

## 2023-11-19 RX ORDER — FLUDROCORTISONE ACETATE 0.1 MG/1
0.1 TABLET ORAL DAILY
Status: DISCONTINUED | OUTPATIENT
Start: 2023-11-19 | End: 2023-11-22 | Stop reason: HOSPADM

## 2023-11-19 RX ADMIN — ALBUMIN HUMAN 25 G: 0.25 SOLUTION INTRAVENOUS at 10:40

## 2023-11-19 RX ADMIN — TRIAMCINOLONE ACETONIDE: 1 OINTMENT TOPICAL at 13:28

## 2023-11-19 RX ADMIN — POLYETHYLENE GLYCOL 3350 17 G: 17 POWDER, FOR SOLUTION ORAL at 13:29

## 2023-11-19 RX ADMIN — INSULIN LISPRO 6 UNITS: 100 INJECTION, SOLUTION INTRAVENOUS; SUBCUTANEOUS at 16:22

## 2023-11-19 RX ADMIN — PREDNISONE 20 MG: 20 TABLET ORAL at 13:26

## 2023-11-19 RX ADMIN — LORAZEPAM 1 MG: 0.5 TABLET ORAL at 13:21

## 2023-11-19 RX ADMIN — HEPARIN SODIUM 2200 UNITS: 1000 INJECTION, SOLUTION INTRAVENOUS; SUBCUTANEOUS at 12:16

## 2023-11-19 RX ADMIN — Medication 1 TABLET: at 13:27

## 2023-11-19 RX ADMIN — MIDODRINE HYDROCHLORIDE 10 MG: 10 TABLET ORAL at 16:07

## 2023-11-19 RX ADMIN — BUSPIRONE HYDROCHLORIDE 5 MG: 5 TABLET ORAL at 20:41

## 2023-11-19 RX ADMIN — PIPERACILLIN SODIUM AND TAZOBACTAM SODIUM 2.25 G: 2; .25 INJECTION, SOLUTION INTRAVENOUS at 16:09

## 2023-11-19 RX ADMIN — HEPARIN SODIUM 2400 UNITS: 1000 INJECTION, SOLUTION INTRAVENOUS; SUBCUTANEOUS at 12:17

## 2023-11-19 RX ADMIN — PIPERACILLIN SODIUM AND TAZOBACTAM SODIUM 2.25 G: 2; .25 INJECTION, SOLUTION INTRAVENOUS at 01:41

## 2023-11-19 RX ADMIN — BUSPIRONE HYDROCHLORIDE 5 MG: 5 TABLET ORAL at 13:27

## 2023-11-19 RX ADMIN — TRIAMCINOLONE ACETONIDE: 1 OINTMENT TOPICAL at 21:00

## 2023-11-19 RX ADMIN — FLUDROCORTISONE ACETATE 0.1 MG: 0.1 TABLET ORAL at 16:09

## 2023-11-19 RX ADMIN — METOPROLOL TARTRATE 25 MG: 25 TABLET, FILM COATED ORAL at 13:29

## 2023-11-19 RX ADMIN — GABAPENTIN 100 MG: 100 CAPSULE ORAL at 13:26

## 2023-11-19 RX ADMIN — ALBUMIN HUMAN 25 G: 0.25 SOLUTION INTRAVENOUS at 09:34

## 2023-11-19 RX ADMIN — INSULIN LISPRO 6 UNITS: 100 INJECTION, SOLUTION INTRAVENOUS; SUBCUTANEOUS at 13:29

## 2023-11-19 RX ADMIN — FAMOTIDINE 20 MG: 20 TABLET ORAL at 13:26

## 2023-11-19 RX ADMIN — SODIUM CHLORIDE 250 ML: 9 INJECTION, SOLUTION INTRAVENOUS at 12:00

## 2023-11-19 RX ADMIN — METOPROLOL TARTRATE 25 MG: 25 TABLET, FILM COATED ORAL at 20:42

## 2023-11-19 RX ADMIN — Medication 10 DROP: at 13:28

## 2023-11-19 RX ADMIN — TRAMADOL HYDROCHLORIDE 50 MG: 50 TABLET, COATED ORAL at 04:13

## 2023-11-19 RX ADMIN — PIPERACILLIN SODIUM AND TAZOBACTAM SODIUM 2.25 G: 2; .25 INJECTION, SOLUTION INTRAVENOUS at 20:41

## 2023-11-19 RX ADMIN — TRAMADOL HYDROCHLORIDE 50 MG: 50 TABLET, COATED ORAL at 21:41

## 2023-11-19 RX ADMIN — MIDODRINE HYDROCHLORIDE 10 MG: 10 TABLET ORAL at 20:41

## 2023-11-19 ASSESSMENT — PAIN SCALES - PAIN ASSESSMENT IN ADVANCED DEMENTIA (PAINAD)
TOTALSCORE: 7
NEGVOCALIZATION: REPEATED TROUBLED CALLING OUT, LOUD MOANING/GROANING, CRYING
CONSOLABILITY: DISTRACTED OR REASSURED BY VOICE/TOUCH
BODYLANGUAGE: TENSE, DISTRESSED PACING, FIDGETING
FACIALEXPRESSION: FACIAL GRIMACING
BREATHING: OCCASIONAL LABORED BREATHING, SHORT PERIOD OF HYPERVENTILATION

## 2023-11-19 ASSESSMENT — PAIN SCALES - GENERAL
PAINLEVEL_OUTOF10: 0 - NO PAIN
PAINLEVEL_OUTOF10: 10 - WORST POSSIBLE PAIN

## 2023-11-19 ASSESSMENT — PAIN - FUNCTIONAL ASSESSMENT: PAIN_FUNCTIONAL_ASSESSMENT: 0-10

## 2023-11-19 NOTE — PROGRESS NOTES
"Danielle Parisi is a 73 y.o. female on day 9 of admission presenting with Acute decompensated heart failure (CMS/HCC).    Subjective   Patient had for dialysis yesterday undergoing dialysis today continue to have hypotension which compromises ability to do ultrafiltration.  We will add ProAmatine  Chest x-ray obtained after dialysis catheter placement disclosed complete opacification of the left hemithorax and right lung pneumonitis  Renal chemistries are pending.  Patient continued to have elevation of the blood glucose insulin sliding scales is going to be updated and adjusted.  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  Pupils react to light and accommodation  Neck; no hepatojugular reflux no jugular venous distention no bruit no thyromegaly no lymphadenopathy  Lungs she had crackles on inspiration left more than right with diminished air entry in the left side no wheezing  Heart; irregular rate there is no gallop no rubs  Abdomen; active peristalsis no rebound or guarding abdomen is obese no fluid wave  Extremities; severe lymphedema progressing (anasarca with ulceration heal in the right leg;  There is erythema and tenderness in the lateral aspect of the left leg suggesting cellulitis  Pulses are decreased because of the edema of the legs    Last Recorded Vitals  Blood pressure 102/59, pulse 75, temperature 36.7 °C (98.1 °F), temperature source Skin, resp. rate 20, height 1.702 m (5' 7.01\"), weight (!) 168 kg (370 lb 6 oz), SpO2 99 %.  Intake/Output last 3 Shifts:  I/O last 3 completed shifts:  In: 2112.9 (14.1 mL/kg) [P.O.:430; I.V.:1032.9 (6.9 mL/kg); Other:400; IV Piggyback:250]  Out: 1100 (7.3 mL/kg) [Urine:100 (0 mL/kg/hr); Other:1000]  Dosing Weight: 150 kg     Relevant Results  Scheduled medications  busPIRone, 5 mg, oral, BID  carbamide peroxide, " 10 drop, Each Ear, BID  [Held by provider] dilTIAZem CD, 240 mg, oral, Daily  famotidine, 20 mg, oral, Daily  [Held by provider] furosemide, 40 mg, intravenous, q12h  gabapentin, 100 mg, oral, Daily  [START ON 11/20/2023] heparin, 2,000 Units, intravenous, After Dialysis  heparin, 2,200 Units, intra-catheter, After Dialysis  heparin, 2,400 Units, intra-catheter, After Dialysis  insulin lispro, 0-15 Units, subcutaneous, TID with meals  [START ON 11/20/2023] iron sucrose, 50 mg, intravenous, After Dialysis  lactobacillus acidophilus, 1 tablet, oral, Daily  levothyroxine, 100 mcg, oral, Once per day on Mon  levothyroxine, 50 mcg, oral, Once per day on Sun Tue Wed Thu Fri Sat  metoprolol tartrate, 25 mg, oral, BID  [START ON 11/20/2023] paricalcitol, 4 mcg, intravenous, After Dialysis  piperacillin-tazobactam, 2.25 g, intravenous, q6h  polyethylene glycol, 17 g, oral, Daily  potassium chloride CR, 20 mEq, oral, Daily  predniSONE, 20 mg, oral, Daily  sodium chloride, 200 mL, intravenous, After Dialysis  sodium chloride, 250 mL, intravenous, After Dialysis  [Held by provider] torsemide, 20 mg, oral, Daily  triamcinolone, , Topical, BID      Continuous medications     PRN medications  PRN medications: acetaminophen, albumin human, bisacodyl, dextrose 10 % in water (D10W), dextrose, glucagon, LORazepam, nystatin, oxygen, sodium chloride, sodium zirconium cyclosilicate, traMADol, zinc oxide   Results for orders placed or performed during the hospital encounter of 11/09/23 (from the past 24 hour(s))   POCT GLUCOSE   Result Value Ref Range    POCT Glucose 351 (H) 74 - 99 mg/dL   POCT GLUCOSE   Result Value Ref Range    POCT Glucose 177 (H) 74 - 99 mg/dL   POCT GLUCOSE   Result Value Ref Range    POCT Glucose 235 (H) 74 - 99 mg/dL   Lavender Top   Result Value Ref Range    Extra Tube Hold for add-ons.    POCT GLUCOSE   Result Value Ref Range    POCT Glucose 236 (H) 74 - 99 mg/dL       XR chest 1 view    Result Date:  11/15/2023  Interpreted By:  Johann Wood, STUDY: XR CHEST 1 VIEW;  11/14/2023 8:44 pm   INDICATION: Signs/Symptoms:Atelectasis.   COMPARISON: 11/09/2023   ACCESSION NUMBER(S): [id number]   ORDERING CLINICIAN: LINH RONQUILLO   FINDINGS: There is near-complete opacification of the left hemithorax with several air bronchograms centrally, and only aeration remaining at the apex. This has progressed from the prior exam and most likely due to an effusion with underlying consolidation. There is volume loss with shift of the heart mediastinum to the left. There is also blunting at the right cardio hepatic angle since the prior exam indicating an additional small right effusion.   ABDOMEN AND OTHER FINDINGS: No remarkable upper abdominal findings.   BONES: No acute osseous changes.       1.  Airspace disease as described.   Signed by: Johann Wood 11/15/2023 9:33 AM Dictation workstation:   [id number]    US renal complete    Result Date: 11/15/2023  Interpreted By:  Johann Wood, STUDY: US RENAL COMPLETE;  11/14/2023 8:42 pm   INDICATION: Signs/Symptoms:Renal failure.   COMPARISON: None.   ACCESSION NUMBER(S): [id number]   ORDERING CLINICIAN: LINH RONQUILLO   TECHNIQUE: Multiple images of the kidneys were obtained  , with color Doppler for blood flow.  It is noted the exam was limited due to patient habitus , limited mobility and continuous respiration.   FINDINGS: RIGHT KIDNEY: The right kidney measures 9.6 cm in length.  There is mild thinning of the renal cortex at the lower pole indicating mild atrophy. The remaining cortex appears maintained in homogeneous..  No hydronephrosis or evidence of nephrolithiasis. Color Doppler demonstrates renal blood flow.   LEFT KIDNEY: The left kidney could not be visualized, presumably secondary to limiting factors as above.   BLADDER: Not visualized, also probably due to limiting factors and under distension.   OTHER FINDINGS: None significant.       Mild right renal  cortical atrophy at the lower pole, otherwise no hydronephrosis. The left kidney and bladder were not visualized.   Signed by: Johann Wood 11/15/2023 8:39 AM Dictation workstation:   [id number]    XR chest 1 view    Result Date: 11/10/2023  STUDY: Chest Radiograph;  11/09/2023 10:56 PM INDICATION: Shortness of breath. COMPARISON: XR chest 03/16/2023, 03/15/2023.  ACCESSION NUMBER(S): [id number] ORDERING CLINICIAN: SACHIN WELDON TECHNIQUE:  Frontal chest was obtained at 2229 hours. FINDINGS: CARDIOMEDIASTINAL SILHOUETTE: Cardiomediastinal silhouette is markedly enlarged and stable in size and configuration.  LUNGS: Left-sided PICC line is noted terminating in the region of the proximal SVC/left subclavian junction.  Mild right basilar atelectasis.  Moderate left pleural effusion is suggested with underlying left basilar atelectasis and/or consolidation.  ABDOMEN: No remarkable upper abdominal findings.  BONES: No acute osseous changes.    Moderate left pleural effusion with left basilar atelectasis and/or consolidation.  Mild right basilar atelectasis. 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

## 2023-11-19 NOTE — CARE PLAN
The patient's goals for the shift include rest and comfort    The clinical goals for the shift include patient will be able to sleep throughout night    Over the shift, the patient will remain comfortable and refrain from complications

## 2023-11-19 NOTE — PROGRESS NOTES
Danielle Parisi is a 73 y.o. female on day 9 of admission presenting with Acute decompensated heart failure (CMS/HCC).      Subjective   Patient fully evaluated on November 11 and clinically improving.       Objective     Last Recorded Vitals  /59   Pulse 70   Temp 36.7 °C (98.1 °F) (Skin)   Resp 20   Wt (!) 168 kg (370 lb 6 oz)   SpO2 99%   Intake/Output last 3 Shifts:    Intake/Output Summary (Last 24 hours) at 11/19/2023 1449  Last data filed at 11/19/2023 1300  Gross per 24 hour   Intake 2450 ml   Output 1100 ml   Net 1350 ml         Admission Weight  Weight: 150 kg (330 lb) (11/09/23 2215)    Daily Weight  11/17/23 : (!) 168 kg (370 lb 6 oz)    Image Results  XR chest 1 view  Narrative: Interpreted By:  Radha Peres,   STUDY:  XR CHEST 1 VIEW;  11/18/2023 1:35 pm      INDICATION:  Signs/Symptoms:post dialysis catheter.      COMPARISON:  11/14/2023      ACCESSION NUMBER(S):  [id number]      ORDERING CLINICIAN:  MERI RANKIN      FINDINGS:  CARDIOMEDIASTINAL SILHOUETTE:  Cardiomediastinal silhouette is normal in size and configuration.  There is a right subclavian large bore catheter with the tip in the  superior vena cava. There is a left PICC line with the tip in the  left brachiocephalic vein.      LUNGS:  Again there is complete opacification of the left hemithorax which  could represent collapse of the left lung or large effusion. There is  mediastinal shift to the left suggesting volume loss at the left  lung. There is progressive consolidation at the right lung base.      ABDOMEN:  No remarkable upper abdominal findings.          BONES:  No acute osseous changes.      Impression: 1. Complete opacification left hemithorax, unchanged compared to  11/14/2023, likely left lung collapse.  2. Progressive pneumonia right lung base.      MACRO:  None      Signed by: Radha Peres 11/18/2023 1:46 PM  Dictation workstation:   [id number]      Physical Exam    Relevant Results                Assessment/Plan   This patient currently has cardiac telemetry ordered; if you would like to modify or discontinue the telemetry order, click here to go to the orders activity to modify/discontinue the order.              Principal Problem:    Acute decompensated heart failure (CMS/[facility])          Thien Carcamo MD  Physician  Internal Medicine     H&P      Addendum     Date of Service: 11/10/2023  5:38 AM     Addendum       Expand All Collapse All    History Of Present Illness  Danielle Parisi is a 73 y.o. female presenting to emergency department from skilled nursing facility for evaluation of increasing shortness of breath and volume overload.  Patient has a PICC line and is on IV Zosyn for recurrent UTI.  Over the past few days she has had a worsening shortness of breath.  She does admit to increasing lower extremity edema.  Patient is basically bedbound, denies fever but does admit to a cough.  Patient denies recent illness, nausea, vomiting, diarrhea.     In ED, , glucose 183, BUN 59, creatinine 1.46, GFR 38, AST 8, lactate 3.6 with a repeat pending.  WBCs 14.6, hemoglobin 8.3, hematocrit 29.7, troponin 17 with a repeat pending.  Chest x-ray completed showing moderate left pleural effusion with left basilar atelectasis, mild right basilar atelectasis per radiology review.  Last echocardiogram on file 2/27/2023 showing an ejection fraction of 60 to 65% with a dilated right atrium and severe tricuspid regurgitation.  EKG showing A-fib at a rate of 90 without ST elevation or depression per ER physician review.  Patient given Lasix IV in ED.  Consult placed to cardiology.  Patient admitted to telemetry under the care of Dr. Carcamo who will continue to follow.  I was asked to do H&P and place initial admission orders.     Past Medical History  Hypertension, A-fib on Eliquis, CONOR, hypothyroidism, lymphedema, diabetes, hyperlipidemia, history of DVT, obesity     Surgical History  Patient denies     Social  "History  She has no history on file for tobacco use, alcohol use, and drug use.     Family History  Reviewed and noncontributory     Allergies  Penicillins and Sulfa (sulfonamide antibiotics)     Review of Systems  A 10 point review of systems was completed and negative except what is listed in HPI  Physical Exam  Constitutional:       Appearance: She is obese.   HENT:      Mouth/Throat:      Mouth: Mucous membranes are dry.      Pharynx: Oropharynx is clear.   Eyes:      Pupils: Pupils are equal, round, and reactive to light.   Cardiovascular:      Rate and Rhythm: Rhythm irregularly irregular.   Pulmonary:      Breath sounds: Rales present.   Abdominal:      General: Bowel sounds are normal.   Musculoskeletal:         General: Normal range of motion.      Cervical back: Normal range of motion.   Skin:     General: Skin is warm and dry.      Capillary Refill: Capillary refill takes less than 2 seconds.   Neurological:      General: No focal deficit present.      Mental Status: She is alert. Mental status is at baseline.   Psychiatric:         Mood and Affect: Mood normal.         Behavior: Behavior normal.            Last Recorded Vitals  Blood pressure 102/56, pulse 104, temperature 35.8 °C (96.4 °F), resp. rate 18, height 1.702 m (5' 7\"), weight (!) 168 kg (370 lb 6 oz), SpO2 97 %.     Relevant Results  XR chest 1 view     Result Date: 11/10/2023  STUDY: Chest Radiograph;  11/09/2023 10:56 PM INDICATION: Shortness of breath. COMPARISON: XR chest 03/16/2023, 03/15/2023.  ACCESSION NUMBER(S): [id number] ORDERING CLINICIAN: SACHIN WELDON TECHNIQUE:  Frontal chest was obtained at 2229 hours. FINDINGS: CARDIOMEDIASTINAL SILHOUETTE: Cardiomediastinal silhouette is markedly enlarged and stable in size and configuration.  LUNGS: Left-sided PICC line is noted terminating in the region of the proximal SVC/left subclavian junction.  Mild right basilar atelectasis.  Moderate left pleural effusion is suggested with " underlying left basilar atelectasis and/or consolidation.  ABDOMEN: No remarkable upper abdominal findings.  BONES: No acute osseous changes.     Moderate left pleural effusion with left basilar atelectasis and/or consolidation.  Mild right basilar atelectasis. Signed by Nakul Rucker MD                  Results for orders placed or performed during the hospital encounter of 11/09/23 (from the past 24 hour(s))   CBC and Auto Differential   Result Value Ref Range     WBC 14.6 (H) 4.4 - 11.3 x10*3/uL     nRBC 0.3 (H) 0.0 - 0.0 /100 WBCs     RBC 3.15 (L) 4.00 - 5.20 x10*6/uL     Hemoglobin 8.3 (L) 12.0 - 16.0 g/dL     Hematocrit 29.7 (L) 36.0 - 46.0 %     MCV 94 80 - 100 fL     MCH 26.3 26.0 - 34.0 pg     MCHC 27.9 (L) 32.0 - 36.0 g/dL     RDW 17.8 (H) 11.5 - 14.5 %     Platelets 306 150 - 450 x10*3/uL     Neutrophils % 92.8 40.0 - 80.0 %     Immature Granulocytes %, Automated 1.0 (H) 0.0 - 0.9 %     Lymphocytes % 2.6 13.0 - 44.0 %     Monocytes % 3.4 2.0 - 10.0 %     Eosinophils % 0.1 0.0 - 6.0 %     Basophils % 0.1 0.0 - 2.0 %     Neutrophils Absolute 13.55 (H) 1.60 - 5.50 x10*3/uL     Immature Granulocytes Absolute, Automated 0.15 0.00 - 0.50 x10*3/uL     Lymphocytes Absolute 0.38 (L) 0.80 - 3.00 x10*3/uL     Monocytes Absolute 0.50 0.05 - 0.80 x10*3/uL     Eosinophils Absolute 0.01 0.00 - 0.40 x10*3/uL     Basophils Absolute 0.02 0.00 - 0.10 x10*3/uL   Magnesium   Result Value Ref Range     Magnesium 1.67 1.60 - 2.40 mg/dL   Comprehensive metabolic panel   Result Value Ref Range     Glucose 183 (H) 74 - 99 mg/dL     Sodium 142 136 - 145 mmol/L     Potassium 4.0 3.5 - 5.3 mmol/L     Chloride 102 98 - 107 mmol/L     Bicarbonate 32 21 - 32 mmol/L     Anion Gap 12 10 - 20 mmol/L     Urea Nitrogen 59 (H) 6 - 23 mg/dL     Creatinine 1.46 (H) 0.50 - 1.05 mg/dL     eGFR 38 (L) >60 mL/min/1.73m*2     Calcium 8.5 (L) 8.6 - 10.3 mg/dL     Albumin 3.0 (L) 3.4 - 5.0 g/dL     Alkaline Phosphatase 89 33 - 136 U/L     Total  Protein 5.6 (L) 6.4 - 8.2 g/dL     AST 8 (L) 9 - 39 U/L     Bilirubin, Total 0.3 0.0 - 1.2 mg/dL     ALT 25 7 - 45 U/L   Protime-INR   Result Value Ref Range     Protime 11.6 9.8 - 12.8 seconds     INR 1.0 0.9 - 1.1   aPTT   Result Value Ref Range     aPTT 30 27 - 38 seconds   Lactate   Result Value Ref Range     Lactate 3.6 (H) 0.4 - 2.0 mmol/L   Type And Screen   Result Value Ref Range     ABO TYPE O       Rh TYPE POS       ANTIBODY SCREEN NEG     Troponin I, High Sensitivity   Result Value Ref Range     Troponin I, High Sensitivity 17 (H) 0 - 13 ng/L   B-Type Natriuretic Peptide   Result Value Ref Range      (H) 0 - 99 pg/mL   Blood Gas Venous   Result Value Ref Range     POCT pH, Venous 7.30 (L) 7.33 - 7.43 pH     POCT pCO2, Venous 64 (H) 41 - 51 mm Hg     POCT pO2, Venous 36 35 - 45 mm Hg     POCT SO2, Venous 61 45 - 75 %     POCT Oxy Hemoglobin, Venous 59.0 45.0 - 75.0 %     POCT Base Excess, Venous 3.2 (H) -2.0 - 3.0 mmol/L     POCT HCO3 Calculated, Venous 31.5 (H) 22.0 - 26.0 mmol/L     Patient Temperature 37.0 degrees Celsius     FiO2 36 %     Test Comment ICU-S     Urinalysis with Reflex Microscopic and Culture   Result Value Ref Range     Color, Urine Yellow Straw, Yellow     Appearance, Urine Hazy (N) Clear     Specific Gravity, Urine 1.015 1.005 - 1.035     pH, Urine 5.0 5.0, 5.5, 6.0, 6.5, 7.0, 7.5, 8.0     Protein, Urine NEGATIVE NEGATIVE mg/dL     Glucose, Urine NEGATIVE NEGATIVE mg/dL     Blood, Urine MODERATE (2+) (A) NEGATIVE     Ketones, Urine NEGATIVE NEGATIVE mg/dL     Bilirubin, Urine NEGATIVE NEGATIVE     Urobilinogen, Urine <2.0 <2.0 mg/dL     Nitrite, Urine NEGATIVE NEGATIVE     Leukocyte Esterase, Urine LARGE (3+) (A) NEGATIVE   Microscopic Only, Urine   Result Value Ref Range     WBC, Urine >50 (A) 1-5, NONE /HPF     WBC Clumps, Urine FEW Reference range not established. /HPF     RBC, Urine >20 (A) NONE, 1-2, 3-5 /HPF     Squamous Epithelial Cells, Urine 1-9 (SPARSE) Reference  range not established. /HPF     Transitional Epithelial Cells, Urine 1-2 (FEW) Reference range not established. /HPF     Bacteria, Urine 1+ (A) NONE SEEN /HPF     Budding Yeast, Urine PRESENT (A) NONE /HPF     Mucus, Urine 1+ Reference range not established. /LPF     Hyaline Casts, Urine 2+ (A) NONE /LPF            Assessment/Plan   Danielle is a 73-year-old female patient presenting to emergency department for evaluation of shortness of breath and volume overload.  Patient found to have a BNP of 116, elevated troponin, elevated lactate, elevated WBCs.  Imaging showing moderate left pleural effusion with left basilar atelectasis and mild right basilar atelectasis.  Last echocardiogram on file 2/27/2023 with an EF of 60 to 65%.  Patient having new oxygen requirements moving from 2 L/min to 4 L/min.  Patient currently receiving IV Zosyn via [facility]ICC line at [facility] for recurrent UTI.  Patient found to be UTI positive, no culture sent due to being on antibiotics chronically.  Patient given IV Lasix for volume overload in ED.  Consult placed to cardiology.  Admission for further medical management.     Acute decompensated heart failure  Admit to telemetry per Dr. Carcamo  Intake and output  See imaging results above  Daily weight  Lasix IV 40 mg in ED  Consult cardiology and appreciate input  Low-sodium diet  Fluid restriction  Chronic Cruz catheter     Diabetes/hypothyroidism/hypertension/A-fib/hyperlipidemia     Diabetic diet  ISS  Hypoglycemia protocol  Hold home oral antidiabetic medications when med rec is complete  Continue home medications  Continue home Eliquis when med rec is complete     DVT PPx  Continue home Eliquis when med rec is complete  SCDs  Activity as tolerated     I spent 30 minutes in the professional and overall care of this patient.  Patient fully evaluated on November, plan as above.                      Revision History          Patient fully evaluated on November 11.  Debrox for  bilateral ear cerumen impactions.  Continue to diurese patient and recheck labs in AM.     Patient fully evaluated on November 12.  Continue aggressive diuresis.  BUN and creatinine are stable.  Recheck labs in AM.    Patient fully evaluated on November 13.  Rise in BUN and creatinine noted and patient will transition to oral diuretics.  Recheck labs in AM.  Recheck chemistries in a.m. to check for renal function  Patient fully evaluated on November 14 and rise in BUN and creatinine noted.  Diuretics have been held as well as ARB's.  Nephrology consultation obtained.  Recheck labs in a.m.    Patient fully evaluated on November 15.  Continue to monitor BUN and creatinine diuretics been held.    Patient fully evaluated on November 16.  Continue to monitor H&H which drop was noted.  Eliquis has been held.  Continued acute kidney injury noted and appreciate nephrology consultation.  Recheck labs in AM.    Patient fully evaluated on November 17.  IV Lasix drip initiated for diuresis.  Patient has significant end-stage renal disease.    Patient fully evaluated on November 18 and consideration for dialysis.  Permacath to be placed today by interventional radiology.  Continue to monitor lab work and monitor for further fluid overload.  Patient has 2+ edema with significant anasarca.    Patient fully evaluated on November 19 and more awake and alert today.  Continue dialysis daily for now.  Pleural effusion noted which might be resolved with dialysis.  Continue to monitor for further fluid overload and recheck labs in AM.  Thien Carcamo MD

## 2023-11-19 NOTE — SIGNIFICANT EVENT
Patient had a radar alert with a score of 7. T 35.9 P 82 R 22 BP 89/57 PO 95%. Face to face triage with Claudia Cormier RN. BP recheck 114/67. No intervention.

## 2023-11-20 ENCOUNTER — APPOINTMENT (OUTPATIENT)
Dept: DIALYSIS | Facility: HOSPITAL | Age: 73
End: 2023-11-20
Payer: COMMERCIAL

## 2023-11-20 ENCOUNTER — APPOINTMENT (OUTPATIENT)
Dept: RADIOLOGY | Facility: HOSPITAL | Age: 73
DRG: 291 | End: 2023-11-20
Payer: MEDICARE

## 2023-11-20 PROBLEM — L97.509 TYPE 2 DIABETES MELLITUS WITH FOOT ULCER (MULTI): Status: ACTIVE | Noted: 2023-03-06

## 2023-11-20 PROBLEM — Z86.16 PERSONAL HISTORY OF COVID-19: Status: ACTIVE | Noted: 2021-01-05

## 2023-11-20 PROBLEM — L88 PYODERMA GANGRENOSUM (MULTI): Status: ACTIVE | Noted: 2023-03-06

## 2023-11-20 PROBLEM — J96.11 CHRONIC RESPIRATORY FAILURE WITH HYPOXIA (MULTI): Status: ACTIVE | Noted: 2023-03-06

## 2023-11-20 PROBLEM — G62.9 POLYNEUROPATHY, UNSPECIFIED: Status: ACTIVE | Noted: 2023-09-01

## 2023-11-20 PROBLEM — F32.A DEPRESSION, UNSPECIFIED: Status: RESOLVED | Noted: 2023-03-06 | Resolved: 2023-11-20

## 2023-11-20 PROBLEM — I89.0 LYMPHEDEMA OF BOTH LOWER EXTREMITIES: Status: ACTIVE | Noted: 2019-03-25

## 2023-11-20 PROBLEM — E11.621 TYPE 2 DIABETES MELLITUS WITH FOOT ULCER (MULTI): Status: ACTIVE | Noted: 2023-03-06

## 2023-11-20 PROBLEM — F33.9 MAJOR DEPRESSIVE DISORDER, RECURRENT, UNSPECIFIED (CMS-HCC): Status: ACTIVE | Noted: 2022-11-21

## 2023-11-20 PROBLEM — M54.9 DORSALGIA, UNSPECIFIED: Status: ACTIVE | Noted: 2019-04-02

## 2023-11-20 PROBLEM — F41.9 ANXIETY DISORDER, UNSPECIFIED: Status: ACTIVE | Noted: 2019-04-02

## 2023-11-20 PROBLEM — R29.6 REPEATED FALLS: Status: ACTIVE | Noted: 2019-04-02

## 2023-11-20 PROBLEM — E66.01 MORBID (SEVERE) OBESITY DUE TO EXCESS CALORIES (MULTI): Status: ACTIVE | Noted: 2019-04-02

## 2023-11-20 PROBLEM — R26.2 DIFFICULTY IN WALKING, NOT ELSEWHERE CLASSIFIED: Status: ACTIVE | Noted: 2023-03-06

## 2023-11-20 PROBLEM — M17.0 BILATERAL PRIMARY OSTEOARTHRITIS OF KNEE: Status: ACTIVE | Noted: 2019-04-02

## 2023-11-20 PROBLEM — K21.9 GASTRO-ESOPHAGEAL REFLUX DISEASE WITHOUT ESOPHAGITIS: Status: ACTIVE | Noted: 2023-09-01

## 2023-11-20 PROBLEM — N18.30 CHRONIC KIDNEY DISEASE, STAGE 3 UNSPECIFIED (MULTI): Status: ACTIVE | Noted: 2020-10-01

## 2023-11-20 PROBLEM — F32.A DEPRESSION, UNSPECIFIED: Status: ACTIVE | Noted: 2023-03-06

## 2023-11-20 PROBLEM — M62.81 MUSCLE WEAKNESS (GENERALIZED): Status: ACTIVE | Noted: 2019-04-03

## 2023-11-20 PROBLEM — E03.9 HYPOTHYROID: Status: ACTIVE | Noted: 2019-03-25

## 2023-11-20 PROBLEM — G47.33 OBSTRUCTIVE SLEEP APNEA (ADULT) (PEDIATRIC): Status: ACTIVE | Noted: 2023-03-06

## 2023-11-20 PROBLEM — Z86.718 PERSONAL HISTORY OF OTHER VENOUS THROMBOSIS AND EMBOLISM: Status: ACTIVE | Noted: 2019-04-02

## 2023-11-20 PROBLEM — Z99.81 DEPENDENCE ON SUPPLEMENTAL OXYGEN: Status: ACTIVE | Noted: 2023-03-06

## 2023-11-20 PROBLEM — N13.9 OBSTRUCTIVE AND REFLUX UROPATHY, UNSPECIFIED: Status: ACTIVE | Noted: 2023-03-06

## 2023-11-20 PROBLEM — I48.91 A-FIB (MULTI): Status: ACTIVE | Noted: 2019-03-25

## 2023-11-20 PROBLEM — R29.898 WEAKNESS OF LEFT LOWER EXTREMITY: Status: ACTIVE | Noted: 2019-03-25

## 2023-11-20 LAB
ALBUMIN SERPL BCP-MCNC: 4 G/DL (ref 3.4–5)
ANION GAP SERPL CALC-SCNC: 13 MMOL/L (ref 10–20)
BUN SERPL-MCNC: 46 MG/DL (ref 6–23)
CALCIUM SERPL-MCNC: 8 MG/DL (ref 8.6–10.3)
CHLORIDE SERPL-SCNC: 95 MMOL/L (ref 98–107)
CO2 SERPL-SCNC: 32 MMOL/L (ref 21–32)
CREAT SERPL-MCNC: 2.62 MG/DL (ref 0.5–1.05)
ERYTHROCYTE [DISTWIDTH] IN BLOOD BY AUTOMATED COUNT: 18.7 % (ref 11.5–14.5)
GFR SERPL CREATININE-BSD FRML MDRD: 19 ML/MIN/1.73M*2
GLUCOSE BLD MANUAL STRIP-MCNC: 169 MG/DL (ref 74–99)
GLUCOSE BLD MANUAL STRIP-MCNC: 171 MG/DL (ref 74–99)
GLUCOSE BLD MANUAL STRIP-MCNC: 192 MG/DL (ref 74–99)
GLUCOSE BLD MANUAL STRIP-MCNC: 247 MG/DL (ref 74–99)
GLUCOSE SERPL-MCNC: 237 MG/DL (ref 74–99)
HCT VFR BLD AUTO: 24.6 % (ref 36–46)
HGB BLD-MCNC: 7 G/DL (ref 12–16)
MCH RBC QN AUTO: 26.9 PG (ref 26–34)
MCHC RBC AUTO-ENTMCNC: 28.5 G/DL (ref 32–36)
MCV RBC AUTO: 95 FL (ref 80–100)
NRBC BLD-RTO: 2.7 /100 WBCS (ref 0–0)
PHOSPHATE SERPL-MCNC: 7.5 MG/DL (ref 2.5–4.9)
PLATELET # BLD AUTO: 212 X10*3/UL (ref 150–450)
POTASSIUM SERPL-SCNC: 4.4 MMOL/L (ref 3.5–5.3)
RBC # BLD AUTO: 2.6 X10*6/UL (ref 4–5.2)
SODIUM SERPL-SCNC: 136 MMOL/L (ref 136–145)
WBC # BLD AUTO: 16.6 X10*3/UL (ref 4.4–11.3)

## 2023-11-20 PROCEDURE — 71045 X-RAY EXAM CHEST 1 VIEW: CPT | Performed by: RADIOLOGY

## 2023-11-20 PROCEDURE — 85027 COMPLETE CBC AUTOMATED: CPT | Performed by: INTERNAL MEDICINE

## 2023-11-20 PROCEDURE — 2500000001 HC RX 250 WO HCPCS SELF ADMINISTERED DRUGS (ALT 637 FOR MEDICARE OP): Performed by: NURSE PRACTITIONER

## 2023-11-20 PROCEDURE — 2500000004 HC RX 250 GENERAL PHARMACY W/ HCPCS (ALT 636 FOR OP/ED): Performed by: INTERNAL MEDICINE

## 2023-11-20 PROCEDURE — 85007 BL SMEAR W/DIFF WBC COUNT: CPT | Performed by: INTERNAL MEDICINE

## 2023-11-20 PROCEDURE — 2060000001 HC INTERMEDIATE ICU ROOM DAILY

## 2023-11-20 PROCEDURE — 2500000004 HC RX 250 GENERAL PHARMACY W/ HCPCS (ALT 636 FOR OP/ED): Performed by: NURSE PRACTITIONER

## 2023-11-20 PROCEDURE — 71045 X-RAY EXAM CHEST 1 VIEW: CPT | Mod: FY

## 2023-11-20 PROCEDURE — 82947 ASSAY GLUCOSE BLOOD QUANT: CPT | Mod: 59

## 2023-11-20 PROCEDURE — 8010000001 HC DIALYSIS - HEMODIALYSIS PER DAY

## 2023-11-20 PROCEDURE — 2500000001 HC RX 250 WO HCPCS SELF ADMINISTERED DRUGS (ALT 637 FOR MEDICARE OP): Performed by: INTERNAL MEDICINE

## 2023-11-20 PROCEDURE — 80053 COMPREHEN METABOLIC PANEL: CPT | Performed by: INTERNAL MEDICINE

## 2023-11-20 RX ADMIN — METOPROLOL TARTRATE 25 MG: 25 TABLET, FILM COATED ORAL at 09:00

## 2023-11-20 RX ADMIN — PIPERACILLIN SODIUM AND TAZOBACTAM SODIUM 2.25 G: 2; .25 INJECTION, SOLUTION INTRAVENOUS at 01:37

## 2023-11-20 RX ADMIN — MIDODRINE HYDROCHLORIDE 10 MG: 10 TABLET ORAL at 20:05

## 2023-11-20 RX ADMIN — GABAPENTIN 100 MG: 100 CAPSULE ORAL at 12:16

## 2023-11-20 RX ADMIN — TRIAMCINOLONE ACETONIDE: 1 OINTMENT TOPICAL at 13:06

## 2023-11-20 RX ADMIN — HEPARIN SODIUM 1400 UNITS: 1000 INJECTION, SOLUTION INTRAVENOUS; SUBCUTANEOUS at 11:53

## 2023-11-20 RX ADMIN — IRON SUCROSE 50 MG: 20 INJECTION, SOLUTION INTRAVENOUS at 12:15

## 2023-11-20 RX ADMIN — TRIAMCINOLONE ACETONIDE: 1 OINTMENT TOPICAL at 20:06

## 2023-11-20 RX ADMIN — LEVOTHYROXINE SODIUM 100 MCG: 100 TABLET ORAL at 05:34

## 2023-11-20 RX ADMIN — BUSPIRONE HYDROCHLORIDE 5 MG: 5 TABLET ORAL at 12:17

## 2023-11-20 RX ADMIN — FAMOTIDINE 20 MG: 20 TABLET ORAL at 12:16

## 2023-11-20 RX ADMIN — TRAMADOL HYDROCHLORIDE 50 MG: 50 TABLET, COATED ORAL at 04:41

## 2023-11-20 RX ADMIN — BUSPIRONE HYDROCHLORIDE 5 MG: 5 TABLET ORAL at 20:05

## 2023-11-20 RX ADMIN — HEPARIN SODIUM 1400 UNITS: 1000 INJECTION, SOLUTION INTRAVENOUS; SUBCUTANEOUS at 11:50

## 2023-11-20 RX ADMIN — PREDNISONE 20 MG: 20 TABLET ORAL at 12:17

## 2023-11-20 RX ADMIN — MIDODRINE HYDROCHLORIDE 10 MG: 10 TABLET ORAL at 15:00

## 2023-11-20 RX ADMIN — PIPERACILLIN SODIUM AND TAZOBACTAM SODIUM 2.25 G: 2; .25 INJECTION, SOLUTION INTRAVENOUS at 16:00

## 2023-11-20 RX ADMIN — TRAMADOL HYDROCHLORIDE 50 MG: 50 TABLET, COATED ORAL at 16:51

## 2023-11-20 RX ADMIN — PARICALCITOL 4 MCG: 2 INJECTION INTRAVENOUS at 12:00

## 2023-11-20 RX ADMIN — HEPARIN SODIUM 2000 UNITS: 1000 INJECTION, SOLUTION INTRAVENOUS; SUBCUTANEOUS at 12:00

## 2023-11-20 RX ADMIN — Medication 10 DROP: at 12:17

## 2023-11-20 RX ADMIN — TRAMADOL HYDROCHLORIDE 50 MG: 50 TABLET, COATED ORAL at 22:38

## 2023-11-20 RX ADMIN — METOPROLOL TARTRATE 25 MG: 25 TABLET, FILM COATED ORAL at 20:05

## 2023-11-20 RX ADMIN — POLYETHYLENE GLYCOL 3350 17 G: 17 POWDER, FOR SOLUTION ORAL at 12:16

## 2023-11-20 RX ADMIN — FLUDROCORTISONE ACETATE 0.1 MG: 0.1 TABLET ORAL at 09:00

## 2023-11-20 RX ADMIN — Medication 1 TABLET: at 12:17

## 2023-11-20 RX ADMIN — PIPERACILLIN SODIUM AND TAZOBACTAM SODIUM 2.25 G: 2; .25 INJECTION, SOLUTION INTRAVENOUS at 13:05

## 2023-11-20 RX ADMIN — LORAZEPAM 1 MG: 0.5 TABLET ORAL at 12:15

## 2023-11-20 ASSESSMENT — COGNITIVE AND FUNCTIONAL STATUS - GENERAL
WALKING IN HOSPITAL ROOM: TOTAL
MOVING TO AND FROM BED TO CHAIR: TOTAL
HELP NEEDED FOR BATHING: TOTAL
TURNING FROM BACK TO SIDE WHILE IN FLAT BAD: TOTAL
PERSONAL GROOMING: TOTAL
DRESSING REGULAR UPPER BODY CLOTHING: TOTAL
STANDING UP FROM CHAIR USING ARMS: TOTAL
DAILY ACTIVITIY SCORE: 6
DRESSING REGULAR LOWER BODY CLOTHING: TOTAL
MOVING FROM LYING ON BACK TO SITTING ON SIDE OF FLAT BED WITH BEDRAILS: TOTAL
MOBILITY SCORE: 6
EATING MEALS: TOTAL
TOILETING: TOTAL
CLIMB 3 TO 5 STEPS WITH RAILING: TOTAL

## 2023-11-20 ASSESSMENT — PAIN SCALES - PAIN ASSESSMENT IN ADVANCED DEMENTIA (PAINAD)
NEGVOCALIZATION: REPEATED TROUBLED CALLING OUT, LOUD MOANING/GROANING, CRYING
BREATHING: OCCASIONAL LABORED BREATHING, SHORT PERIOD OF HYPERVENTILATION
CONSOLABILITY: DISTRACTED OR REASSURED BY VOICE/TOUCH
BREATHING: NORMAL
FACIALEXPRESSION: FACIAL GRIMACING
BODYLANGUAGE: TENSE, DISTRESSED PACING, FIDGETING
TOTALSCORE: 7
NEGVOCALIZATION: REPEATED TROUBLED CALLING OUT, LOUD MOANING/GROANING, CRYING
FACIALEXPRESSION: FACIAL GRIMACING
CONSOLABILITY: UNABLE TO CONSOLE, DISTRACT OR REASSURE
TOTALSCORE: 7
BODYLANGUAGE: TENSE, DISTRESSED PACING, FIDGETING

## 2023-11-20 ASSESSMENT — PAIN SCALES - GENERAL: PAINLEVEL_OUTOF10: 5 - MODERATE PAIN

## 2023-11-20 NOTE — PROGRESS NOTES
"  Subjective   Patient had dialysis again today 2 L removed via ultrafiltration.  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  Pupils react to light and accommodation  Neck; no hepatojugular reflux no jugular venous distention no bruit no thyromegaly no lymphadenopathy  Lungs she had crackles on inspiration left more than right with diminished air entry in the left side no wheezing  Heart; irregular rate there is no gallop no rubs  Abdomen; active peristalsis no rebound or guarding abdomen is obese no fluid wave  Extremities; severe lymphedema progressing (anasarca with ulceration heal in the right leg;  There is erythema and tenderness in the lateral aspect of the left leg suggesting cellulitis  Pulses are decreased because of the edema of the legs    Last Recorded Vitals  Blood pressure 99/53, pulse 82, temperature 34.9 °C (94.8 °F), temperature source Temporal, resp. rate 18, height 1.702 m (5' 7.01\"), weight (!) 168 kg (370 lb 6 oz), SpO2 100 %.  Intake/Output last 3 Shifts:  I/O last 3 completed shifts:  In: 2250 (15 mL/kg) [P.O.:500; I.V.:1000 (6.7 mL/kg); IV Piggyback:750]  Out: 100 (0.7 mL/kg) [Urine:100 (0 mL/kg/hr)]  Dosing Weight: 150 kg     Relevant Results  Scheduled medications  busPIRone, 5 mg, oral, BID  carbamide peroxide, 10 drop, Each Ear, BID  [Held by provider] dilTIAZem CD, 240 mg, oral, Daily  famotidine, 20 mg, oral, Daily  fludrocortisone, 0.1 mg, oral, Daily  [Held by provider] furosemide, 40 mg, intravenous, q12h  gabapentin, 100 mg, oral, Daily  heparin, 2,000 Units, intravenous, After Dialysis  heparin, 2,200 Units, intra-catheter, After Dialysis  heparin, 2,400 Units, intra-catheter, After Dialysis  insulin lispro, 0-15 Units, subcutaneous, TID with meals  iron sucrose, 50 " mg, intravenous, After Dialysis  lactobacillus acidophilus, 1 tablet, oral, Daily  levothyroxine, 100 mcg, oral, Once per day on Mon  levothyroxine, 50 mcg, oral, Once per day on Sun Tue Wed Thu Fri Sat  metoprolol tartrate, 25 mg, oral, BID  midodrine, 10 mg, oral, TID  paricalcitol, 4 mcg, intravenous, After Dialysis  piperacillin-tazobactam, 2.25 g, intravenous, q8h  polyethylene glycol, 17 g, oral, Daily  potassium chloride CR, 20 mEq, oral, Daily  predniSONE, 20 mg, oral, Daily  sodium chloride, 200 mL, intravenous, After Dialysis  sodium chloride, 250 mL, intravenous, After Dialysis  [Held by provider] torsemide, 20 mg, oral, Daily  triamcinolone, , Topical, BID      Continuous medications     PRN medications  PRN medications: acetaminophen, albumin human, bisacodyl, dextrose 10 % in water (D10W), dextrose, glucagon, LORazepam, nystatin, oxygen, sodium chloride, sodium zirconium cyclosilicate, traMADol, zinc oxide   Results for orders placed or performed during the hospital encounter of 11/09/23 (from the past 24 hour(s))   POCT GLUCOSE   Result Value Ref Range    POCT Glucose 216 (H) 74 - 99 mg/dL   Renal Function Panel   Result Value Ref Range    Glucose 235 (H) 74 - 99 mg/dL    Sodium 135 (L) 136 - 145 mmol/L    Potassium 4.3 3.5 - 5.3 mmol/L    Chloride 96 (L) 98 - 107 mmol/L    Bicarbonate 30 21 - 32 mmol/L    Anion Gap 13 10 - 20 mmol/L    Urea Nitrogen 43 (H) 6 - 23 mg/dL    Creatinine 2.48 (H) 0.50 - 1.05 mg/dL    eGFR 20 (L) >60 mL/min/1.73m*2    Calcium 7.6 (L) 8.6 - 10.3 mg/dL    Phosphorus 6.4 (H) 2.5 - 4.9 mg/dL    Albumin 3.9 3.4 - 5.0 g/dL   CBC   Result Value Ref Range    WBC 16.6 (H) 4.4 - 11.3 x10*3/uL    nRBC 2.7 (H) 0.0 - 0.0 /100 WBCs    RBC 2.60 (L) 4.00 - 5.20 x10*6/uL    Hemoglobin 7.0 (L) 12.0 - 16.0 g/dL    Hematocrit 24.6 (L) 36.0 - 46.0 %    MCV 95 80 - 100 fL    MCH 26.9 26.0 - 34.0 pg    MCHC 28.5 (L) 32.0 - 36.0 g/dL    RDW 18.7 (H) 11.5 - 14.5 %    Platelets 212 150 - 450  x10*3/uL   Renal function panel   Result Value Ref Range    Glucose 237 (H) 74 - 99 mg/dL    Sodium 136 136 - 145 mmol/L    Potassium 4.4 3.5 - 5.3 mmol/L    Chloride 95 (L) 98 - 107 mmol/L    Bicarbonate 32 21 - 32 mmol/L    Anion Gap 13 10 - 20 mmol/L    Urea Nitrogen 46 (H) 6 - 23 mg/dL    Creatinine 2.62 (H) 0.50 - 1.05 mg/dL    eGFR 19 (L) >60 mL/min/1.73m*2    Calcium 8.0 (L) 8.6 - 10.3 mg/dL    Phosphorus 7.5 (H) 2.5 - 4.9 mg/dL    Albumin 4.0 3.4 - 5.0 g/dL   POCT GLUCOSE   Result Value Ref Range    POCT Glucose 247 (H) 74 - 99 mg/dL   POCT GLUCOSE   Result Value Ref Range    POCT Glucose 171 (H) 74 - 99 mg/dL       XR chest 1 view    Result Date: 11/15/2023  Interpreted By:  Johann Wood, STUDY: XR CHEST 1 VIEW;  11/14/2023 8:44 pm   INDICATION: Signs/Symptoms:Atelectasis.   COMPARISON: 11/09/2023   ACCESSION NUMBER(S): [id number]   ORDERING CLINICIAN: LINH RONQUILLO   FINDINGS: There is near-complete opacification of the left hemithorax with several air bronchograms centrally, and only aeration remaining at the apex. This has progressed from the prior exam and most likely due to an effusion with underlying consolidation. There is volume loss with shift of the heart mediastinum to the left. There is also blunting at the right cardio hepatic angle since the prior exam indicating an additional small right effusion.   ABDOMEN AND OTHER FINDINGS: No remarkable upper abdominal findings.   BONES: No acute osseous changes.       1.  Airspace disease as described.   Signed by: Johann Wood 11/15/2023 9:33 AM Dictation workstation:   [id number]    US renal complete    Result Date: 11/15/2023  Interpreted By:  Johann Wood, STUDY: US RENAL COMPLETE;  11/14/2023 8:42 pm   INDICATION: Signs/Symptoms:Renal failure.   COMPARISON: None.   ACCESSION NUMBER(S): [id number]   ORDERING CLINICIAN: LINH RONQUILLO   TECHNIQUE: Multiple images of the kidneys were obtained  , with color Doppler for blood flow.  It is  noted the exam was limited due to patient habitus , limited mobility and continuous respiration.   FINDINGS: RIGHT KIDNEY: The right kidney measures 9.6 cm in length.  There is mild thinning of the renal cortex at the lower pole indicating mild atrophy. The remaining cortex appears maintained in homogeneous..  No hydronephrosis or evidence of nephrolithiasis. Color Doppler demonstrates renal blood flow.   LEFT KIDNEY: The left kidney could not be visualized, presumably secondary to limiting factors as above.   BLADDER: Not visualized, also probably due to limiting factors and under distension.   OTHER FINDINGS: None significant.       Mild right renal cortical atrophy at the lower pole, otherwise no hydronephrosis. The left kidney and bladder were not visualized.   Signed by: Johann Wood 11/15/2023 8:39 AM Dictation workstation:   [id number]    XR chest 1 view    Result Date: 11/10/2023  STUDY: Chest Radiograph;  11/09/2023 10:56 PM INDICATION: Shortness of breath. COMPARISON: XR chest 03/16/2023, 03/15/2023.  ACCESSION NUMBER(S): [id number] ORDERING CLINICIAN: SACIHN WELDON TECHNIQUE:  Frontal chest was obtained at 2229 hours. FINDINGS: CARDIOMEDIASTINAL SILHOUETTE: Cardiomediastinal silhouette is markedly enlarged and stable in size and configuration.  LUNGS: Left-sided PICC line is noted terminating in the region of the proximal SVC/left subclavian junction.  Mild right basilar atelectasis.  Moderate left pleural effusion is suggested with underlying left basilar atelectasis and/or consolidation.  ABDOMEN: No remarkable upper abdominal findings.  BONES: No acute osseous changes.    Moderate left pleural effusion with left basilar atelectasis and/or consolidation.  Mild right basilar atelectasis. 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

## 2023-11-20 NOTE — NURSING NOTE
Report from Sending RN:    Report From: Danielle  Recent Surgery of Procedure: Yes  Baseline Level of Consciousness (LOC): A&Ox1  Oxygen Use: Yes  Type: nasal cannula  Diabetic: Yes  Last BP Med Given Day of Dialysis: see emr  Last Pain Med Given: see emr  Lab Tests to be Obtained with Dialysis: No  Blood Transfusion to be Given During Dialysis: No  Available IV Access: Yes  Medications to be Administered During Dialysis: Yes, prn  Continuous IV Infusion Running: No  Restraints on Currently or in the Last 24 Hours: No  Hand-Off Communication: Full code/ pt stable for hd/rtdc/ 3rd tx

## 2023-11-20 NOTE — PROGRESS NOTES
Danielle Parisi is a 73 y.o. female on day 10 of admission presenting with Acute decompensated heart failure (CMS/HCC).      Subjective   Patient fully evaluated on November 11 and clinically improving.       Objective     Last Recorded Vitals  BP 99/53   Pulse 82   Temp 34.9 °C (94.8 °F) (Temporal)   Resp 18   Wt (!) 168 kg (370 lb 6 oz)   SpO2 98%   Intake/Output last 3 Shifts:    Intake/Output Summary (Last 24 hours) at 11/20/2023 1607  Last data filed at 11/20/2023 1110  Gross per 24 hour   Intake 2200 ml   Output 2000 ml   Net 200 ml         Admission Weight  Weight: 150 kg (330 lb) (11/09/23 2215)    Daily Weight  11/17/23 : (!) 168 kg (370 lb 6 oz)    Image Results  XR chest 1 view  Narrative: Interpreted By:  Radha Peres,   STUDY:  XR CHEST 1 VIEW;  11/18/2023 1:35 pm      INDICATION:  Signs/Symptoms:post dialysis catheter.      COMPARISON:  11/14/2023      ACCESSION NUMBER(S):  [id number]      ORDERING CLINICIAN:  MERI RANKIN      FINDINGS:  CARDIOMEDIASTINAL SILHOUETTE:  Cardiomediastinal silhouette is normal in size and configuration.  There is a right subclavian large bore catheter with the tip in the  superior vena cava. There is a left PICC line with the tip in the  left brachiocephalic vein.      LUNGS:  Again there is complete opacification of the left hemithorax which  could represent collapse of the left lung or large effusion. There is  mediastinal shift to the left suggesting volume loss at the left  lung. There is progressive consolidation at the right lung base.      ABDOMEN:  No remarkable upper abdominal findings.          BONES:  No acute osseous changes.      Impression: 1. Complete opacification left hemithorax, unchanged compared to  11/14/2023, likely left lung collapse.  2. Progressive pneumonia right lung base.      MACRO:  None      Signed by: Radha Peres 11/18/2023 1:46 PM  Dictation workstation:   [id number]      Physical Exam    Relevant Results                Assessment/Plan   This patient currently has cardiac telemetry ordered; if you would like to modify or discontinue the telemetry order, click here to go to the orders activity to modify/discontinue the order.              Principal Problem:    Acute decompensated heart failure (CMS/[facility])          Thien Carcamo MD  Physician  Internal Medicine     H&P      Addendum     Date of Service: 11/10/2023  5:38 AM     Addendum       Expand All Collapse All    History Of Present Illness  Danielle Parisi is a 73 y.o. female presenting to emergency department from skilled nursing facility for evaluation of increasing shortness of breath and volume overload.  Patient has a PICC line and is on IV Zosyn for recurrent UTI.  Over the past few days she has had a worsening shortness of breath.  She does admit to increasing lower extremity edema.  Patient is basically bedbound, denies fever but does admit to a cough.  Patient denies recent illness, nausea, vomiting, diarrhea.     In ED, , glucose 183, BUN 59, creatinine 1.46, GFR 38, AST 8, lactate 3.6 with a repeat pending.  WBCs 14.6, hemoglobin 8.3, hematocrit 29.7, troponin 17 with a repeat pending.  Chest x-ray completed showing moderate left pleural effusion with left basilar atelectasis, mild right basilar atelectasis per radiology review.  Last echocardiogram on file 2/27/2023 showing an ejection fraction of 60 to 65% with a dilated right atrium and severe tricuspid regurgitation.  EKG showing A-fib at a rate of 90 without ST elevation or depression per ER physician review.  Patient given Lasix IV in ED.  Consult placed to cardiology.  Patient admitted to telemetry under the care of Dr. Carcamo who will continue to follow.  I was asked to do H&P and place initial admission orders.     Past Medical History  Hypertension, A-fib on Eliquis, CONOR, hypothyroidism, lymphedema, diabetes, hyperlipidemia, history of DVT, obesity     Surgical History  Patient denies     Social  "History  She has no history on file for tobacco use, alcohol use, and drug use.     Family History  Reviewed and noncontributory     Allergies  Penicillins and Sulfa (sulfonamide antibiotics)     Review of Systems  A 10 point review of systems was completed and negative except what is listed in HPI  Physical Exam  Constitutional:       Appearance: She is obese.   HENT:      Mouth/Throat:      Mouth: Mucous membranes are dry.      Pharynx: Oropharynx is clear.   Eyes:      Pupils: Pupils are equal, round, and reactive to light.   Cardiovascular:      Rate and Rhythm: Rhythm irregularly irregular.   Pulmonary:      Breath sounds: Rales present.   Abdominal:      General: Bowel sounds are normal.   Musculoskeletal:         General: Normal range of motion.      Cervical back: Normal range of motion.   Skin:     General: Skin is warm and dry.      Capillary Refill: Capillary refill takes less than 2 seconds.   Neurological:      General: No focal deficit present.      Mental Status: She is alert. Mental status is at baseline.   Psychiatric:         Mood and Affect: Mood normal.         Behavior: Behavior normal.            Last Recorded Vitals  Blood pressure 102/56, pulse 104, temperature 35.8 °C (96.4 °F), resp. rate 18, height 1.702 m (5' 7\"), weight (!) 168 kg (370 lb 6 oz), SpO2 97 %.     Relevant Results  XR chest 1 view     Result Date: 11/10/2023  STUDY: Chest Radiograph;  11/09/2023 10:56 PM INDICATION: Shortness of breath. COMPARISON: XR chest 03/16/2023, 03/15/2023.  ACCESSION NUMBER(S): [id number] ORDERING CLINICIAN: SACHIN WELDON TECHNIQUE:  Frontal chest was obtained at 2229 hours. FINDINGS: CARDIOMEDIASTINAL SILHOUETTE: Cardiomediastinal silhouette is markedly enlarged and stable in size and configuration.  LUNGS: Left-sided PICC line is noted terminating in the region of the proximal SVC/left subclavian junction.  Mild right basilar atelectasis.  Moderate left pleural effusion is suggested with " underlying left basilar atelectasis and/or consolidation.  ABDOMEN: No remarkable upper abdominal findings.  BONES: No acute osseous changes.     Moderate left pleural effusion with left basilar atelectasis and/or consolidation.  Mild right basilar atelectasis. Signed by Nakul Rucker MD                  Results for orders placed or performed during the hospital encounter of 11/09/23 (from the past 24 hour(s))   CBC and Auto Differential   Result Value Ref Range     WBC 14.6 (H) 4.4 - 11.3 x10*3/uL     nRBC 0.3 (H) 0.0 - 0.0 /100 WBCs     RBC 3.15 (L) 4.00 - 5.20 x10*6/uL     Hemoglobin 8.3 (L) 12.0 - 16.0 g/dL     Hematocrit 29.7 (L) 36.0 - 46.0 %     MCV 94 80 - 100 fL     MCH 26.3 26.0 - 34.0 pg     MCHC 27.9 (L) 32.0 - 36.0 g/dL     RDW 17.8 (H) 11.5 - 14.5 %     Platelets 306 150 - 450 x10*3/uL     Neutrophils % 92.8 40.0 - 80.0 %     Immature Granulocytes %, Automated 1.0 (H) 0.0 - 0.9 %     Lymphocytes % 2.6 13.0 - 44.0 %     Monocytes % 3.4 2.0 - 10.0 %     Eosinophils % 0.1 0.0 - 6.0 %     Basophils % 0.1 0.0 - 2.0 %     Neutrophils Absolute 13.55 (H) 1.60 - 5.50 x10*3/uL     Immature Granulocytes Absolute, Automated 0.15 0.00 - 0.50 x10*3/uL     Lymphocytes Absolute 0.38 (L) 0.80 - 3.00 x10*3/uL     Monocytes Absolute 0.50 0.05 - 0.80 x10*3/uL     Eosinophils Absolute 0.01 0.00 - 0.40 x10*3/uL     Basophils Absolute 0.02 0.00 - 0.10 x10*3/uL   Magnesium   Result Value Ref Range     Magnesium 1.67 1.60 - 2.40 mg/dL   Comprehensive metabolic panel   Result Value Ref Range     Glucose 183 (H) 74 - 99 mg/dL     Sodium 142 136 - 145 mmol/L     Potassium 4.0 3.5 - 5.3 mmol/L     Chloride 102 98 - 107 mmol/L     Bicarbonate 32 21 - 32 mmol/L     Anion Gap 12 10 - 20 mmol/L     Urea Nitrogen 59 (H) 6 - 23 mg/dL     Creatinine 1.46 (H) 0.50 - 1.05 mg/dL     eGFR 38 (L) >60 mL/min/1.73m*2     Calcium 8.5 (L) 8.6 - 10.3 mg/dL     Albumin 3.0 (L) 3.4 - 5.0 g/dL     Alkaline Phosphatase 89 33 - 136 U/L     Total  Protein 5.6 (L) 6.4 - 8.2 g/dL     AST 8 (L) 9 - 39 U/L     Bilirubin, Total 0.3 0.0 - 1.2 mg/dL     ALT 25 7 - 45 U/L   Protime-INR   Result Value Ref Range     Protime 11.6 9.8 - 12.8 seconds     INR 1.0 0.9 - 1.1   aPTT   Result Value Ref Range     aPTT 30 27 - 38 seconds   Lactate   Result Value Ref Range     Lactate 3.6 (H) 0.4 - 2.0 mmol/L   Type And Screen   Result Value Ref Range     ABO TYPE O       Rh TYPE POS       ANTIBODY SCREEN NEG     Troponin I, High Sensitivity   Result Value Ref Range     Troponin I, High Sensitivity 17 (H) 0 - 13 ng/L   B-Type Natriuretic Peptide   Result Value Ref Range      (H) 0 - 99 pg/mL   Blood Gas Venous   Result Value Ref Range     POCT pH, Venous 7.30 (L) 7.33 - 7.43 pH     POCT pCO2, Venous 64 (H) 41 - 51 mm Hg     POCT pO2, Venous 36 35 - 45 mm Hg     POCT SO2, Venous 61 45 - 75 %     POCT Oxy Hemoglobin, Venous 59.0 45.0 - 75.0 %     POCT Base Excess, Venous 3.2 (H) -2.0 - 3.0 mmol/L     POCT HCO3 Calculated, Venous 31.5 (H) 22.0 - 26.0 mmol/L     Patient Temperature 37.0 degrees Celsius     FiO2 36 %     Test Comment ICU-S     Urinalysis with Reflex Microscopic and Culture   Result Value Ref Range     Color, Urine Yellow Straw, Yellow     Appearance, Urine Hazy (N) Clear     Specific Gravity, Urine 1.015 1.005 - 1.035     pH, Urine 5.0 5.0, 5.5, 6.0, 6.5, 7.0, 7.5, 8.0     Protein, Urine NEGATIVE NEGATIVE mg/dL     Glucose, Urine NEGATIVE NEGATIVE mg/dL     Blood, Urine MODERATE (2+) (A) NEGATIVE     Ketones, Urine NEGATIVE NEGATIVE mg/dL     Bilirubin, Urine NEGATIVE NEGATIVE     Urobilinogen, Urine <2.0 <2.0 mg/dL     Nitrite, Urine NEGATIVE NEGATIVE     Leukocyte Esterase, Urine LARGE (3+) (A) NEGATIVE   Microscopic Only, Urine   Result Value Ref Range     WBC, Urine >50 (A) 1-5, NONE /HPF     WBC Clumps, Urine FEW Reference range not established. /HPF     RBC, Urine >20 (A) NONE, 1-2, 3-5 /HPF     Squamous Epithelial Cells, Urine 1-9 (SPARSE) Reference  range not established. /HPF     Transitional Epithelial Cells, Urine 1-2 (FEW) Reference range not established. /HPF     Bacteria, Urine 1+ (A) NONE SEEN /HPF     Budding Yeast, Urine PRESENT (A) NONE /HPF     Mucus, Urine 1+ Reference range not established. /LPF     Hyaline Casts, Urine 2+ (A) NONE /LPF            Assessment/Plan   Danielle is a 73-year-old female patient presenting to emergency department for evaluation of shortness of breath and volume overload.  Patient found to have a BNP of 116, elevated troponin, elevated lactate, elevated WBCs.  Imaging showing moderate left pleural effusion with left basilar atelectasis and mild right basilar atelectasis.  Last echocardiogram on file 2/27/2023 with an EF of 60 to 65%.  Patient having new oxygen requirements moving from 2 L/min to 4 L/min.  Patient currently receiving IV Zosyn via PICC line at [facility] for recurrent UTI.  Patient found to be UTI positive, no culture sent due to being on antibiotics chronically.  Patient given IV Lasix for volume overload in ED.  Consult placed to cardiology.  Admission for further medical management.     Acute decompensated heart failure  Admit to telemetry per Dr. Carcamo  Intake and output  See imaging results above  Daily weight  Lasix IV 40 mg in ED  Consult cardiology and appreciate input  Low-sodium diet  Fluid restriction  Chronic Cruz catheter     Diabetes/hypothyroidism/hypertension/A-fib/hyperlipidemia     Diabetic diet  ISS  Hypoglycemia protocol  Hold home oral antidiabetic medications when med rec is complete  Continue home medications  Continue home Eliquis when med rec is complete     DVT PPx  Continue home Eliquis when med rec is complete  SCDs  Activity as tolerated     I spent 30 minutes in the professional and overall care of this patient.  Patient fully evaluated on November, plan as above.                      Revision History          Patient fully evaluated on November 11.  Debrox for  bilateral ear cerumen impactions.  Continue to diurese patient and recheck labs in AM.     Patient fully evaluated on November 12.  Continue aggressive diuresis.  BUN and creatinine are stable.  Recheck labs in AM.    Patient fully evaluated on November 13.  Rise in BUN and creatinine noted and patient will transition to oral diuretics.  Recheck labs in AM.  Recheck chemistries in a.m. to check for renal function  Patient fully evaluated on November 14 and rise in BUN and creatinine noted.  Diuretics have been held as well as ARB's.  Nephrology consultation obtained.  Recheck labs in a.m.    Patient fully evaluated on November 15.  Continue to monitor BUN and creatinine diuretics been held.    Patient fully evaluated on November 16.  Continue to monitor H&H which drop was noted.  Eliquis has been held.  Continued acute kidney injury noted and appreciate nephrology consultation.  Recheck labs in AM.    Patient fully evaluated on November 17.  IV Lasix drip initiated for diuresis.  Patient has significant end-stage renal disease.    Patient fully evaluated on November 18 and consideration for dialysis.  Permacath to be placed today by interventional radiology.  Continue to monitor lab work and monitor for further fluid overload.  Patient has 2+ edema with significant anasarca.    Patient fully evaluated on November 19 and more awake and alert today.  Continue dialysis daily for now.  Pleural effusion noted which might be resolved with dialysis.  Continue to monitor for further fluid overload and recheck labs in AM.    Patient fully evaluated on November 20.  Still very lethargic.  Continue to monitor BUN and creatinine.  Repeat chest x-ray ordered.  Thien Carcamo MD

## 2023-11-20 NOTE — NURSING NOTE
Report to Receiving RN:    Report From: Danielle Galloway Report Called: 11:27  Hand-Off Communication: 3hrs IHD completed, tx tolerated. 2 liters removed. Pt stable post tx  Complications During Treatment: No  Ultrafiltration Treatment: No  Medications Administered During Dialysis: Yes, heparin  Blood Products Administered During Dialysis: No  Labs Sent During Dialysis: No  Heparin Drip Rate Changes: N/A    Electronic Signatures:  Signed Edith Greco RN)   Authored:    (Signed    Authored:     Last Updated: 11:26 AM by EDITH GRECO

## 2023-11-20 NOTE — PROGRESS NOTES
Patient is a planned return to [facility]. Awaiting medical clearance for dsicharge. Care Transitions will continue to follow during course of hospital stay for any changes to discharge needs.  Lani Garcia RN

## 2023-11-20 NOTE — CARE PLAN
The patient's goals for the shift include rest and comfort    The clinical goals for the shift include patient pain will remain in control and she will refrain from complications

## 2023-11-21 ENCOUNTER — APPOINTMENT (OUTPATIENT)
Dept: RADIOLOGY | Facility: HOSPITAL | Age: 73
DRG: 291 | End: 2023-11-21
Payer: MEDICARE

## 2023-11-21 ENCOUNTER — APPOINTMENT (OUTPATIENT)
Dept: DIALYSIS | Facility: HOSPITAL | Age: 73
End: 2023-11-21
Payer: MEDICARE

## 2023-11-21 ENCOUNTER — APPOINTMENT (OUTPATIENT)
Dept: CARDIOLOGY | Facility: CLINIC | Age: 73
End: 2023-11-21
Payer: MEDICARE

## 2023-11-21 LAB
ALBUMIN SERPL BCP-MCNC: 3.8 G/DL (ref 3.4–5)
ALBUMIN SERPL BCP-MCNC: 3.9 G/DL (ref 3.4–5)
ALP SERPL-CCNC: 116 U/L (ref 33–136)
ALP SERPL-CCNC: 170 U/L (ref 33–136)
ALT SERPL W P-5'-P-CCNC: 212 U/L (ref 7–45)
ALT SERPL W P-5'-P-CCNC: 61 U/L (ref 7–45)
ANION GAP BLDA CALCULATED.4IONS-SCNC: 10 MMO/L (ref 10–25)
ANION GAP SERPL CALC-SCNC: 17 MMOL/L (ref 10–20)
ANION GAP SERPL CALC-SCNC: 18 MMOL/L (ref 10–20)
AST SERPL W P-5'-P-CCNC: 161 U/L (ref 9–39)
AST SERPL W P-5'-P-CCNC: 39 U/L (ref 9–39)
BASE EXCESS BLDA CALC-SCNC: 1.6 MMOL/L (ref -2–3)
BASO STIPL BLD QL SMEAR: PRESENT
BASOPHILS # BLD MANUAL: 0 X10*3/UL (ref 0–0.1)
BASOPHILS NFR BLD MANUAL: 0 %
BILIRUB SERPL-MCNC: 0.9 MG/DL (ref 0–1.2)
BILIRUB SERPL-MCNC: 1.3 MG/DL (ref 0–1.2)
BODY TEMPERATURE: 37 DEGREES CELSIUS
BUN SERPL-MCNC: 31 MG/DL (ref 6–23)
BUN SERPL-MCNC: 37 MG/DL (ref 6–23)
BURR CELLS BLD QL SMEAR: ABNORMAL
CA-I BLDA-SCNC: 1.07 MMOL/L (ref 1.1–1.33)
CALCIUM SERPL-MCNC: 8.1 MG/DL (ref 8.6–10.3)
CALCIUM SERPL-MCNC: 8.3 MG/DL (ref 8.6–10.3)
CHLORIDE BLDA-SCNC: 100 MMOL/L (ref 98–107)
CHLORIDE SERPL-SCNC: 95 MMOL/L (ref 98–107)
CHLORIDE SERPL-SCNC: 96 MMOL/L (ref 98–107)
CO2 SERPL-SCNC: 27 MMOL/L (ref 21–32)
CO2 SERPL-SCNC: 27 MMOL/L (ref 21–32)
CREAT SERPL-MCNC: 1.99 MG/DL (ref 0.5–1.05)
CREAT SERPL-MCNC: 2.21 MG/DL (ref 0.5–1.05)
EOSINOPHIL # BLD MANUAL: 0 X10*3/UL (ref 0–0.4)
EOSINOPHIL NFR BLD MANUAL: 0 %
ERYTHROCYTE [DISTWIDTH] IN BLOOD BY AUTOMATED COUNT: 18.9 % (ref 11.5–14.5)
ERYTHROCYTE [DISTWIDTH] IN BLOOD BY AUTOMATED COUNT: 19.5 % (ref 11.5–14.5)
GFR SERPL CREATININE-BSD FRML MDRD: 23 ML/MIN/1.73M*2
GFR SERPL CREATININE-BSD FRML MDRD: 26 ML/MIN/1.73M*2
GLUCOSE BLD MANUAL STRIP-MCNC: 128 MG/DL (ref 74–99)
GLUCOSE BLD MANUAL STRIP-MCNC: 171 MG/DL (ref 74–99)
GLUCOSE BLD MANUAL STRIP-MCNC: 179 MG/DL (ref 74–99)
GLUCOSE BLDA-MCNC: 132 MG/DL (ref 74–99)
GLUCOSE SERPL-MCNC: 189 MG/DL (ref 74–99)
GLUCOSE SERPL-MCNC: 193 MG/DL (ref 74–99)
HCO3 BLDA-SCNC: 27.6 MMOL/L (ref 22–26)
HCT VFR BLD AUTO: 25.9 % (ref 36–46)
HCT VFR BLD AUTO: 26.3 % (ref 36–46)
HCT VFR BLD EST: 24 % (ref 36–46)
HGB BLD-MCNC: 7.4 G/DL (ref 12–16)
HGB BLD-MCNC: 7.5 G/DL (ref 12–16)
HGB BLDA-MCNC: 8 G/DL (ref 12–16)
HYPOCHROMIA BLD QL SMEAR: ABNORMAL
IMM GRANULOCYTES # BLD AUTO: 1.15 X10*3/UL (ref 0–0.5)
IMM GRANULOCYTES NFR BLD AUTO: 5.4 % (ref 0–0.9)
INHALED O2 CONCENTRATION: 40 %
LACTATE BLDA-SCNC: 2.2 MMOL/L (ref 0.4–2)
LACTATE SERPL-SCNC: 1.4 MMOL/L (ref 0.4–2)
LYMPHOCYTES # BLD MANUAL: 0.42 X10*3/UL (ref 0.8–3)
LYMPHOCYTES NFR BLD MANUAL: 2 %
MCH RBC QN AUTO: 26.6 PG (ref 26–34)
MCH RBC QN AUTO: 26.6 PG (ref 26–34)
MCHC RBC AUTO-ENTMCNC: 28.1 G/DL (ref 32–36)
MCHC RBC AUTO-ENTMCNC: 29 G/DL (ref 32–36)
MCV RBC AUTO: 92 FL (ref 80–100)
MCV RBC AUTO: 95 FL (ref 80–100)
METAMYELOCYTES # BLD MANUAL: 0.21 X10*3/UL
METAMYELOCYTES NFR BLD MANUAL: 1 %
MONOCYTES # BLD MANUAL: 0.64 X10*3/UL (ref 0.05–0.8)
MONOCYTES NFR BLD MANUAL: 3 %
MRSA DNA SPEC QL NAA+PROBE: DETECTED
MYELOCYTES # BLD MANUAL: 0.42 X10*3/UL
MYELOCYTES NFR BLD MANUAL: 2 %
NEUTROPHILS # BLD MANUAL: 19.5 X10*3/UL (ref 1.6–5.5)
NEUTS BAND # BLD MANUAL: 1.27 X10*3/UL (ref 0–0.5)
NEUTS BAND NFR BLD MANUAL: 6 %
NEUTS SEG # BLD MANUAL: 18.23 X10*3/UL (ref 1.6–5)
NEUTS SEG NFR BLD MANUAL: 86 %
NRBC BLD-RTO: 4.5 /100 WBCS (ref 0–0)
NRBC BLD-RTO: 4.7 /100 WBCS (ref 0–0)
OVALOCYTES BLD QL SMEAR: ABNORMAL
OXYHGB MFR BLDA: 92.3 % (ref 94–98)
PCO2 BLDA: 50 MM HG (ref 38–42)
PH BLDA: 7.35 PH (ref 7.38–7.42)
PLATELET # BLD AUTO: 160 X10*3/UL (ref 150–450)
PLATELET # BLD AUTO: 255 X10*3/UL (ref 150–450)
PLATELET CLUMP BLD QL SMEAR: PRESENT
PO2 BLDA: 70 MM HG (ref 85–95)
POLYCHROMASIA BLD QL SMEAR: ABNORMAL
POTASSIUM BLDA-SCNC: 3.9 MMOL/L (ref 3.5–5.3)
POTASSIUM SERPL-SCNC: 3.9 MMOL/L (ref 3.5–5.3)
POTASSIUM SERPL-SCNC: 4.4 MMOL/L (ref 3.5–5.3)
PROT SERPL-MCNC: 5.6 G/DL (ref 6.4–8.2)
PROT SERPL-MCNC: 5.6 G/DL (ref 6.4–8.2)
RBC # BLD AUTO: 2.78 X10*6/UL (ref 4–5.2)
RBC # BLD AUTO: 2.82 X10*6/UL (ref 4–5.2)
RBC MORPH BLD: ABNORMAL
SAO2 % BLDA: 96 % (ref 94–100)
SODIUM BLDA-SCNC: 134 MMOL/L (ref 136–145)
SODIUM SERPL-SCNC: 136 MMOL/L (ref 136–145)
SODIUM SERPL-SCNC: 136 MMOL/L (ref 136–145)
TOTAL CELLS COUNTED BLD: 100
WBC # BLD AUTO: 21.2 X10*3/UL (ref 4.4–11.3)
WBC # BLD AUTO: 24.8 X10*3/UL (ref 4.4–11.3)

## 2023-11-21 PROCEDURE — 5A1D70Z PERFORMANCE OF URINARY FILTRATION, INTERMITTENT, LESS THAN 6 HOURS PER DAY: ICD-10-PCS | Performed by: INTERNAL MEDICINE

## 2023-11-21 PROCEDURE — 2500000004 HC RX 250 GENERAL PHARMACY W/ HCPCS (ALT 636 FOR OP/ED): Performed by: INTERNAL MEDICINE

## 2023-11-21 PROCEDURE — 36600 WITHDRAWAL OF ARTERIAL BLOOD: CPT

## 2023-11-21 PROCEDURE — 2500000001 HC RX 250 WO HCPCS SELF ADMINISTERED DRUGS (ALT 637 FOR MEDICARE OP): Performed by: NURSE PRACTITIONER

## 2023-11-21 PROCEDURE — 85027 COMPLETE CBC AUTOMATED: CPT | Performed by: INTERNAL MEDICINE

## 2023-11-21 PROCEDURE — 71045 X-RAY EXAM CHEST 1 VIEW: CPT

## 2023-11-21 PROCEDURE — 84132 ASSAY OF SERUM POTASSIUM: CPT | Performed by: INTERNAL MEDICINE

## 2023-11-21 PROCEDURE — 87641 MR-STAPH DNA AMP PROBE: CPT | Performed by: INTERNAL MEDICINE

## 2023-11-21 PROCEDURE — 71250 CT THORAX DX C-: CPT | Performed by: RADIOLOGY

## 2023-11-21 PROCEDURE — 2500000001 HC RX 250 WO HCPCS SELF ADMINISTERED DRUGS (ALT 637 FOR MEDICARE OP): Performed by: INTERNAL MEDICINE

## 2023-11-21 PROCEDURE — P9047 ALBUMIN (HUMAN), 25%, 50ML: HCPCS | Mod: JZ | Performed by: INTERNAL MEDICINE

## 2023-11-21 PROCEDURE — 83605 ASSAY OF LACTIC ACID: CPT | Performed by: INTERNAL MEDICINE

## 2023-11-21 PROCEDURE — 2500000004 HC RX 250 GENERAL PHARMACY W/ HCPCS (ALT 636 FOR OP/ED): Performed by: NURSE PRACTITIONER

## 2023-11-21 PROCEDURE — 37799 UNLISTED PX VASCULAR SURGERY: CPT | Performed by: INTERNAL MEDICINE

## 2023-11-21 PROCEDURE — 82947 ASSAY GLUCOSE BLOOD QUANT: CPT | Mod: 59

## 2023-11-21 PROCEDURE — 71045 X-RAY EXAM CHEST 1 VIEW: CPT | Performed by: RADIOLOGY

## 2023-11-21 PROCEDURE — 8010000001 HC DIALYSIS - HEMODIALYSIS PER DAY

## 2023-11-21 PROCEDURE — 71250 CT THORAX DX C-: CPT

## 2023-11-21 PROCEDURE — 2020000001 HC ICU ROOM DAILY

## 2023-11-21 RX ORDER — ALBUMIN HUMAN 250 G/1000ML
12.5 SOLUTION INTRAVENOUS
Status: DISCONTINUED | OUTPATIENT
Start: 2023-11-21 | End: 2023-11-22 | Stop reason: HOSPADM

## 2023-11-21 RX ORDER — HEPARIN SODIUM 1000 [USP'U]/ML
2000 INJECTION, SOLUTION INTRAVENOUS; SUBCUTANEOUS
Status: DISCONTINUED | OUTPATIENT
Start: 2023-11-21 | End: 2023-11-22 | Stop reason: HOSPADM

## 2023-11-21 RX ORDER — HEPARIN SODIUM 1000 [USP'U]/ML
2000 INJECTION, SOLUTION INTRAVENOUS; SUBCUTANEOUS
Status: DISCONTINUED | OUTPATIENT
Start: 2023-11-22 | End: 2023-11-22 | Stop reason: HOSPADM

## 2023-11-21 RX ORDER — MEROPENEM 500 MG/1
500 INJECTION, POWDER, FOR SOLUTION INTRAVENOUS EVERY 24 HOURS
Status: DISCONTINUED | OUTPATIENT
Start: 2023-11-21 | End: 2023-11-22 | Stop reason: HOSPADM

## 2023-11-21 RX ADMIN — POLYETHYLENE GLYCOL 3350 17 G: 17 POWDER, FOR SOLUTION ORAL at 09:02

## 2023-11-21 RX ADMIN — VANCOMYCIN HYDROCHLORIDE 1.5 G: 1.5 INJECTION, POWDER, LYOPHILIZED, FOR SOLUTION INTRAVENOUS at 13:00

## 2023-11-21 RX ADMIN — MEROPENEM 500 MG: 500 INJECTION, POWDER, FOR SOLUTION INTRAVENOUS at 16:44

## 2023-11-21 RX ADMIN — LORAZEPAM 1 MG: 0.5 TABLET ORAL at 09:02

## 2023-11-21 RX ADMIN — PIPERACILLIN SODIUM AND TAZOBACTAM SODIUM 2.25 G: 2; .25 INJECTION, SOLUTION INTRAVENOUS at 09:05

## 2023-11-21 RX ADMIN — LEVOTHYROXINE SODIUM 50 MCG: 100 TABLET ORAL at 05:55

## 2023-11-21 RX ADMIN — VANCOMYCIN HYDROCHLORIDE 2 G: 10 INJECTION, POWDER, LYOPHILIZED, FOR SOLUTION INTRAVENOUS at 16:44

## 2023-11-21 RX ADMIN — TRIAMCINOLONE ACETONIDE: 1 OINTMENT TOPICAL at 11:17

## 2023-11-21 RX ADMIN — TRAMADOL HYDROCHLORIDE 50 MG: 50 TABLET, COATED ORAL at 16:44

## 2023-11-21 RX ADMIN — SODIUM CHLORIDE 200 ML: 9 INJECTION, SOLUTION INTRAVENOUS at 12:30

## 2023-11-21 RX ADMIN — TRAMADOL HYDROCHLORIDE 50 MG: 50 TABLET, COATED ORAL at 05:55

## 2023-11-21 RX ADMIN — PIPERACILLIN SODIUM AND TAZOBACTAM SODIUM 2.25 G: 2; .25 INJECTION, SOLUTION INTRAVENOUS at 00:39

## 2023-11-21 RX ADMIN — PREDNISONE 20 MG: 20 TABLET ORAL at 09:03

## 2023-11-21 RX ADMIN — HEPARIN SODIUM 2400 UNITS: 1000 INJECTION, SOLUTION INTRAVENOUS; SUBCUTANEOUS at 14:15

## 2023-11-21 RX ADMIN — ALBUMIN HUMAN 12.5 G: 0.25 SOLUTION INTRAVENOUS at 14:50

## 2023-11-21 RX ADMIN — Medication 10 DROP: at 11:17

## 2023-11-21 RX ADMIN — FLUDROCORTISONE ACETATE 0.1 MG: 0.1 TABLET ORAL at 11:20

## 2023-11-21 RX ADMIN — GABAPENTIN 100 MG: 100 CAPSULE ORAL at 09:03

## 2023-11-21 RX ADMIN — Medication 1 TABLET: at 09:03

## 2023-11-21 RX ADMIN — BUSPIRONE HYDROCHLORIDE 5 MG: 5 TABLET ORAL at 09:03

## 2023-11-21 RX ADMIN — FAMOTIDINE 20 MG: 20 TABLET ORAL at 09:03

## 2023-11-21 RX ADMIN — HEPARIN SODIUM 2200 UNITS: 1000 INJECTION, SOLUTION INTRAVENOUS; SUBCUTANEOUS at 14:15

## 2023-11-21 RX ADMIN — MIDODRINE HYDROCHLORIDE 10 MG: 10 TABLET ORAL at 09:03

## 2023-11-21 ASSESSMENT — PAIN SCALES - GENERAL: PAINLEVEL_OUTOF10: 5 - MODERATE PAIN

## 2023-11-21 ASSESSMENT — COGNITIVE AND FUNCTIONAL STATUS - GENERAL
WALKING IN HOSPITAL ROOM: TOTAL
MOVING TO AND FROM BED TO CHAIR: TOTAL
MOVING FROM LYING ON BACK TO SITTING ON SIDE OF FLAT BED WITH BEDRAILS: TOTAL
DRESSING REGULAR LOWER BODY CLOTHING: TOTAL
STANDING UP FROM CHAIR USING ARMS: TOTAL
DAILY ACTIVITIY SCORE: 6
HELP NEEDED FOR BATHING: TOTAL
DRESSING REGULAR UPPER BODY CLOTHING: TOTAL
TURNING FROM BACK TO SIDE WHILE IN FLAT BAD: TOTAL
EATING MEALS: TOTAL
MOBILITY SCORE: 6
CLIMB 3 TO 5 STEPS WITH RAILING: TOTAL
TOILETING: TOTAL
PERSONAL GROOMING: TOTAL

## 2023-11-21 ASSESSMENT — PAIN - FUNCTIONAL ASSESSMENT
PAIN_FUNCTIONAL_ASSESSMENT: CPOT (CRITICAL CARE PAIN OBSERVATION TOOL)
PAIN_FUNCTIONAL_ASSESSMENT: 0-10

## 2023-11-21 NOTE — PROGRESS NOTES
Attempted to meet with patient at bedside to follow up on discharge plan, patient off floor in dialysis.  Patient not medically ready for discharge. Nephrology following, patient has been started on dialysis. Pulmonology consulted. Patient receiving IV antibiotics. Care transitions team will continue to follow.

## 2023-11-21 NOTE — PROGRESS NOTES
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  Pupils react to light and accommodation  Neck; no hepatojugular reflux no jugular venous distention no bruit no thyromegaly no lymphadenopathy  Lungs she had crackles on inspiration left more than right with diminished air entry in the left side no wheezing  Heart; irregular rate there is no gallop no rubs  Abdomen; active peristalsis no rebound or guarding abdomen is obese no fluid wave  Extremities; severe lymphedema progressing (anasarca with ulceration heal in the right leg;  There is erythema and tenderness in the lateral aspect of the left leg suggesting cellulitis  Pulses are decreased because of the edema of the legs    Last Recorded Vitals  Blood pressure 127/58, pulse 94, temperature 36.3 °C (97.3 °F), resp. rate 18, height 1.702 m (5' 7.01\"), weight (!) 168 kg (370 lb 6 oz), SpO2 96 %.  Intake/Output last 3 Shifts:  I/O last 3 completed shifts:  In: 1900 (12.7 mL/kg) [I.V.:1200 (8 mL/kg); Other:400; IV Piggyback:300]  Out: 2000 (13.3 mL/kg) [Other:2000]  Dosing Weight: 150 kg     Relevant Results  Scheduled medications  busPIRone, 5 mg, oral, BID  carbamide peroxide, 10 drop, Each Ear, BID  [Held by provider] dilTIAZem CD, 240 mg, oral, Daily  [START ON 11/22/2023] epoetin frankie or biosimilar, 20,000 Units, subcutaneous, Once per day on Mon Wed Fri  famotidine, 20 mg, oral, Daily  fludrocortisone, 0.1 mg, oral, Daily  [Held by provider] furosemide, 40 mg, intravenous, q12h  gabapentin, 100 mg, oral, Daily  heparin, 2,000 Units, intravenous, After Dialysis  [START ON 11/22/2023] heparin, 2,000 Units, intra-catheter, After Dialysis  heparin, 2,000 Units, intra-catheter, After Dialysis  heparin, 2,200 Units, " intra-catheter, After Dialysis  heparin, 2,400 Units, intra-catheter, After Dialysis  insulin lispro, 0-15 Units, subcutaneous, TID with meals  iron sucrose, 50 mg, intravenous, After Dialysis  lactobacillus acidophilus, 1 tablet, oral, Daily  levothyroxine, 100 mcg, oral, Once per day on Mon  levothyroxine, 50 mcg, oral, Once per day on Sun Tue Wed Thu Fri Sat  metoprolol tartrate, 25 mg, oral, BID  midodrine, 10 mg, oral, TID  paricalcitol, 4 mcg, intravenous, After Dialysis  piperacillin-tazobactam, 2.25 g, intravenous, q8h  polyethylene glycol, 17 g, oral, Daily  potassium chloride CR, 20 mEq, oral, Daily  predniSONE, 20 mg, oral, Daily  sodium chloride, 200 mL, intravenous, After Dialysis  [START ON 11/22/2023] sodium chloride, 200 mL, intravenous, After Dialysis  sodium chloride, 250 mL, intravenous, After Dialysis  [Held by provider] torsemide, 20 mg, oral, Daily  triamcinolone, , Topical, BID      Continuous medications     PRN medications  PRN medications: acetaminophen, albumin human, albumin human, bisacodyl, dextrose 10 % in water (D10W), dextrose, glucagon, LORazepam, nystatin, oxygen, sodium chloride, sodium zirconium cyclosilicate, traMADol, zinc oxide   Results for orders placed or performed during the hospital encounter of 11/09/23 (from the past 24 hour(s))   POCT GLUCOSE   Result Value Ref Range    POCT Glucose 169 (H) 74 - 99 mg/dL   POCT GLUCOSE   Result Value Ref Range    POCT Glucose 192 (H) 74 - 99 mg/dL   CBC and Auto Differential   Result Value Ref Range    WBC 21.2 (H) 4.4 - 11.3 x10*3/uL    nRBC 4.7 (H) 0.0 - 0.0 /100 WBCs    RBC 2.78 (L) 4.00 - 5.20 x10*6/uL    Hemoglobin 7.4 (L) 12.0 - 16.0 g/dL    Hematocrit 26.3 (L) 36.0 - 46.0 %    MCV 95 80 - 100 fL    MCH 26.6 26.0 - 34.0 pg    MCHC 28.1 (L) 32.0 - 36.0 g/dL    RDW 18.9 (H) 11.5 - 14.5 %    Platelets 255 150 - 450 x10*3/uL    Immature Granulocytes %, Automated 5.4 (H) 0.0 - 0.9 %    Immature Granulocytes Absolute, Automated 1.15  (H) 0.00 - 0.50 x10*3/uL   Comprehensive metabolic panel   Result Value Ref Range    Glucose 193 (H) 74 - 99 mg/dL    Sodium 136 136 - 145 mmol/L    Potassium 4.4 3.5 - 5.3 mmol/L    Chloride 95 (L) 98 - 107 mmol/L    Bicarbonate 27 21 - 32 mmol/L    Anion Gap 18 10 - 20 mmol/L    Urea Nitrogen 37 (H) 6 - 23 mg/dL    Creatinine 2.21 (H) 0.50 - 1.05 mg/dL    eGFR 23 (L) >60 mL/min/1.73m*2    Calcium 8.1 (L) 8.6 - 10.3 mg/dL    Albumin 3.8 3.4 - 5.0 g/dL    Alkaline Phosphatase 116 33 - 136 U/L    Total Protein 5.6 (L) 6.4 - 8.2 g/dL    AST 39 9 - 39 U/L    Bilirubin, Total 0.9 0.0 - 1.2 mg/dL    ALT 61 (H) 7 - 45 U/L   Manual Differential   Result Value Ref Range    Neutrophils %, Manual 86.0 40.0 - 80.0 %    Bands %, Manual 6.0 0.0 - 5.0 %    Lymphocytes %, Manual 2.0 13.0 - 44.0 %    Monocytes %, Manual 3.0 2.0 - 10.0 %    Eosinophils %, Manual 0.0 0.0 - 6.0 %    Basophils %, Manual 0.0 0.0 - 2.0 %    Metamyelocytes %, Manual 1.0 0.0 - 0.0 %    Myelocytes %, Manual 2.0 0.0 - 0.0 %    Seg Neutrophils Absolute, Manual 18.23 (H) 1.60 - 5.00 x10*3/uL    Bands Absolute, Manual 1.27 (H) 0.00 - 0.50 x10*3/uL    Lymphocytes Absolute, Manual 0.42 (L) 0.80 - 3.00 x10*3/uL    Monocytes Absolute, Manual 0.64 0.05 - 0.80 x10*3/uL    Eosinophils Absolute, Manual 0.00 0.00 - 0.40 x10*3/uL    Basophils Absolute, Manual 0.00 0.00 - 0.10 x10*3/uL    Metamyelocytes Absolute, Manual 0.21 0.00 - 0.00 x10*3/uL    Myelocytes Absolute, Manual 0.42 0.00 - 0.00 x10*3/uL    Total Cells Counted 100     Neutrophils Absolute, Manual 19.50 (H) 1.60 - 5.50 x10*3/uL    RBC Morphology See Below     Polychromasia Mild     Hypochromia Mild     Ovalocytes Few     Beba Cells Few     Basophilic Stippling Present     Clumped Platelets Present    POCT GLUCOSE   Result Value Ref Range    POCT Glucose 179 (H) 74 - 99 mg/dL   POCT GLUCOSE   Result Value Ref Range    POCT Glucose 171 (H) 74 - 99 mg/dL       XR chest 1 view    Result Date:  11/15/2023  Interpreted By:  Johann Wood, STUDY: XR CHEST 1 VIEW;  11/14/2023 8:44 pm   INDICATION: Signs/Symptoms:Atelectasis.   COMPARISON: 11/09/2023   ACCESSION NUMBER(S): [id number]   ORDERING CLINICIAN: LINH RONQUILLO   FINDINGS: There is near-complete opacification of the left hemithorax with several air bronchograms centrally, and only aeration remaining at the apex. This has progressed from the prior exam and most likely due to an effusion with underlying consolidation. There is volume loss with shift of the heart mediastinum to the left. There is also blunting at the right cardio hepatic angle since the prior exam indicating an additional small right effusion.   ABDOMEN AND OTHER FINDINGS: No remarkable upper abdominal findings.   BONES: No acute osseous changes.       1.  Airspace disease as described.   Signed by: Johann Wood 11/15/2023 9:33 AM Dictation workstation:   [id number]    US renal complete    Result Date: 11/15/2023  Interpreted By:  Johann Wood, STUDY: US RENAL COMPLETE;  11/14/2023 8:42 pm   INDICATION: Signs/Symptoms:Renal failure.   COMPARISON: None.   ACCESSION NUMBER(S): [id number]   ORDERING CLINICIAN: LINH RONQUILLO   TECHNIQUE: Multiple images of the kidneys were obtained  , with color Doppler for blood flow.  It is noted the exam was limited due to patient habitus , limited mobility and continuous respiration.   FINDINGS: RIGHT KIDNEY: The right kidney measures 9.6 cm in length.  There is mild thinning of the renal cortex at the lower pole indicating mild atrophy. The remaining cortex appears maintained in homogeneous..  No hydronephrosis or evidence of nephrolithiasis. Color Doppler demonstrates renal blood flow.   LEFT KIDNEY: The left kidney could not be visualized, presumably secondary to limiting factors as above.   BLADDER: Not visualized, also probably due to limiting factors and under distension.   OTHER FINDINGS: None significant.       Mild right renal  cortical atrophy at the lower pole, otherwise no hydronephrosis. The left kidney and bladder were not visualized.   Signed by: Johann Wood 11/15/2023 8:39 AM Dictation workstation:   [id number]    XR chest 1 view    Result Date: 11/10/2023  STUDY: Chest Radiograph;  11/09/2023 10:56 PM INDICATION: Shortness of breath. COMPARISON: XR chest 03/16/2023, 03/15/2023.  ACCESSION NUMBER(S): [id number] ORDERING CLINICIAN: SACHIN WELDON TECHNIQUE:  Frontal chest was obtained at 2229 hours. FINDINGS: CARDIOMEDIASTINAL SILHOUETTE: Cardiomediastinal silhouette is markedly enlarged and stable in size and configuration.  LUNGS: Left-sided PICC line is noted terminating in the region of the proximal SVC/left subclavian junction.  Mild right basilar atelectasis.  Moderate left pleural effusion is suggested with underlying left basilar atelectasis and/or consolidation.  ABDOMEN: No remarkable upper abdominal findings.  BONES: No acute osseous changes.    Moderate left pleural effusion with left basilar atelectasis and/or consolidation.  Mild right basilar atelectasis. Signed by Nakul Rucker MD                            Assessment/Plan   #1 acute kidney injury superimposed on chronic kidney disease  #2 eosinophiluria suggesting allergic interstitial nephritis  #3 acute hyperkalemia  #4 GI bleed  #5 anasarca  #6 secondary hyperparathyroidism of renal origin with hyperphosphatemia ( PTH was 329)  #7 acute rhabdomyolysis  #8 pyocystitis  #9 atrial fibrillation  #10 hyperuricemia  #11 nonnephrotic range proteinuria  #12 cellulitis of left leg  #13 uremic encephalopathy  #14 uncontrolled diabetes  #15 autonomic neuropathic hypotension   Plan  Dialysis today  Continue to ultrafiltrate until dry weight is obtained    Thank you very much for allowing to participate in the care of this patient  Troy Patricio MD

## 2023-11-21 NOTE — NURSING NOTE
Report to Receiving RN:    Report To: Danielle  Time Report Called: [id number]  Hand-Off Communication: Yes  Complications During Treatment: Yes, patient's ox sat dropped into the 80s; pts o2 increased from 1 liter to 4 liters; sat increased low 90s; bp high and hr low; Dr. Patricio notified; tx ended early per Dr Patricio  Ultrafiltration Treatment: No  Medications Administered During Dialysis: No  Blood Products Administered During Dialysis: No  Labs Sent During Dialysis: No  Heparin Drip Rate Changes: No    Electronic Signatures:  Coretta Abraham RN (Signed)   Authored: Coretta Abraham RN   (Signed CE)   Authored: [email or web address]    Last Updated: 3:54 PM by CORETTA ABRAHAM

## 2023-11-21 NOTE — PROGRESS NOTES
Danielle Parisi is a 73 y.o. female on day 11 of admission presenting with Acute decompensated heart failure (CMS/HCC).      Subjective   Patient fully evaluated on November 11 and clinically improving.       Objective     Last Recorded Vitals  /58   Pulse 94   Temp 36.3 °C (97.3 °F)   Resp 18   Wt (!) 168 kg (370 lb 6 oz)   SpO2 96%   Intake/Output last 3 Shifts:    Intake/Output Summary (Last 24 hours) at 11/21/2023 1255  Last data filed at 11/21/2023 0109  Gross per 24 hour   Intake 150 ml   Output --   Net 150 ml         Admission Weight  Weight: 150 kg (330 lb) (11/09/23 2215)    Daily Weight  11/17/23 : (!) 168 kg (370 lb 6 oz)    Image Results  XR chest 1 view  Narrative: Interpreted By:  Johann Wood,   STUDY:  XR CHEST 1 VIEW;  11/21/2023 7:13 am      INDICATION:  Signs/Symptoms:Fluid overload/atelectasis.      COMPARISON:  11/20/2023      ACCESSION NUMBER(S):  [id number]      ORDERING CLINICIAN:  LINH RONQUILLO      FINDINGS:  There is opacification left hemithorax with shift of the heart  mediastinum to the left indicating volume loss similar to the prior  exam. Right lower lung infiltrate fairly stable, but with additional  or increased right central infiltrate which may be due to congestion  and/or pneumonia.      ABDOMEN AND OTHER FINDINGS:  No remarkable upper abdominal findings.      BONES:  No acute osseous changes.      Impression: 1.  Increased right lung infiltrate.      Signed by: Johann Wood 11/21/2023 9:17 AM  Dictation workstation:   [id number]  XR chest 1 view  Narrative: Interpreted By:  Johann Wood,   STUDY:  XR CHEST 1 VIEW;  11/20/2023 6:05 pm      INDICATION:  Signs/Symptoms:sob.      COMPARISON:  11/18/2023      ACCESSION NUMBER(S):  [id number]      ORDERING CLINICIAN:  EMILY SHAFFER      FINDINGS:  No significant change from the previous examination. Again there is  opacification left hemithorax is volume loss with shift of the heart  mediastinum to the left. This is  probably due to lung consolidation  and collapse, probably with effusion. Infiltrate in the right lower  lung may be due to pneumonia or congestion.      ABDOMEN AND OTHER FINDINGS:  No remarkable upper abdominal findings.      BONES:  No acute osseous changes.      Impression: 1.  No significant change.      Signed by: Johann Wood 11/21/2023 9:16 AM  Dictation workstation:   [id number]      Physical Exam    Relevant Results               Assessment/Plan   This patient currently has cardiac telemetry ordered; if you would like to modify or discontinue the telemetry order, click here to go to the orders activity to modify/discontinue the order.              Principal Problem:    Acute decompensated heart failure (CMS/HCC)          Thien Carcamo MD  Physician  Internal Medicine     H&P      Addendum     Date of Service: 11/10/2023  5:38 AM     Addendum       Expand All Collapse All    History Of Present Illness  Danielle Parisi is a 73 y.o. female presenting to emergency department from [facility] nursing facility for evaluation of increasing shortness of breath and volume overload.  Patient has a PICC line and is on IV Zosyn for recurrent UTI.  Over the past few days she has had a worsening shortness of breath.  She does admit to increasing lower extremity edema.  Patient is basically bedbound, denies fever but does admit to a cough.  Patient denies recent illness, nausea, vomiting, diarrhea.     In ED, , glucose 183, BUN 59, creatinine 1.46, GFR 38, AST 8, lactate 3.6 with a repeat pending.  WBCs 14.6, hemoglobin 8.3, hematocrit 29.7, troponin 17 with a repeat pending.  Chest x-ray completed showing moderate left pleural effusion with left basilar atelectasis, mild right basilar atelectasis per radiology review.  Last echocardiogram on file 2/27/2023 showing an ejection fraction of 60 to 65% with a dilated right atrium and severe tricuspid regurgitation.  EKG showing A-fib at a rate of 90 without ST elevation  "or depression per ER physician review.  Patient given Lasix IV in ED.  Consult placed to cardiology.  Patient admitted to telemetry under the care of Dr. Carcamo who will continue to follow.  I was asked to do H&P and place initial admission orders.     Past Medical History  Hypertension, A-fib on Eliquis, CONOR, hypothyroidism, lymphedema, diabetes, hyperlipidemia, history of DVT, obesity     Surgical History  Patient denies     Social History  She has no history on file for tobacco use, alcohol use, and drug use.     Family History  Reviewed and noncontributory     Allergies  Penicillins and Sulfa (sulfonamide antibiotics)     Review of Systems  A 10 point review of systems was completed and negative except what is listed in HPI  Physical Exam  Constitutional:       Appearance: She is obese.   HENT:      Mouth/Throat:      Mouth: Mucous membranes are dry.      Pharynx: Oropharynx is clear.   Eyes:      Pupils: Pupils are equal, round, and reactive to light.   Cardiovascular:      Rate and Rhythm: Rhythm irregularly irregular.   Pulmonary:      Breath sounds: Rales present.   Abdominal:      General: Bowel sounds are normal.   Musculoskeletal:         General: Normal range of motion.      Cervical back: Normal range of motion.   Skin:     General: Skin is warm and dry.      Capillary Refill: Capillary refill takes less than 2 seconds.   Neurological:      General: No focal deficit present.      Mental Status: She is alert. Mental status is at baseline.   Psychiatric:         Mood and Affect: Mood normal.         Behavior: Behavior normal.            Last Recorded Vitals  Blood pressure 102/56, pulse 104, temperature 35.8 °C (96.4 °F), resp. rate 18, height 1.702 m (5' 7\"), weight (!) 168 kg (370 lb 6 oz), SpO2 97 %.     Relevant Results  XR chest 1 view     Result Date: 11/10/2023  STUDY: Chest Radiograph;  11/09/2023 10:56 PM INDICATION: Shortness of breath. COMPARISON: XR chest 03/16/2023, 03/15/2023.  ACCESSION " NUMBER(S): [id number] ORDERING CLINICIAN: SACHIN WELDON TECHNIQUE:  Frontal chest was obtained at 2229 hours. FINDINGS: CARDIOMEDIASTINAL SILHOUETTE: Cardiomediastinal silhouette is markedly enlarged and stable in size and configuration.  LUNGS: Left-sided PICC line is noted terminating in the region of the proximal SVC/left subclavian junction.  Mild right basilar atelectasis.  Moderate left pleural effusion is suggested with underlying left basilar atelectasis and/or consolidation.  ABDOMEN: No remarkable upper abdominal findings.  BONES: No acute osseous changes.     Moderate left pleural effusion with left basilar atelectasis and/or consolidation.  Mild right basilar atelectasis. Signed by Nakul Rucker MD                  Results for orders placed or performed during the hospital encounter of 11/09/23 (from the past 24 hour(s))   CBC and Auto Differential   Result Value Ref Range     WBC 14.6 (H) 4.4 - 11.3 x10*3/uL     nRBC 0.3 (H) 0.0 - 0.0 /100 WBCs     RBC 3.15 (L) 4.00 - 5.20 x10*6/uL     Hemoglobin 8.3 (L) 12.0 - 16.0 g/dL     Hematocrit 29.7 (L) 36.0 - 46.0 %     MCV 94 80 - 100 fL     MCH 26.3 26.0 - 34.0 pg     MCHC 27.9 (L) 32.0 - 36.0 g/dL     RDW 17.8 (H) 11.5 - 14.5 %     Platelets 306 150 - 450 x10*3/uL     Neutrophils % 92.8 40.0 - 80.0 %     Immature Granulocytes %, Automated 1.0 (H) 0.0 - 0.9 %     Lymphocytes % 2.6 13.0 - 44.0 %     Monocytes % 3.4 2.0 - 10.0 %     Eosinophils % 0.1 0.0 - 6.0 %     Basophils % 0.1 0.0 - 2.0 %     Neutrophils Absolute 13.55 (H) 1.60 - 5.50 x10*3/uL     Immature Granulocytes Absolute, Automated 0.15 0.00 - 0.50 x10*3/uL     Lymphocytes Absolute 0.38 (L) 0.80 - 3.00 x10*3/uL     Monocytes Absolute 0.50 0.05 - 0.80 x10*3/uL     Eosinophils Absolute 0.01 0.00 - 0.40 x10*3/uL     Basophils Absolute 0.02 0.00 - 0.10 x10*3/uL   Magnesium   Result Value Ref Range     Magnesium 1.67 1.60 - 2.40 mg/dL   Comprehensive metabolic panel   Result Value Ref Range      Glucose 183 (H) 74 - 99 mg/dL     Sodium 142 136 - 145 mmol/L     Potassium 4.0 3.5 - 5.3 mmol/L     Chloride 102 98 - 107 mmol/L     Bicarbonate 32 21 - 32 mmol/L     Anion Gap 12 10 - 20 mmol/L     Urea Nitrogen 59 (H) 6 - 23 mg/dL     Creatinine 1.46 (H) 0.50 - 1.05 mg/dL     eGFR 38 (L) >60 mL/min/1.73m*2     Calcium 8.5 (L) 8.6 - 10.3 mg/dL     Albumin 3.0 (L) 3.4 - 5.0 g/dL     Alkaline Phosphatase 89 33 - 136 U/L     Total Protein 5.6 (L) 6.4 - 8.2 g/dL     AST 8 (L) 9 - 39 U/L     Bilirubin, Total 0.3 0.0 - 1.2 mg/dL     ALT 25 7 - 45 U/L   Protime-INR   Result Value Ref Range     Protime 11.6 9.8 - 12.8 seconds     INR 1.0 0.9 - 1.1   aPTT   Result Value Ref Range     aPTT 30 27 - 38 seconds   Lactate   Result Value Ref Range     Lactate 3.6 (H) 0.4 - 2.0 mmol/L   Type And Screen   Result Value Ref Range     ABO TYPE O       Rh TYPE POS       ANTIBODY SCREEN NEG     Troponin I, High Sensitivity   Result Value Ref Range     Troponin I, High Sensitivity 17 (H) 0 - 13 ng/L   B-Type Natriuretic Peptide   Result Value Ref Range      (H) 0 - 99 pg/mL   Blood Gas Venous   Result Value Ref Range     POCT pH, Venous 7.30 (L) 7.33 - 7.43 pH     POCT pCO2, Venous 64 (H) 41 - 51 mm Hg     POCT pO2, Venous 36 35 - 45 mm Hg     POCT SO2, Venous 61 45 - 75 %     POCT Oxy Hemoglobin, Venous 59.0 45.0 - 75.0 %     POCT Base Excess, Venous 3.2 (H) -2.0 - 3.0 mmol/L     POCT HCO3 Calculated, Venous 31.5 (H) 22.0 - 26.0 mmol/L     Patient Temperature 37.0 degrees Celsius     FiO2 36 %     Test Comment ICU-S     Urinalysis with Reflex Microscopic and Culture   Result Value Ref Range     Color, Urine Yellow Straw, Yellow     Appearance, Urine Hazy (N) Clear     Specific Gravity, Urine 1.015 1.005 - 1.035     pH, Urine 5.0 5.0, 5.5, 6.0, 6.5, 7.0, 7.5, 8.0     Protein, Urine NEGATIVE NEGATIVE mg/dL     Glucose, Urine NEGATIVE NEGATIVE mg/dL     Blood, Urine MODERATE (2+) (A) NEGATIVE     Ketones, Urine NEGATIVE NEGATIVE  mg/dL     Bilirubin, Urine NEGATIVE NEGATIVE     Urobilinogen, Urine <2.0 <2.0 mg/dL     Nitrite, Urine NEGATIVE NEGATIVE     Leukocyte Esterase, Urine LARGE (3+) (A) NEGATIVE   Microscopic Only, Urine   Result Value Ref Range     WBC, Urine >50 (A) 1-5, NONE /HPF     WBC Clumps, Urine FEW Reference range not established. /HPF     RBC, Urine >20 (A) NONE, 1-2, 3-5 /HPF     Squamous Epithelial Cells, Urine 1-9 (SPARSE) Reference range not established. /HPF     Transitional Epithelial Cells, Urine 1-2 (FEW) Reference range not established. /HPF     Bacteria, Urine 1+ (A) NONE SEEN /HPF     Budding Yeast, Urine PRESENT (A) NONE /HPF     Mucus, Urine 1+ Reference range not established. /LPF     Hyaline Casts, Urine 2+ (A) NONE /LPF            Assessment/Plan   Danielle is a 73-year-old female patient presenting to emergency department for evaluation of shortness of breath and volume overload.  Patient found to have a BNP of 116, elevated troponin, elevated lactate, elevated WBCs.  Imaging showing moderate left pleural effusion with left basilar atelectasis and mild right basilar atelectasis.  Last echocardiogram on file 2/27/2023 with an EF of 60 to 65%.  Patient having new oxygen requirements moving from 2 L/min to 4 L/min.  Patient currently receiving IV Zosyn via PICC line at [facility] nursing facility for recurrent UTI.  Patient found to be UTI positive, no culture sent due to being on antibiotics chronically.  Patient given IV Lasix for volume overload in ED.  Consult placed to cardiology.  Admission for further medical management.     Acute decompensated heart failure  Admit to telemetry per Dr. Carcamo  Intake and output  See imaging results above  Daily weight  Lasix IV 40 mg in ED  Consult cardiology and appreciate input  Low-sodium diet  Fluid restriction  Chronic Cruz catheter     Diabetes/hypothyroidism/hypertension/A-fib/hyperlipidemia     Diabetic diet  ISS  Hypoglycemia protocol  Hold home oral antidiabetic  medications when med rec is complete  Continue home medications  Continue home Eliquis when med rec is complete     DVT PPx  Continue home Eliquis when med rec is complete  SCDs  Activity as tolerated     I spent 30 minutes in the professional and overall care of this patient.  Patient fully evaluated on November, plan as above.                      Revision History          Patient fully evaluated on November 11.  Debrox for bilateral ear cerumen impactions.  Continue to diurese patient and recheck labs in AM.     Patient fully evaluated on November 12.  Continue aggressive diuresis.  BUN and creatinine are stable.  Recheck labs in AM.    Patient fully evaluated on November 13.  Rise in BUN and creatinine noted and patient will transition to oral diuretics.  Recheck labs in AM.  Recheck chemistries in a.m. to check for renal function  Patient fully evaluated on November 14 and rise in BUN and creatinine noted.  Diuretics have been held as well as ARB's.  Nephrology consultation obtained.  Recheck labs in a.m.    Patient fully evaluated on November 15.  Continue to monitor BUN and creatinine diuretics been held.    Patient fully evaluated on November 16.  Continue to monitor H&H which drop was noted.  Eliquis has been held.  Continued acute kidney injury noted and appreciate nephrology consultation.  Recheck labs in AM.    Patient fully evaluated on November 17.  IV Lasix drip initiated for diuresis.  Patient has significant end-stage renal disease.    Patient fully evaluated on November 18 and consideration for dialysis.  Permacath to be placed today by interventional radiology.  Continue to monitor lab work and monitor for further fluid overload.  Patient has 2+ edema with significant anasarca.    Patient fully evaluated on November 19 and more awake and alert today.  Continue dialysis daily for now.  Pleural effusion noted which might be resolved with dialysis.  Continue to monitor for further fluid overload and  recheck labs in AM.    Patient fully evaluated on November 20.  Still very lethargic.  Continue to monitor BUN and creatinine.  Repeat chest x-ray ordered.    Patient fully evaluated on November 21 and having hallucinations.  White count is still elevated with possible pneumonia.  Pulmonary consultation for pleural effusion versus consolidation.  IV vancomycin and Merrem initiated.  Recheck labs in AM.  Case discussed with nephrology.  Thien Carcamo MD

## 2023-11-21 NOTE — CARE PLAN
The patient's goals for the shift include rest and comfort    The clinical goals for the shift include patients BP will remain stable    Over the shift, the patient remained comfortable and pain free

## 2023-11-21 NOTE — CONSULTS
Consults    Reason For Consult  Worsening mental status, acute on chronic hypoxic respiratory failure    History Of Present Illness  Danielle Parisi is a 73 y.o. female with PMH of Hypertension, A-fib on Eliquis, CONOR, hypothyroidism, HFpEF,lymphedema, diabetes, hyperlipidemia, history of DVT,FRANC, OHA, CKD,Chronic UTIs with urinary retention with chronic vidal and  morbid obesity(bed bound), who was admitted to the hospital on 11/10 from nursing facility with SOB and volume overload 2/2 acute on chronic HFpEF(with predominant right-sided features ). Cardiology was consulted, gentle diuresis was started, but was held due to worsening S.creat. Cardizem was held for relative hypotension. Nephrology was consulted, patient's renal function continued to deteriorate, and she was started on dialysis on 11/18. Patient had 4 sessions of dialysis. Patient was more confused today following her dialysis session, with hypertension and more SOB with tachycardia. Patient was transferred to the ICU for further management.     Past Medical History  Hypertension, A-fib on Eliquis, CONOR, hypothyroidism, HFpEF,lymphedema, diabetes, hyperlipidemia, history of DVT,FRANC, OHA, CKD,Chronic UTIs with urinary retention with chronic vidal and  morbid obesity(bed bound)    Surgical History  She has a past surgical history that includes IR CVC nontunneled (11/18/2023).     Social History  She reports that she has never smoked. She has never used smokeless tobacco. She reports that she does not drink alcohol and does not use drugs.    Family History  No family history on file.     Allergies  Penicillins and Sulfa (sulfonamide antibiotics)    Review of Systems  Review of Systems   Reason unable to perform ROS: Patient is disoriented.     Temp:  [35.3 °C (95.5 °F)-36.3 °C (97.3 °F)] 36.2 °C (97.2 °F)  Heart Rate:  [] 107  Resp:  [18-20] 18  BP: (108-127)/(58-79) 127/58  I/O last 3 completed shifts:  In: 2350 (14 mL/kg) [I.V.:1800 (10.7 mL/kg);  Other:400; IV Piggyback:150]  Out: 2000 (11.9 mL/kg) [Other:2000]  Weight: 168 kg   No intake/output data recorded.  Physical Exam  Constitutional:       General: She is in acute distress.      Appearance: She is obese. She is ill-appearing.   HENT:      Head: Normocephalic and atraumatic.      Nose: Nose normal.      Mouth/Throat:      Mouth: Mucous membranes are moist.      Pharynx: Oropharynx is clear.   Eyes:      Conjunctiva/sclera: Conjunctivae normal.   Cardiovascular:      Rate and Rhythm: Tachycardia present. Rhythm irregular.      Pulses: Normal pulses.      Heart sounds: Normal heart sounds.   Pulmonary:      Effort: Pulmonary effort is normal.      Breath sounds: Normal breath sounds.   Abdominal:      General: Abdomen is flat. Bowel sounds are normal.      Palpations: Abdomen is soft.   Musculoskeletal:      Cervical back: Normal range of motion and neck supple.      Right lower leg: Edema present.      Left lower leg: Edema present.   Skin:     General: Skin is warm and dry.      Findings: Bruising present.   Neurological:      Mental Status: She is disoriented.       Principal Problem:    Acute decompensated heart failure (CMS/HCC)             Last Recorded Vitals  /58   Pulse 107   Temp 36.2 °C (97.2 °F) (Temporal)   Resp 18   Wt (!) 168 kg (370 lb 6 oz)   SpO2 96%     Relevant Results  Results for orders placed or performed during the hospital encounter of 11/09/23 (from the past 24 hour(s))   POCT GLUCOSE   Result Value Ref Range    POCT Glucose 192 (H) 74 - 99 mg/dL   CBC and Auto Differential   Result Value Ref Range    WBC 21.2 (H) 4.4 - 11.3 x10*3/uL    nRBC 4.7 (H) 0.0 - 0.0 /100 WBCs    RBC 2.78 (L) 4.00 - 5.20 x10*6/uL    Hemoglobin 7.4 (L) 12.0 - 16.0 g/dL    Hematocrit 26.3 (L) 36.0 - 46.0 %    MCV 95 80 - 100 fL    MCH 26.6 26.0 - 34.0 pg    MCHC 28.1 (L) 32.0 - 36.0 g/dL    RDW 18.9 (H) 11.5 - 14.5 %    Platelets 255 150 - 450 x10*3/uL    Immature Granulocytes %, Automated 5.4  (H) 0.0 - 0.9 %    Immature Granulocytes Absolute, Automated 1.15 (H) 0.00 - 0.50 x10*3/uL   Comprehensive metabolic panel   Result Value Ref Range    Glucose 193 (H) 74 - 99 mg/dL    Sodium 136 136 - 145 mmol/L    Potassium 4.4 3.5 - 5.3 mmol/L    Chloride 95 (L) 98 - 107 mmol/L    Bicarbonate 27 21 - 32 mmol/L    Anion Gap 18 10 - 20 mmol/L    Urea Nitrogen 37 (H) 6 - 23 mg/dL    Creatinine 2.21 (H) 0.50 - 1.05 mg/dL    eGFR 23 (L) >60 mL/min/1.73m*2    Calcium 8.1 (L) 8.6 - 10.3 mg/dL    Albumin 3.8 3.4 - 5.0 g/dL    Alkaline Phosphatase 116 33 - 136 U/L    Total Protein 5.6 (L) 6.4 - 8.2 g/dL    AST 39 9 - 39 U/L    Bilirubin, Total 0.9 0.0 - 1.2 mg/dL    ALT 61 (H) 7 - 45 U/L   Manual Differential   Result Value Ref Range    Neutrophils %, Manual 86.0 40.0 - 80.0 %    Bands %, Manual 6.0 0.0 - 5.0 %    Lymphocytes %, Manual 2.0 13.0 - 44.0 %    Monocytes %, Manual 3.0 2.0 - 10.0 %    Eosinophils %, Manual 0.0 0.0 - 6.0 %    Basophils %, Manual 0.0 0.0 - 2.0 %    Metamyelocytes %, Manual 1.0 0.0 - 0.0 %    Myelocytes %, Manual 2.0 0.0 - 0.0 %    Seg Neutrophils Absolute, Manual 18.23 (H) 1.60 - 5.00 x10*3/uL    Bands Absolute, Manual 1.27 (H) 0.00 - 0.50 x10*3/uL    Lymphocytes Absolute, Manual 0.42 (L) 0.80 - 3.00 x10*3/uL    Monocytes Absolute, Manual 0.64 0.05 - 0.80 x10*3/uL    Eosinophils Absolute, Manual 0.00 0.00 - 0.40 x10*3/uL    Basophils Absolute, Manual 0.00 0.00 - 0.10 x10*3/uL    Metamyelocytes Absolute, Manual 0.21 0.00 - 0.00 x10*3/uL    Myelocytes Absolute, Manual 0.42 0.00 - 0.00 x10*3/uL    Total Cells Counted 100     Neutrophils Absolute, Manual 19.50 (H) 1.60 - 5.50 x10*3/uL    RBC Morphology See Below     Polychromasia Mild     Hypochromia Mild     Ovalocytes Few     [place] Cells Few     Basophilic Stippling Present     Clumped Platelets Present    POCT GLUCOSE   Result Value Ref Range    POCT Glucose 179 (H) 74 - 99 mg/dL   POCT GLUCOSE   Result Value Ref Range    POCT Glucose 171 (H) 74 -  99 mg/dL   POCT GLUCOSE   Result Value Ref Range    POCT Glucose 128 (H) 74 - 99 mg/dL   Blood Gas Arterial Full Panel   Result Value Ref Range    POCT pH, Arterial 7.35 (L) 7.38 - 7.42 pH    POCT pCO2, Arterial 50 (H) 38 - 42 mm Hg    POCT pO2, Arterial 70 (L) 85 - 95 mm Hg    POCT SO2, Arterial 96 94 - 100 %    POCT Oxy Hemoglobin, Arterial 92.3 (L) 94.0 - 98.0 %    POCT Hematocrit Calculated, Arterial 24.0 (L) 36.0 - 46.0 %    POCT Sodium, Arterial 134 (L) 136 - 145 mmol/L    POCT Potassium, Arterial 3.9 3.5 - 5.3 mmol/L    POCT Chloride, Arterial 100 98 - 107 mmol/L    POCT Ionized Calcium, Arterial 1.07 (L) 1.10 - 1.33 mmol/L    POCT Glucose, Arterial 132 (H) 74 - 99 mg/dL    POCT Lactate, Arterial 2.2 (H) 0.4 - 2.0 mmol/L    POCT Base Excess, Arterial 1.6 -2.0 - 3.0 mmol/L    POCT HCO3 Calculated, Arterial 27.6 (H) 22.0 - 26.0 mmol/L    POCT Hemoglobin, Arterial 8.0 (L) 12.0 - 16.0 g/dL    POCT Anion Gap, Arterial 10 10 - 25 mmo/L    Patient Temperature 37.0 degrees Celsius    FiO2 40 %     Scheduled medications  [MAR Hold] busPIRone, 5 mg, oral, BID  [MAR Hold] carbamide peroxide, 10 drop, Each Ear, BID  [Held by provider] dilTIAZem CD, 240 mg, oral, Daily  [MAR Hold] epoetin frankie or biosimilar, 20,000 Units, subcutaneous, Once per day on Mon Wed Fri  [MAR Hold] famotidine, 20 mg, oral, Daily  [MAR Hold] fludrocortisone, 0.1 mg, oral, Daily  [Held by provider] furosemide, 40 mg, intravenous, q12h  [MAR Hold] gabapentin, 100 mg, oral, Daily  [MAR Hold] heparin, 2,000 Units, intravenous, After Dialysis  [MAR Hold] heparin, 2,000 Units, intra-catheter, After Dialysis  [MAR Hold] heparin, 2,000 Units, intra-catheter, After Dialysis  [MAR Hold] heparin, 2,200 Units, intra-catheter, After Dialysis  [MAR Hold] heparin, 2,400 Units, intra-catheter, After Dialysis  [MAR Hold] insulin lispro, 0-15 Units, subcutaneous, TID with meals  [MAR Hold] iron sucrose, 50 mg, intravenous, After Dialysis  [MAR Hold] lactobacillus  "acidophilus, 1 tablet, oral, Daily  [MAR Hold] levothyroxine, 100 mcg, oral, Once per day on Mon  [MAR Hold] levothyroxine, 50 mcg, oral, Once per day on Sun Tue Wed Thu Fri Sat  meropenem, 500 mg, intravenous, q24h  [MAR Hold] metoprolol tartrate, 25 mg, oral, BID  [MAR Hold] midodrine, 10 mg, oral, TID  [MAR Hold] paricalcitol, 4 mcg, intravenous, After Dialysis  [MAR Hold] polyethylene glycol, 17 g, oral, Daily  [MAR Hold] potassium chloride CR, 20 mEq, oral, Daily  predniSONE, 20 mg, oral, Daily  [MAR Hold] sodium chloride, 200 mL, intravenous, After Dialysis  [MAR Hold] sodium chloride, 200 mL, intravenous, After Dialysis  [MAR Hold] sodium chloride, 250 mL, intravenous, After Dialysis  [Held by provider] torsemide, 20 mg, oral, Daily  [MAR Hold] triamcinolone, , Topical, BID      Continuous medications     PRN medications  PRN medications: [MAR Hold] acetaminophen, [MAR Hold] albumin human, [MAR Hold] albumin human, [MAR Hold] bisacodyl, [MAR Hold] dextrose 10 % in water (D10W), [MAR Hold] dextrose, [MAR Hold] glucagon, [MAR Hold] LORazepam, [MAR Hold] nystatin, [MAR Hold] oxygen, [MAR Hold] sodium chloride, [MAR Hold] sodium zirconium cyclosilicate, [MAR Hold] traMADol, [MAR Hold] zinc oxide    IR CVC nontunneled    Result Date: 11/21/2023  Interpreted By:  Christy Redd, STUDY: IR CVC NONTUNNELED;  11/18/2023 1:55 pm   INDICATION: Signs/Symptoms:Renal failure.   COMPARISON: None.   ACCESSION NUMBER(S): [id number]   ORDERING CLINICIAN: LINH RONQUILLO   TECHNIQUE: INTERVENTIONALIST(S): Christy Redd MD   The history and physical exam pertinent to the procedure were reviewed and no updates were made.\"   CONSENT: The patient/patient's POA/next of kin was informed of the nature of the proposed procedure. The purposes, alternatives, risks, and benefits were explained and discussed. All questions were answered and consent was obtained.   RADIATION EXPOSURE: None   SEDATION: None   MEDICATION/CONTRAST: No " additional.   TIME OUT: A time out was performed immediately prior to procedure start with the interventional team, correctly identifying the patient name, date of birth, MRN, procedure, anatomy (including marking of site and side), patient position, procedure consent form, relevant laboratory and imaging test results, antibiotic administration, safety precautions, and procedure-specific equipment needs.   COMPLICATIONS: No immediate adverse events identified.   FINDINGS: Maximum sterile barrier technique was implemented. Due to patient's size and limited tolerance for the procedure, this was performed using ultrasound guidance only in the patient bed. Follow up chest x-rays performed to confirm appropriate placement of the catheter.. The  right supraclavicular and infraclavicular cutaneous tissues were prepared and draped in usual sterile manner. 1% Lidocaine was instilled into the subcutaneous soft tissues at the access site and tunnel tract for local anesthesia. Ultrasound images demonstrate a patent  right internal jugular vein. Under direct ultrasound guidance, a micropuncture needle was used to access the  right internal jugular vein utilizing Seldinger and micropuncture technique. An ultrasound digital spot image was acquired to confirm location and stored on the  PACS.   After confirmation of location, a 018 [place] guidewire was introduced through the access needle to secure and confirm location. There was subsequent exchange and placement over the wire for a coaxial 5-[place] dilator sheath system. The inner dilator and wire were removed with the sheath left in place. A 035  [place] guidewire was introduced to the sheath with access into the inferior vena cava. This was followed by serial soft tissue dilatation using 10 and 12 [place] vascular dilators followed by placement of a power Trialysis catheter, 20 cm. This was secured using non resorbable suture and adhesive dressing.   The catheter ports  were aspirated and flushed without resistance with normal saline. The dialysis catheter ports were then charged with high-concentration heparin. The infusion port was flushed using heparinized saline. The patient tolerated the procedure without complication.       1. Technically crash that technically successful placement non tunneled 20 cm tri fusion catheter, right IJ, using ultrasound guidance   I was present for and/or performed the critical portions of the procedure and immediately available throughout the entire procedure.   I personally reviewed the image(s) / study and resident interpretation. I agree with the findings as stated.   Performed and dictated at [facility].   MACRO: None   Signed by: Christy Redd 11/21/2023 2:01 PM Dictation workstation:   [id number]    XR chest 1 view    Result Date: 11/21/2023  Interpreted By:  Johann Wood, STUDY: XR CHEST 1 VIEW;  11/21/2023 7:13 am   INDICATION: Signs/Symptoms:Fluid overload/atelectasis.   COMPARISON: 11/20/2023   ACCESSION NUMBER(S): [id number]   ORDERING CLINICIAN: LINH RONQUILLO   FINDINGS: There is opacification left hemithorax with shift of the heart mediastinum to the left indicating volume loss similar to the prior exam. Right lower lung infiltrate fairly stable, but with additional or increased right central infiltrate which may be due to congestion and/or pneumonia.   ABDOMEN AND OTHER FINDINGS: No remarkable upper abdominal findings.   BONES: No acute osseous changes.       1.  Increased right lung infiltrate.   Signed by: Johann Wood 11/21/2023 9:17 AM Dictation workstation:   [id number]    XR chest 1 view    Result Date: 11/21/2023  Interpreted By:  Johann Wood, STUDY: XR CHEST 1 VIEW;  11/20/2023 6:05 pm   INDICATION: Signs/Symptoms:sob.   COMPARISON: 11/18/2023   ACCESSION NUMBER(S): [id number]   ORDERING CLINICIAN: EMILY SHAFFER   FINDINGS: No significant change from the previous examination. Again  there is opacification left hemithorax is volume loss with shift of the heart mediastinum to the left. This is probably due to lung consolidation and collapse, probably with effusion. Infiltrate in the right lower lung may be due to pneumonia or congestion.   ABDOMEN AND OTHER FINDINGS: No remarkable upper abdominal findings.   BONES: No acute osseous changes.       1.  No significant change.   Signed by: Johann Wood 11/21/2023 9:16 AM Dictation workstation:   [id number]    XR chest 1 view    Result Date: 11/18/2023  Interpreted By:  Radha Peres, STUDY: XR CHEST 1 VIEW;  11/18/2023 1:35 pm   INDICATION: Signs/Symptoms:post dialysis catheter.   COMPARISON: 11/14/2023   ACCESSION NUMBER(S): [id number]   ORDERING CLINICIAN: MERI RANKIN   FINDINGS: CARDIOMEDIASTINAL SILHOUETTE: Cardiomediastinal silhouette is normal in size and configuration. There is a right subclavian large bore catheter with the tip in the superior vena cava. There is a left PICC line with the tip in the left brachiocephalic vein.   LUNGS: Again there is complete opacification of the left hemithorax which could represent collapse of the left lung or large effusion. There is mediastinal shift to the left suggesting volume loss at the left lung. There is progressive consolidation at the right lung base.   ABDOMEN: No remarkable upper abdominal findings.     BONES: No acute osseous changes.       1. Complete opacification left hemithorax, unchanged compared to 11/14/2023, likely left lung collapse. 2. Progressive pneumonia right lung base.   MACRO: None   Signed by: Radha Peres 11/18/2023 1:46 PM Dictation workstation:   [id number]    XR chest 1 view    Result Date: 11/15/2023  Interpreted By:  Johann Wood, STUDY: XR CHEST 1 VIEW;  11/14/2023 8:44 pm   INDICATION: Signs/Symptoms:Atelectasis.   COMPARISON: 11/09/2023   ACCESSION NUMBER(S): [id number]   ORDERING CLINICIAN: LINH RONQUILLO   FINDINGS: There is near-complete  opacification of the left hemithorax with several air bronchograms centrally, and only aeration remaining at the apex. This has progressed from the prior exam and most likely due to an effusion with underlying consolidation. There is volume loss with shift of the heart mediastinum to the left. There is also blunting at the right cardio hepatic angle since the prior exam indicating an additional small right effusion.   ABDOMEN AND OTHER FINDINGS: No remarkable upper abdominal findings.   BONES: No acute osseous changes.       1.  Airspace disease as described.   Signed by: Johann Wood 11/15/2023 9:33 AM Dictation workstation:   [id number]    US renal complete    Result Date: 11/15/2023  Interpreted By:  Johann Wood, STUDY: US RENAL COMPLETE;  11/14/2023 8:42 pm   INDICATION: Signs/Symptoms:Renal failure.   COMPARISON: None.   ACCESSION NUMBER(S): [id number]   ORDERING CLINICIAN: LINH RONQUILLO   TECHNIQUE: Multiple images of the kidneys were obtained  , with color Doppler for blood flow.  It is noted the exam was limited due to patient habitus , limited mobility and continuous respiration.   FINDINGS: RIGHT KIDNEY: The right kidney measures 9.6 cm in length.  There is mild thinning of the renal cortex at the lower pole indicating mild atrophy. The remaining cortex appears maintained in homogeneous..  No hydronephrosis or evidence of nephrolithiasis. Color Doppler demonstrates renal blood flow.   LEFT KIDNEY: The left kidney could not be visualized, presumably secondary to limiting factors as above.   BLADDER: Not visualized, also probably due to limiting factors and under distension.   OTHER FINDINGS: None significant.       Mild right renal cortical atrophy at the lower pole, otherwise no hydronephrosis. The left kidney and bladder were not visualized.   Signed by: Johann Wood 11/15/2023 8:39 AM Dictation workstation:   [id number]    XR chest 1 view    Result Date: 11/10/2023  STUDY: Chest Radiograph;   11/09/2023 10:56 PM INDICATION: Shortness of breath. COMPARISON: XR chest 03/16/2023, 03/15/2023.  ACCESSION NUMBER(S): [id number] ORDERING CLINICIAN: SACHIN WELDON TECHNIQUE:  Frontal chest was obtained at 2229 hours. FINDINGS: CARDIOMEDIASTINAL SILHOUETTE: Cardiomediastinal silhouette is markedly enlarged and stable in size and configuration.  LUNGS: Left-sided PICC line is noted terminating in the region of the proximal SVC/left subclavian junction.  Mild right basilar atelectasis.  Moderate left pleural effusion is suggested with underlying left basilar atelectasis and/or consolidation.  ABDOMEN: No remarkable upper abdominal findings.  BONES: No acute osseous changes.    Moderate left pleural effusion with left basilar atelectasis and/or consolidation.  Mild right basilar atelectasis. Signed by Nakul Rucker MD       Assessment/Plan   Danielle Parisi is a 73 y.o. female with PMH of Hypertension, A-fib on Eliquis, CONOR, hypothyroidism, HFpEF,lymphedema, diabetes, hyperlipidemia, history of DVT,FRANC, OHA, CKD,Chronic UTIs with urinary retention with chronic vidal and  morbid obesity(bed bound), who was admitted to the hospital on 11/10 from nursing facility with SOB and volume overload 2/2 acute on chronic HFpEF(with predominant right-sided features ). Patient developed CONOR requiring dialysis, she received 4 sessions of dialysis so far since 11/18, she was transferred  to the ICU for worsening mental status, hypertension with worsening oxygen requirements following dialysis      Isaiah Oro DO      [facility] attending:  I have seen this pt independently of the resident.  Data and exam as above.    CT chest appears with L sided loculated effusions, but awaiting official reading of image.    A/P:   CHFpEF, CONOR on CKD, morbid obesity, L pleural effusion, recurrent UTI, afib but AC on hold, DM, encephalopathy, FRANC/OHS    Recs: hemodynamic monitoring, cardio following, diuresis on hold for now,  AC on hold for  possible procedure, recall cardio, supplemental O2 PRN, ABG PRN, follow CT chest, consider IR for thoracentesis, GI and DVT prophylaxis, continue empiric abx, follow cultures, LA PRN, trend Cr and I+Os, renal following for RRT, glycemic monitoring, continues on prednisone/fludrocortisone/LT4 replacements, glycemic monitoring, pain control, neuro checks, consider CT head if remains confused.    DAYAMI QUIÑONES, DO    CCM time today > 50 mins without procedures.

## 2023-11-22 ENCOUNTER — HOSPITAL ENCOUNTER (INPATIENT)
Facility: HOSPITAL | Age: 73
LOS: 1 days | DRG: 291 | End: 2023-11-23
Attending: INTERNAL MEDICINE | Admitting: INTERNAL MEDICINE
Payer: OTHER MISCELLANEOUS

## 2023-11-22 VITALS
RESPIRATION RATE: 20 BRPM | OXYGEN SATURATION: 96 % | HEART RATE: 94 BPM | DIASTOLIC BLOOD PRESSURE: 78 MMHG | TEMPERATURE: 95 F | HEIGHT: 67 IN | BODY MASS INDEX: 45.99 KG/M2 | WEIGHT: 293 LBS | SYSTOLIC BLOOD PRESSURE: 116 MMHG

## 2023-11-22 DIAGNOSIS — N18.6 ESRD (END STAGE RENAL DISEASE) (MULTI): Primary | ICD-10-CM

## 2023-11-22 LAB
ALBUMIN SERPL BCP-MCNC: 3.7 G/DL (ref 3.4–5)
ALP SERPL-CCNC: 195 U/L (ref 33–136)
ALT SERPL W P-5'-P-CCNC: 261 U/L (ref 7–45)
AMMONIA PLAS-SCNC: 53 UMOL/L (ref 16–53)
ANION GAP BLDA CALCULATED.4IONS-SCNC: 7 MMO/L (ref 10–25)
ANION GAP SERPL CALC-SCNC: 18 MMOL/L (ref 10–20)
APPARATUS: ABNORMAL
AST SERPL W P-5'-P-CCNC: 153 U/L (ref 9–39)
BASE EXCESS BLDA CALC-SCNC: 1.3 MMOL/L (ref -2–3)
BILIRUB SERPL-MCNC: 1.2 MG/DL (ref 0–1.2)
BODY TEMPERATURE: 37 DEGREES CELSIUS
BUN SERPL-MCNC: 36 MG/DL (ref 6–23)
CA-I BLDA-SCNC: 1.23 MMOL/L (ref 1.1–1.33)
CALCIUM SERPL-MCNC: 8.5 MG/DL (ref 8.6–10.3)
CHLORIDE BLDA-SCNC: 98 MMOL/L (ref 98–107)
CHLORIDE SERPL-SCNC: 96 MMOL/L (ref 98–107)
CO2 SERPL-SCNC: 28 MMOL/L (ref 21–32)
CREAT SERPL-MCNC: 2.19 MG/DL (ref 0.5–1.05)
CRITICAL CALL TIME: 1015
CRITICAL CALLED BY: ABNORMAL
CRITICAL CALLED TO: ABNORMAL
CRITICAL READ BACK: ABNORMAL
ERYTHROCYTE [DISTWIDTH] IN BLOOD BY AUTOMATED COUNT: 19.5 % (ref 11.5–14.5)
GFR SERPL CREATININE-BSD FRML MDRD: 23 ML/MIN/1.73M*2
GLUCOSE BLDA-MCNC: 157 MG/DL (ref 74–99)
GLUCOSE SERPL-MCNC: 149 MG/DL (ref 74–99)
HCO3 BLDA-SCNC: 31.3 MMOL/L (ref 22–26)
HCT VFR BLD AUTO: 26.2 % (ref 36–46)
HCT VFR BLD EST: 24 % (ref 36–46)
HGB BLD-MCNC: 7.6 G/DL (ref 12–16)
HGB BLDA-MCNC: 8.1 G/DL (ref 12–16)
INHALED O2 CONCENTRATION: 36 %
INR PPP: 1.1 (ref 0.9–1.1)
LACTATE BLDA-SCNC: 0.8 MMOL/L (ref 0.4–2)
MCH RBC QN AUTO: 26.9 PG (ref 26–34)
MCHC RBC AUTO-ENTMCNC: 29 G/DL (ref 32–36)
MCV RBC AUTO: 93 FL (ref 80–100)
NRBC BLD-RTO: 4.9 /100 WBCS (ref 0–0)
OXYHGB MFR BLDA: 96 % (ref 94–98)
PCO2 BLDA: 92 MM HG (ref 38–42)
PH BLDA: 7.14 PH (ref 7.38–7.42)
PLATELET # BLD AUTO: 271 X10*3/UL (ref 150–450)
PO2 BLDA: 99 MM HG (ref 85–95)
POTASSIUM BLDA-SCNC: 3.6 MMOL/L (ref 3.5–5.3)
POTASSIUM SERPL-SCNC: 3.7 MMOL/L (ref 3.5–5.3)
PROT SERPL-MCNC: 5.5 G/DL (ref 6.4–8.2)
PROTHROMBIN TIME: 12.1 SECONDS (ref 9.8–12.8)
RBC # BLD AUTO: 2.83 X10*6/UL (ref 4–5.2)
SAO2 % BLDA: 100 % (ref 94–100)
SODIUM BLDA-SCNC: 133 MMOL/L (ref 136–145)
SODIUM SERPL-SCNC: 138 MMOL/L (ref 136–145)
VANCOMYCIN SERPL-MCNC: 15.1 UG/ML (ref 5–20)
WBC # BLD AUTO: 23.1 X10*3/UL (ref 4.4–11.3)

## 2023-11-22 PROCEDURE — 37799 UNLISTED PX VASCULAR SURGERY: CPT | Performed by: INTERNAL MEDICINE

## 2023-11-22 PROCEDURE — 1150000001 HC HOSPICE PRIVATE ROOM DAILY

## 2023-11-22 PROCEDURE — 99291 CRITICAL CARE FIRST HOUR: CPT | Performed by: ANESTHESIOLOGY

## 2023-11-22 PROCEDURE — 6350000001 HC RX 635 EPOETIN >10,000 UNITS: Mod: JZ | Performed by: INTERNAL MEDICINE

## 2023-11-22 PROCEDURE — 2500000004 HC RX 250 GENERAL PHARMACY W/ HCPCS (ALT 636 FOR OP/ED): Performed by: INTERNAL MEDICINE

## 2023-11-22 PROCEDURE — 82140 ASSAY OF AMMONIA: CPT | Performed by: INTERNAL MEDICINE

## 2023-11-22 PROCEDURE — 36600 WITHDRAWAL OF ARTERIAL BLOOD: CPT

## 2023-11-22 PROCEDURE — 85610 PROTHROMBIN TIME: CPT | Performed by: INTERNAL MEDICINE

## 2023-11-22 PROCEDURE — 85027 COMPLETE CBC AUTOMATED: CPT | Performed by: INTERNAL MEDICINE

## 2023-11-22 PROCEDURE — 80053 COMPREHEN METABOLIC PANEL: CPT | Performed by: INTERNAL MEDICINE

## 2023-11-22 PROCEDURE — 2500000001 HC RX 250 WO HCPCS SELF ADMINISTERED DRUGS (ALT 637 FOR MEDICARE OP): Performed by: INTERNAL MEDICINE

## 2023-11-22 PROCEDURE — 80202 ASSAY OF VANCOMYCIN: CPT | Performed by: ANESTHESIOLOGY

## 2023-11-22 PROCEDURE — 94660 CPAP INITIATION&MGMT: CPT

## 2023-11-22 PROCEDURE — 96372 THER/PROPH/DIAG INJ SC/IM: CPT | Performed by: INTERNAL MEDICINE

## 2023-11-22 PROCEDURE — 84132 ASSAY OF SERUM POTASSIUM: CPT | Performed by: INTERNAL MEDICINE

## 2023-11-22 PROCEDURE — 37799 UNLISTED PX VASCULAR SURGERY: CPT | Performed by: ANESTHESIOLOGY

## 2023-11-22 RX ORDER — MORPHINE SULFATE 2 MG/ML
2 INJECTION, SOLUTION INTRAMUSCULAR; INTRAVENOUS EVERY 2 HOUR PRN
Status: DISCONTINUED | OUTPATIENT
Start: 2023-11-22 | End: 2023-11-23 | Stop reason: HOSPADM

## 2023-11-22 RX ORDER — LORAZEPAM 2 MG/ML
0.5 INJECTION INTRAMUSCULAR EVERY 6 HOURS PRN
Status: DISCONTINUED | OUTPATIENT
Start: 2023-11-22 | End: 2023-11-23 | Stop reason: HOSPADM

## 2023-11-22 RX ADMIN — TRIAMCINOLONE ACETONIDE: 1 OINTMENT TOPICAL at 08:21

## 2023-11-22 RX ADMIN — Medication 10 DROP: at 08:22

## 2023-11-22 RX ADMIN — ZINC OXIDE 1 APPLICATION: 200 OINTMENT TOPICAL at 02:41

## 2023-11-22 RX ADMIN — LORAZEPAM 0.5 MG: 2 INJECTION INTRAMUSCULAR; INTRAVENOUS at 21:31

## 2023-11-22 RX ADMIN — NYSTATIN 1 APPLICATION: 100000 POWDER TOPICAL at 02:41

## 2023-11-22 RX ADMIN — MEROPENEM 500 MG: 500 INJECTION, POWDER, FOR SOLUTION INTRAVENOUS at 13:30

## 2023-11-22 RX ADMIN — ERYTHROPOIETIN 20000 UNITS: 10000 INJECTION, SOLUTION INTRAVENOUS; SUBCUTANEOUS at 11:59

## 2023-11-22 RX ADMIN — MORPHINE SULFATE 2 MG: 2 INJECTION, SOLUTION INTRAMUSCULAR; INTRAVENOUS at 21:28

## 2023-11-22 RX ADMIN — MORPHINE SULFATE 2 MG: 2 INJECTION, SOLUTION INTRAMUSCULAR; INTRAVENOUS at 23:36

## 2023-11-22 ASSESSMENT — PAIN SCALES - PAIN ASSESSMENT IN ADVANCED DEMENTIA (PAINAD)
CONSOLABILITY: NO NEED TO CONSOLE
BODYLANGUAGE: RELAXED
BREATHING: NORMAL
FACIALEXPRESSION: SMILING OR INEXPRESSIVE
TOTALSCORE: 0

## 2023-11-22 ASSESSMENT — COGNITIVE AND FUNCTIONAL STATUS - GENERAL
CLIMB 3 TO 5 STEPS WITH RAILING: TOTAL
MOVING TO AND FROM BED TO CHAIR: TOTAL
WALKING IN HOSPITAL ROOM: TOTAL
STANDING UP FROM CHAIR USING ARMS: TOTAL
TURNING FROM BACK TO SIDE WHILE IN FLAT BAD: TOTAL
MOVING FROM LYING ON BACK TO SITTING ON SIDE OF FLAT BED WITH BEDRAILS: TOTAL
MOBILITY SCORE: 6

## 2023-11-22 ASSESSMENT — PAIN - FUNCTIONAL ASSESSMENT
PAIN_FUNCTIONAL_ASSESSMENT: CPOT (CRITICAL CARE PAIN OBSERVATION TOOL)

## 2023-11-22 NOTE — PROGRESS NOTES
Nutrition Progress Note    Pt was transferred to ICU after being found unresponsive.  Both family and patient agree to pt being discharged to hospice.    Will sign off at this time.  Please re consult for any nutrition intervention needs.

## 2023-11-22 NOTE — NURSING NOTE
RN Hospice Note    Danielle Parisi is a Hospice Patient.   Hospice terminal diagnosis: Heart Failure  Physician: Thien Carcamo  Visit type: GIP admission    Comments/recommendations: Meeting held at the bedside with the patients sidney Reynoso ([facility]).  Goals of care reviewed based on her knowledge of pt wishes and discussions with the primary team.  She is reqeusting comfort measures only with hospice support.  Requesting patient remain comfortable and peacefull while the Bipap is removed.  She is aware patient prognosis could be imminent once the Bipap is removed.  Scope of hospice services provided to [place] including the short nature of GIP symptom management level of care.  Hospice consents signed    Discharge Planning:  Patient to be discharged to  Should pt symptoms become managed and stable for transfer plan will be to return to the [facility] [place]        Plan of care reviewed with patient/family members Sidney Reynoso Nestor   Plan of care reviewed with hospital staff members: Nkechi HENLEY, Julieta MARIE, Dr Carcamo     Please notify [facility] of any changes in condition. Thank you.  Office: [phone] (8 am-6:30 pm M-F and 8 am-4:30 pm weekends and holidays)   [phone] (6:30 pm-8 am M-F and 4:30 pm-8 am weekends and holidays)    Meliton Milian RN

## 2023-11-22 NOTE — CARE PLAN
The clinical goals for the shift include close monitoring of respiratory status and mental status. Patient maintained POX>92% while on 4L NC; small amount of sputum suctioned orally. Patient more easily aroused when compared to beginning of shift, although still unable to follow any commands and with nonsensical but clear speech.    Problem: Fall/Injury  Goal: Verbalize understanding of personal risk factors for fall in the hospital  Outcome: Not Progressing  Goal: Verbalize understanding of risk factor reduction measures to prevent injury from fall in the home  Outcome: Not Progressing  Goal: Use assistive devices by end of the shift  Outcome: Not Progressing  Goal: Pace activities to prevent fatigue by end of the shift  Outcome: Not Progressing     Problem: Heart Failure  Goal: Reduction in peripheral edema within 24 hours  Outcome: Not Progressing

## 2023-11-22 NOTE — DISCHARGE SUMMARY
Discharge Diagnosis  Acute decompensated heart failure (CMS/HCC), ESRD, Left sided pleural effusion and atelectasis.        Discharge Meds   Discharge to hospice       Hospital Course   Danielle Parisi is a 73 y.o. female with PMH of Hypertension, A-fib on Eliquis, CONOR, hypothyroidism, HFpEF,lymphedema, diabetes, hyperlipidemia, history of DVT,FRANC, OHA, CKD,Chronic UTIs with urinary retention with chronic vidal and  morbid obesity(bed bound), who was admitted to the hospital on 11/10 from nursing facility with SOB and volume overload 2/2 acute on chronic HFpEF(with predominant right-sided features ). Cardiology was consulted, gentle diuresis was started, but was held due to worsening S.creat. Cardizem was held for relative hypotension. Nephrology was consulted, patient's renal function continued to deteriorate, and she was started on dialysis on 11/18. Patient had 4 sessions of dialysis. Patient was more confused today following her dialysis session, with hypertension and more SOB with tachycardia. Patient was transferred to the ICU for further management.   In the ICU, the patient condition was still worsening, she was altered and placed on BiPAP for hypercapnia. CT chest showed marked left sided pleural effusion with marked lung atelectasis. The patient required bronchoscopy and thoracocentesis for atelectasis and pleural effusion. Her niece Angeles Baez (POA) arrived at the hospital and the team discussed with her the options of invasive management and she will be intubated, from previous discussion with her aunt she don't wish to be on a ventilator. She agreed on discharging her to inpatient hospice.    Pertinent Physical Exam At Time of Discharge  Physical Exam  Constitutional:       Appearance: She is obese. She is ill-appearing.   HENT:      Head: Normocephalic and atraumatic.      Nose: Nose normal.      Mouth/Throat:      Mouth: Mucous membranes are dry.      Pharynx: Oropharynx is clear.   Eyes:       Conjunctiva/sclera: Conjunctivae normal.      Pupils: Pupils are equal, round, and reactive to light.   Neck:      Comments: Dialysis line on the right side  Cardiovascular:      Rate and Rhythm: Normal rate. Rhythm irregular.      Pulses: Normal pulses.      Heart sounds: Normal heart sounds.   Pulmonary:      Effort: Pulmonary effort is normal.      Breath sounds: Normal breath sounds.      Comments: Decreased air entry on the left side  Abdominal:      General: Abdomen is flat. There is distension.      Tenderness: There is abdominal tenderness.   Musculoskeletal:      Cervical back: Normal range of motion and neck supple.      Right lower leg: Edema present.      Left lower leg: Edema present.   Skin:     General: Skin is warm.      Findings: Bruising and lesion present.   Neurological:      Mental Status: She is disoriented.         Outpatient Follow-Up  No future appointments.      Isaiah Oro, DO

## 2023-11-22 NOTE — PROGRESS NOTES
[facility] Progress Note    Consulted IR this AM for technically-challenging thoracentesis.  D/w Dr. Redd.  He had reviewed the patient's CT imaging and mentioned that an endobronchial obstruction (e.g., mucus plug) could also be contributing to the left lung atelectasis.    We then obtained an ABG which demonstrated acute respiratory acidosis.  Patient was placed on BiPAP.  We were planning on intubating her and performing a bronchoscopy but were unable to reach her POA (patient's niece, Emory).  Patient's cousin happened to be visiting and told the RN that Emory would not want her intubated.  Patient's cousin spoke with Emory who said she would come to the hospital.    I spoke with Emory at patient's bedside.  Explained the situation and and our plan going forward.  Emory said that her aunt has been declining for a while now and doesn't want to prolong her suffering.  I mentioned hospice and focusing on keeping her comfortable, and Emory agreed.      Mark Vences MD    Critical care time 60 min.

## 2023-11-22 NOTE — PROGRESS NOTES
Vancomycin Dosing by Pharmacy- Cessation of Therapy    Consult to pharmacy for vancomycin dosing has been discontinued by the prescriber, pharmacy will sign off at this time.    Please call pharmacy if there are further questions or re-enter a consult if vancomycin is resumed.     Daniel J Weiland, PharmD

## 2023-11-22 NOTE — DISCHARGE SUMMARY
Discharge Diagnosis  Acute decompensated heart failure (CMS/HCC)    Issues Requiring Follow-Up  Patient fully evaluated on November 22 and agreeable to hospice after discussion with niece    Discharge Meds     Your medication list        ASK your doctor about these medications        Instructions Last Dose Given Next Dose Due   acetaminophen 500 mg tablet  Commonly known as: Tylenol           Acidophilus capsule  Generic drug: lactobacillus acidophilus           apixaban 5 mg tablet  Commonly known as: Eliquis           bisacodyl 5 mg EC tablet  Commonly known as: Dulcolax           busPIRone 5 mg tablet  Commonly known as: Buspar           dilTIAZem  mg 24 hr capsule  Commonly known as: Cardizem CD           docusate sodium 250 mg capsule           gabapentin 100 mg capsule  Commonly known as: Neurontin           levothyroxine 50 mcg tablet  Commonly known as: Synthroid, Levoxyl           levothyroxine 50 mcg tablet  Commonly known as: Synthroid, Levoxyl           LORazepam 1 mg tablet  Commonly known as: Ativan           metoprolol tartrate 25 mg tablet  Commonly known as: Lopressor           nystatin 100,000 unit/gram powder  Commonly known as: Mycostatin           pantoprazole 40 mg EC tablet  Commonly known as: ProtoNix           potassium chloride CR 20 mEq ER tablet  Commonly known as: Klor-Con M20           predniSONE 20 mg tablet  Commonly known as: Deltasone           torsemide 20 mg tablet  Commonly known as: Demadex           traMADol 50 mg tablet  Commonly known as: Ultram                    Test Results Pending At Discharge  Pending Labs       Order Current Status    Extra Urine Gray Tube Collected (11/09/23 2247)    Urinalysis with Reflex Microscopic and Culture In process            Hospital Course         Thien Carcamo MD  Physician  Internal Medicine     Progress Notes      Signed     Date of Service: 11/21/2023 12:55 PM     Signed       Expand All Collapse All    Danielle Parisi is a 73 y.o.  female on day 11 of admission presenting with Acute decompensated heart failure (CMS/HCC).           Subjective   Patient fully evaluated on November 11 and clinically improving.              Objective   Last Recorded Vitals  /58   Pulse 94   Temp 36.3 °C (97.3 °F)   Resp 18   Wt (!) 168 kg (370 lb 6 oz)   SpO2 96%   Intake/Output last 3 Shifts:     Intake/Output Summary (Last 24 hours) at 11/21/2023 1255  Last data filed at 11/21/2023 0109      Gross per 24 hour   Intake 150 ml   Output --   Net 150 ml            Admission Weight  Weight: 150 kg (330 lb) (11/09/23 2215)     Daily Weight  11/17/23 : (!) 168 kg (370 lb 6 oz)     Image Results  XR chest 1 view  Narrative: Interpreted By:  Johann Wood,   STUDY:  XR CHEST 1 VIEW;  11/21/2023 7:13 am      INDICATION:  Signs/Symptoms:Fluid overload/atelectasis.      COMPARISON:  11/20/2023      ACCESSION NUMBER(S):  [id number]      ORDERING CLINICIAN:  LINH RONQUILLO      FINDINGS:  There is opacification left hemithorax with shift of the heart  mediastinum to the left indicating volume loss similar to the prior  exam. Right lower lung infiltrate fairly stable, but with additional  or increased right central infiltrate which may be due to congestion  and/or pneumonia.      ABDOMEN AND OTHER FINDINGS:  No remarkable upper abdominal findings.      BONES:  No acute osseous changes.      Impression: 1.  Increased right lung infiltrate.      Signed by: Johann Wood 11/21/2023 9:17 AM  Dictation workstation:   [id number]  XR chest 1 view  Narrative: Interpreted By:  Johann Wood,   STUDY:  XR CHEST 1 VIEW;  11/20/2023 6:05 pm      INDICATION:  Signs/Symptoms:sob.      COMPARISON:  11/18/2023      ACCESSION NUMBER(S):  [id number]      ORDERING CLINICIAN:  EMILY SHAFFER      FINDINGS:  No significant change from the previous examination. Again there is  opacification left hemithorax is volume loss with shift of the heart  mediastinum to the left. This is  probably due to lung consolidation  and collapse, probably with effusion. Infiltrate in the right lower  lung may be due to pneumonia or congestion.      ABDOMEN AND OTHER FINDINGS:  No remarkable upper abdominal findings.      BONES:  No acute osseous changes.      Impression: 1.  No significant change.      Signed by: Johann Wood 11/21/2023 9:16 AM  Dictation workstation:   [id number]        Physical Exam     Relevant Results                       Assessment/Plan   This patient currently has cardiac telemetry ordered; if you would like to modify or discontinue the telemetry order, click hereto go to the orders activity to modify/discontinue the order.                    Principal Problem:    Acute decompensated heart failure (CMS/HCC)           Thien Carcamo MD  Physician  Internal Medicine     H&P      Addendum     Date of Service: 11/10/2023  5:38 AM      Addendum        Expand All Collapse All    History Of Present Illness  Danielle Parisi is a 73 y.o. female presenting to emergency department from [facility] nursing facility for evaluation of increasing shortness of breath and volume overload.  Patient has a PICC line and is on IV Zosyn for recurrent UTI.  Over the past few days she has had a worsening shortness of breath.  She does admit to increasing lower extremity edema.  Patient is basically bedbound, denies fever but does admit to a cough.  Patient denies recent illness, nausea, vomiting, diarrhea.     In ED, , glucose 183, BUN 59, creatinine 1.46, GFR 38, AST 8, lactate 3.6 with a repeat pending.  WBCs 14.6, hemoglobin 8.3, hematocrit 29.7, troponin 17 with a repeat pending.  Chest x-ray completed showing moderate left pleural effusion with left basilar atelectasis, mild right basilar atelectasis per radiology review.  Last echocardiogram on file 2/27/2023 showing an ejection fraction of 60 to 65% with a dilated right atrium and severe tricuspid regurgitation.  EKG showing A-fib at a rate of 90  "without ST elevation or depression per ER physician review.  Patient given Lasix IV in ED.  Consult placed to cardiology.  Patient admitted to telemetry under the care of Dr. Carcamo who will continue to follow.  I was asked to do H&P and place initial admission orders.     Past Medical History  Hypertension, A-fib on Eliquis, CONOR, hypothyroidism, lymphedema, diabetes, hyperlipidemia, history of DVT, obesity     Surgical History  Patient denies     Social History  She has no history on file for tobacco use, alcohol use, and drug use.     Family History  Reviewed and noncontributory     Allergies  Penicillins and Sulfa (sulfonamide antibiotics)     Review of Systems  A 10 point review of systems was completed and negative except what is listed in HPI  Physical Exam  Constitutional:       Appearance: She is obese.   HENT:      Mouth/Throat:      Mouth: Mucous membranes are dry.      Pharynx: Oropharynx is clear.   Eyes:      Pupils: Pupils are equal, round, and reactive to light.   Cardiovascular:      Rate and Rhythm: Rhythm irregularly irregular.   Pulmonary:      Breath sounds: Rales present.   Abdominal:      General: Bowel sounds are normal.   Musculoskeletal:         General: Normal range of motion.      Cervical back: Normal range of motion.   Skin:     General: Skin is warm and dry.      Capillary Refill: Capillary refill takes less than 2 seconds.   Neurological:      General: No focal deficit present.      Mental Status: She is alert. Mental status is at baseline.   Psychiatric:         Mood and Affect: Mood normal.         Behavior: Behavior normal.            Last Recorded Vitals  Blood pressure 102/56, pulse 104, temperature 35.8 °C (96.4 °F), resp. rate 18, height 1.702 m (5' 7\"), weight (!) 168 kg (370 lb 6 oz), SpO2 97 %.     Relevant Results  XR chest 1 view     Result Date: 11/10/2023  STUDY: Chest Radiograph;  11/09/2023 10:56 PM INDICATION: Shortness of breath. COMPARISON: XR chest 03/16/2023, " 03/15/2023.  ACCESSION NUMBER(S): [id number] ORDERING CLINICIAN: SACHIN WELDON TECHNIQUE:  Frontal chest was obtained at 2229 hours. FINDINGS: CARDIOMEDIASTINAL SILHOUETTE: Cardiomediastinal silhouette is markedly enlarged and stable in size and configuration.  LUNGS: Left-sided PICC line is noted terminating in the region of the proximal SVC/left subclavian junction.  Mild right basilar atelectasis.  Moderate left pleural effusion is suggested with underlying left basilar atelectasis and/or consolidation.  ABDOMEN: No remarkable upper abdominal findings.  BONES: No acute osseous changes.     Moderate left pleural effusion with left basilar atelectasis and/or consolidation.  Mild right basilar atelectasis. Signed by Nakul Rucker MD                      Results for orders placed or performed during the hospital encounter of 11/09/23 (from the past 24 hour(s))   CBC and Auto Differential   Result Value Ref Range     WBC 14.6 (H) 4.4 - 11.3 x10*3/uL     nRBC 0.3 (H) 0.0 - 0.0 /100 WBCs     RBC 3.15 (L) 4.00 - 5.20 x10*6/uL     Hemoglobin 8.3 (L) 12.0 - 16.0 g/dL     Hematocrit 29.7 (L) 36.0 - 46.0 %     MCV 94 80 - 100 fL     MCH 26.3 26.0 - 34.0 pg     MCHC 27.9 (L) 32.0 - 36.0 g/dL     RDW 17.8 (H) 11.5 - 14.5 %     Platelets 306 150 - 450 x10*3/uL     Neutrophils % 92.8 40.0 - 80.0 %     Immature Granulocytes %, Automated 1.0 (H) 0.0 - 0.9 %     Lymphocytes % 2.6 13.0 - 44.0 %     Monocytes % 3.4 2.0 - 10.0 %     Eosinophils % 0.1 0.0 - 6.0 %     Basophils % 0.1 0.0 - 2.0 %     Neutrophils Absolute 13.55 (H) 1.60 - 5.50 x10*3/uL     Immature Granulocytes Absolute, Automated 0.15 0.00 - 0.50 x10*3/uL     Lymphocytes Absolute 0.38 (L) 0.80 - 3.00 x10*3/uL     Monocytes Absolute 0.50 0.05 - 0.80 x10*3/uL     Eosinophils Absolute 0.01 0.00 - 0.40 x10*3/uL     Basophils Absolute 0.02 0.00 - 0.10 x10*3/uL   Magnesium   Result Value Ref Range     Magnesium 1.67 1.60 - 2.40 mg/dL   Comprehensive metabolic panel    Result Value Ref Range     Glucose 183 (H) 74 - 99 mg/dL     Sodium 142 136 - 145 mmol/L     Potassium 4.0 3.5 - 5.3 mmol/L     Chloride 102 98 - 107 mmol/L     Bicarbonate 32 21 - 32 mmol/L     Anion Gap 12 10 - 20 mmol/L     Urea Nitrogen 59 (H) 6 - 23 mg/dL     Creatinine 1.46 (H) 0.50 - 1.05 mg/dL     eGFR 38 (L) >60 mL/min/1.73m*2     Calcium 8.5 (L) 8.6 - 10.3 mg/dL     Albumin 3.0 (L) 3.4 - 5.0 g/dL     Alkaline Phosphatase 89 33 - 136 U/L     Total Protein 5.6 (L) 6.4 - 8.2 g/dL     AST 8 (L) 9 - 39 U/L     Bilirubin, Total 0.3 0.0 - 1.2 mg/dL     ALT 25 7 - 45 U/L   Protime-INR   Result Value Ref Range     Protime 11.6 9.8 - 12.8 seconds     INR 1.0 0.9 - 1.1   aPTT   Result Value Ref Range     aPTT 30 27 - 38 seconds   Lactate   Result Value Ref Range     Lactate 3.6 (H) 0.4 - 2.0 mmol/L   Type And Screen   Result Value Ref Range     ABO TYPE O       Rh TYPE POS       ANTIBODY SCREEN NEG     Troponin I, High Sensitivity   Result Value Ref Range     Troponin I, High Sensitivity 17 (H) 0 - 13 ng/L   B-Type Natriuretic Peptide   Result Value Ref Range      (H) 0 - 99 pg/mL   Blood Gas Venous   Result Value Ref Range     POCT pH, Venous 7.30 (L) 7.33 - 7.43 pH     POCT pCO2, Venous 64 (H) 41 - 51 mm Hg     POCT pO2, Venous 36 35 - 45 mm Hg     POCT SO2, Venous 61 45 - 75 %     POCT Oxy Hemoglobin, Venous 59.0 45.0 - 75.0 %     POCT Base Excess, Venous 3.2 (H) -2.0 - 3.0 mmol/L     POCT HCO3 Calculated, Venous 31.5 (H) 22.0 - 26.0 mmol/L     Patient Temperature 37.0 degrees Celsius     FiO2 36 %     Test Comment ICU-S     Urinalysis with Reflex Microscopic and Culture   Result Value Ref Range     Color, Urine Yellow Straw, Yellow     Appearance, Urine Hazy (N) Clear     Specific Gravity, Urine 1.015 1.005 - 1.035     pH, Urine 5.0 5.0, 5.5, 6.0, 6.5, 7.0, 7.5, 8.0     Protein, Urine NEGATIVE NEGATIVE mg/dL     Glucose, Urine NEGATIVE NEGATIVE mg/dL     Blood, Urine MODERATE (2+) (A) NEGATIVE      Ketones, Urine NEGATIVE NEGATIVE mg/dL     Bilirubin, Urine NEGATIVE NEGATIVE     Urobilinogen, Urine <2.0 <2.0 mg/dL     Nitrite, Urine NEGATIVE NEGATIVE     Leukocyte Esterase, Urine LARGE (3+) (A) NEGATIVE   Microscopic Only, Urine   Result Value Ref Range     WBC, Urine >50 (A) 1-5, NONE /HPF     WBC Clumps, Urine FEW Reference range not established. /HPF     RBC, Urine >20 (A) NONE, 1-2, 3-5 /HPF     Squamous Epithelial Cells, Urine 1-9 (SPARSE) Reference range not established. /HPF     Transitional Epithelial Cells, Urine 1-2 (FEW) Reference range not established. /HPF     Bacteria, Urine 1+ (A) NONE SEEN /HPF     Budding Yeast, Urine PRESENT (A) NONE /HPF     Mucus, Urine 1+ Reference range not established. /LPF     Hyaline Casts, Urine 2+ (A) NONE /LPF            Assessment/Plan   Danielle is a 73-year-old female patient presenting to emergency department for evaluation of shortness of breath and volume overload.  Patient found to have a BNP of 116, elevated troponin, elevated lactate, elevated WBCs.  Imaging showing moderate left pleural effusion with left basilar atelectasis and mild right basilar atelectasis.  Last echocardiogram on file 2/27/2023 with an EF of 60 to 65%.  Patient having new oxygen requirements moving from 2 L/min to 4 L/min.  Patient currently receiving IV Zosyn via PICC line at skilled nursing facility for recurrent UTI.  Patient found to be UTI positive, no culture sent due to being on antibiotics chronically.  Patient given IV Lasix for volume overload in ED.  Consult placed to cardiology.  Admission for further medical management.     Acute decompensated heart failure  Admit to telemetry per Dr. Carcamo  Intake and output  See imaging results above  Daily weight  Lasix IV 40 mg in ED  Consult cardiology and appreciate input  Low-sodium diet  Fluid restriction  Chronic Cruz catheter     Diabetes/hypothyroidism/hypertension/A-fib/hyperlipidemia     Diabetic diet  ISS  Hypoglycemia  protocol  Hold home oral antidiabetic medications when med rec is complete  Continue home medications  Continue home Eliquis when med rec is complete     DVT PPx  Continue home Eliquis when med rec is complete  SCDs  Activity as tolerated     I spent 30 minutes in the professional and overall care of this patient.  Patient fully evaluated on November, plan as above.                        Revision History           Patient fully evaluated on November 11.  Debrox for bilateral ear cerumen impactions.  Continue to diurese patient and recheck labs in AM.        Patient fully evaluated on November 12.  Continue aggressive diuresis.  BUN and creatinine are stable.  Recheck labs in AM.     Patient fully evaluated on November 13.  Rise in BUN and creatinine noted and patient will transition to oral diuretics.  Recheck labs in AM.  Recheck chemistries in a.m. to check for renal function  Patient fully evaluated on November 14 and rise in BUN and creatinine noted.  Diuretics have been held as well as ARB's.  Nephrology consultation obtained.  Recheck labs in a.m.     Patient fully evaluated on November 15.  Continue to monitor BUN and creatinine diuretics been held.     Patient fully evaluated on November 16.  Continue to monitor H&H which drop was noted.  Eliquis has been held.  Continued acute kidney injury noted and appreciate nephrology consultation.  Recheck labs in AM.     Patient fully evaluated on November 17.  IV Lasix drip initiated for diuresis.  Patient has significant end-stage renal disease.     Patient fully evaluated on November 18 and consideration for dialysis.  Permacath to be placed today by interventional radiology.  Continue to monitor lab work and monitor for further fluid overload.  Patient has 2+ edema with significant anasarca.     Patient fully evaluated on November 19 and more awake and alert today.  Continue dialysis daily for now.  Pleural effusion noted which might be resolved with dialysis.   Continue to monitor for further fluid overload and recheck labs in AM.     Patient fully evaluated on November 20.  Still very lethargic.  Continue to monitor BUN and creatinine.  Repeat chest x-ray ordered.     Patient fully evaluated on November 21 and having hallucinations.  White count is still elevated with possible pneumonia.  Pulmonary consultation for pleural effusion versus consolidation.  IV vancomycin and Merrem initiated.  Recheck labs in AM.  Case discussed with nephrology.  Thien Carcamo MD                      Patient fully evaluated on November 22 and clinically unchanged.  No significant respiratory compromise noted and patient is DNR CC.  Case management to psych the patient to [facility] for GIP without discussion for myself.    Pertinent Physical Exam At Time of Discharge  Physical Exam    Outpatient Follow-Up  No future appointments.      Thien Carcamo MD

## 2023-11-22 NOTE — PROGRESS NOTES
Subjective   Patient developed tachycardia and dropping oxygen saturations while on the dialysis machine suggesting worsening respiratory acidosis.  Dialysis intervention was withheld and patient was evaluated for intensive care unit and transported there for further relation and treatment.  Pulmonary consult was obtained.  Patient has pneumonitis with complete opacification of the left hemithorax with atelectasis/pleural effusions,  Patient as well has a urinary tract infection.  Tested positive for MRSA in the nares.  Patient electrolytes and blood gas disclosed a triple acid-base disorder  Acute aspiratory acidosis with high anion gap metabolic acidosis and metabolic alkalosis.  BUN and creatinine are stable 36 and 2.19 respectively dialysis will be withheld today.  Reevaluated on a daily basis for possible renal replacement therapy.  Noted the patient liver functions are also elevated suggesting right-sided heart failure but as a precaution although potential hepatotoxins are going to be held  Patient currently is on vancomycin and Merrem.  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  Pupils react to light and accommodation  Neck; no hepatojugular reflux no jugular venous distention no bruit no thyromegaly no lymphadenopathy  Lungs she had crackles on inspiration left more than right with diminished air entry in the left side no wheezing  Heart; irregular rate there is no gallop no rubs  Abdomen; active peristalsis no rebound or guarding abdomen is obese no fluid wave  Extremities; severe lymphedema progressing (anasarca with ulceration heal in the right leg;  There is erythema and tenderness in the lateral aspect of the left leg suggesting cellulitis  Pulses are decreased because of the edema of the legs    Last Recorded Vitals  Blood pressure 102/56,  "pulse 89, temperature 35.6 °C (96.1 °F), temperature source Skin, resp. rate 18, height 1.702 m (5' 7.01\"), weight (!) 171 kg (377 lb 13.9 oz), SpO2 92 %.  Intake/Output last 3 Shifts:  I/O last 3 completed shifts:  In: 750 (4.4 mL/kg) [I.V.:600 (3.5 mL/kg); IV Piggyback:150]  Out: 75 (0.4 mL/kg) [Urine:75 (0 mL/kg/hr)]  Weight: 171.4 kg     Relevant Results  Results for orders placed or performed during the hospital encounter of 11/09/23 (from the past 24 hour(s))   POCT GLUCOSE   Result Value Ref Range    POCT Glucose 171 (H) 74 - 99 mg/dL   POCT GLUCOSE   Result Value Ref Range    POCT Glucose 128 (H) 74 - 99 mg/dL   Blood Gas Arterial Full Panel   Result Value Ref Range    POCT pH, Arterial 7.35 (L) 7.38 - 7.42 pH    POCT pCO2, Arterial 50 (H) 38 - 42 mm Hg    POCT pO2, Arterial 70 (L) 85 - 95 mm Hg    POCT SO2, Arterial 96 94 - 100 %    POCT Oxy Hemoglobin, Arterial 92.3 (L) 94.0 - 98.0 %    POCT Hematocrit Calculated, Arterial 24.0 (L) 36.0 - 46.0 %    POCT Sodium, Arterial 134 (L) 136 - 145 mmol/L    POCT Potassium, Arterial 3.9 3.5 - 5.3 mmol/L    POCT Chloride, Arterial 100 98 - 107 mmol/L    POCT Ionized Calcium, Arterial 1.07 (L) 1.10 - 1.33 mmol/L    POCT Glucose, Arterial 132 (H) 74 - 99 mg/dL    POCT Lactate, Arterial 2.2 (H) 0.4 - 2.0 mmol/L    POCT Base Excess, Arterial 1.6 -2.0 - 3.0 mmol/L    POCT HCO3 Calculated, Arterial 27.6 (H) 22.0 - 26.0 mmol/L    POCT Hemoglobin, Arterial 8.0 (L) 12.0 - 16.0 g/dL    POCT Anion Gap, Arterial 10 10 - 25 mmo/L    Patient Temperature 37.0 degrees Celsius    FiO2 40 %   Lactate   Result Value Ref Range    Lactate 1.4 0.4 - 2.0 mmol/L   CBC   Result Value Ref Range    WBC 24.8 (H) 4.4 - 11.3 x10*3/uL    nRBC 4.5 (H) 0.0 - 0.0 /100 WBCs    RBC 2.82 (L) 4.00 - 5.20 x10*6/uL    Hemoglobin 7.5 (L) 12.0 - 16.0 g/dL    Hematocrit 25.9 (L) 36.0 - 46.0 %    MCV 92 80 - 100 fL    MCH 26.6 26.0 - 34.0 pg    MCHC 29.0 (L) 32.0 - 36.0 g/dL    RDW 19.5 (H) 11.5 - 14.5 %    " Platelets 160 150 - 450 x10*3/uL   Comprehensive metabolic panel   Result Value Ref Range    Glucose 189 (H) 74 - 99 mg/dL    Sodium 136 136 - 145 mmol/L    Potassium 3.9 3.5 - 5.3 mmol/L    Chloride 96 (L) 98 - 107 mmol/L    Bicarbonate 27 21 - 32 mmol/L    Anion Gap 17 10 - 20 mmol/L    Urea Nitrogen 31 (H) 6 - 23 mg/dL    Creatinine 1.99 (H) 0.50 - 1.05 mg/dL    eGFR 26 (L) >60 mL/min/1.73m*2    Calcium 8.3 (L) 8.6 - 10.3 mg/dL    Albumin 3.9 3.4 - 5.0 g/dL    Alkaline Phosphatase 170 (H) 33 - 136 U/L    Total Protein 5.6 (L) 6.4 - 8.2 g/dL     (H) 9 - 39 U/L    Bilirubin, Total 1.3 (H) 0.0 - 1.2 mg/dL     (H) 7 - 45 U/L   MRSA Surveillance for Vancomycin De-escalation, PCR    Specimen: Anterior Nares; Swab   Result Value Ref Range    MRSA PCR Detected (A) Not Detected   Comprehensive metabolic panel   Result Value Ref Range    Glucose 149 (H) 74 - 99 mg/dL    Sodium 138 136 - 145 mmol/L    Potassium 3.7 3.5 - 5.3 mmol/L    Chloride 96 (L) 98 - 107 mmol/L    Bicarbonate 28 21 - 32 mmol/L    Anion Gap 18 10 - 20 mmol/L    Urea Nitrogen 36 (H) 6 - 23 mg/dL    Creatinine 2.19 (H) 0.50 - 1.05 mg/dL    eGFR 23 (L) >60 mL/min/1.73m*2    Calcium 8.5 (L) 8.6 - 10.3 mg/dL    Albumin 3.7 3.4 - 5.0 g/dL    Alkaline Phosphatase 195 (H) 33 - 136 U/L    Total Protein 5.5 (L) 6.4 - 8.2 g/dL     (H) 9 - 39 U/L    Bilirubin, Total 1.2 0.0 - 1.2 mg/dL     (H) 7 - 45 U/L   CBC   Result Value Ref Range    WBC 23.1 (H) 4.4 - 11.3 x10*3/uL    nRBC 4.9 (H) 0.0 - 0.0 /100 WBCs    RBC 2.83 (L) 4.00 - 5.20 x10*6/uL    Hemoglobin 7.6 (L) 12.0 - 16.0 g/dL    Hematocrit 26.2 (L) 36.0 - 46.0 %    MCV 93 80 - 100 fL    MCH 26.9 26.0 - 34.0 pg    MCHC 29.0 (L) 32.0 - 36.0 g/dL    RDW 19.5 (H) 11.5 - 14.5 %    Platelets 271 150 - 450 x10*3/uL   Protime-INR   Result Value Ref Range    Protime 12.1 9.8 - 12.8 seconds    INR 1.1 0.9 - 1.1   Ammonia   Result Value Ref Range    Ammonia 53 16 - 53 umol/L   Blood Gas  Arterial Full Panel   Result Value Ref Range    POCT pH, Arterial 7.14 (LL) 7.38 - 7.42 pH    POCT pCO2, Arterial 92 (HH) 38 - 42 mm Hg    POCT pO2, Arterial 99 (H) 85 - 95 mm Hg    POCT SO2, Arterial 100 94 - 100 %    POCT Oxy Hemoglobin, Arterial 96.0 94.0 - 98.0 %    POCT Hematocrit Calculated, Arterial 24.0 (L) 36.0 - 46.0 %    POCT Sodium, Arterial 133 (L) 136 - 145 mmol/L    POCT Potassium, Arterial 3.6 3.5 - 5.3 mmol/L    POCT Chloride, Arterial 98 98 - 107 mmol/L    POCT Ionized Calcium, Arterial 1.23 1.10 - 1.33 mmol/L    POCT Glucose, Arterial 157 (H) 74 - 99 mg/dL    POCT Lactate, Arterial 0.8 0.4 - 2.0 mmol/L    POCT Base Excess, Arterial 1.3 -2.0 - 3.0 mmol/L    POCT HCO3 Calculated, Arterial 31.3 (H) 22.0 - 26.0 mmol/L    POCT Hemoglobin, Arterial 8.1 (L) 12.0 - 16.0 g/dL    POCT Anion Gap, Arterial 7 (L) 10 - 25 mmo/L    Patient Temperature 37.0 degrees Celsius    FiO2 36 %    Apparatus CANNULA     Critical Called By Concetta Collins RRT     Critical Called To      Critical Call Time 1015.0000     Critical Read Back Y      CT chest wo IV contrast    Result Date: 11/22/2023  Interpreted By:  Severo Mccoy, STUDY: CT CHEST WO IV CONTRAST;  11/21/2023 9:08 pm   INDICATION: Signs/Symptoms:White out, left hemithorax..   COMPARISON: None.   ACCESSION NUMBER(S): [id number]   ORDERING CLINICIAN: SACHIN JACOB   TECHNIQUE: Contiguous axial images of the chest were obtained without intravenous contrast. Coronal and sagittal reformatted images were obtained from the axial images.   FINDINGS: The examination is limited secondary to patient body habitus, motion, beam hardening artifact secondary to inferior position of the patient's arms, and the lack of intravenous contrast.   No axillary or mediastinal lymphadenopathy. There is limited evaluation for hilar lymphadenopathy on noncontrast examination.   There is cardiomegaly. Coronary artery atherosclerotic calcifications. No significant pericardial effusion.  "  There is complete opacification of the lung correspond to combination of large left pleural effusion and compressive atelectasis of the left lung. Other underlying pathology including obstructive mass is not excluded. There is also right pleural effusion and consolidative opacity in the right lower lobe and also mild groundglass opacity in the right upper lobe.   Limited evaluation of the upper abdomen.   Multilevel degenerative change of the thoracic spine.       Complete opacification of the left thorax corresponding to combination of large left pleural effusion and compressive atelectasis of the left lung. Other underlying pathology, including obscured lung mass is not excluded, and short-term progress imaging is recommended for further evaluation. Right pleural effusion and atelectatic/consolidative opacity in the right lower lobe. Mild groundglass opacity in the right upper lobe may correspond to atelectasis, edema or infectious process.   MACRO: None   Signed by: Severo Mccoy 11/22/2023 2:54 AM Dictation workstation:   [id number]    IR CVC nontunneled    Result Date: 11/21/2023  Interpreted By:  Christy Redd, STUDY: IR CVC NONTUNNELED;  11/18/2023 1:55 pm   INDICATION: Signs/Symptoms:Renal failure.   COMPARISON: None.   ACCESSION NUMBER(S): [id number]   ORDERING CLINICIAN: LINH RONQUILLO   TECHNIQUE: INTERVENTIONALIST(S): Christy Redd MD   The history and physical exam pertinent to the procedure were reviewed and no updates were made.\"   CONSENT: The patient/patient's POA/next of kin was informed of the nature of the proposed procedure. The purposes, alternatives, risks, and benefits were explained and discussed. All questions were answered and consent was obtained.   RADIATION EXPOSURE: None   SEDATION: None   MEDICATION/CONTRAST: No additional.   TIME OUT: A time out was performed immediately prior to procedure start with the interventional team, correctly identifying the patient name, date of " birth, MRN, procedure, anatomy (including marking of site and side), patient position, procedure consent form, relevant laboratory and imaging test results, antibiotic administration, safety precautions, and procedure-specific equipment needs.   COMPLICATIONS: No immediate adverse events identified.   FINDINGS: Maximum sterile barrier technique was implemented. Due to patient's size and limited tolerance for the procedure, this was performed using ultrasound guidance only in the patient bed. Follow up chest x-rays performed to confirm appropriate placement of the catheter.. The  right supraclavicular and infraclavicular cutaneous tissues were prepared and draped in usual sterile manner. 1% Lidocaine was instilled into the subcutaneous soft tissues at the access site and tunnel tract for local anesthesia. Ultrasound images demonstrate a patent  right internal jugular vein. Under direct ultrasound guidance, a micropuncture needle was used to access the  right internal jugular vein utilizing Seldinger and micropuncture technique. An ultrasound digital spot image was acquired to confirm location and stored on the  PACS.   After confirmation of location, a 018 [place] guidewire was introduced through the access needle to secure and confirm location. There was subsequent exchange and placement over the wire for a coaxial 5-Samoan dilator sheath system. The inner dilator and wire were removed with the sheath left in place. A 035  [place] guidewire was introduced to the sheath with access into the inferior vena cava. This was followed by serial soft tissue dilatation using 10 and 12 Samoan vascular dilators followed by placement of a power Trialysis catheter, 20 cm. This was secured using non resorbable suture and adhesive dressing.   The catheter ports were aspirated and flushed without resistance with normal saline. The dialysis catheter ports were then charged with high-concentration heparin. The infusion port  was flushed using heparinized saline. The patient tolerated the procedure without complication.       1. Technically crash that technically successful placement non tunneled 20 cm tri fusion catheter, right IJ, using ultrasound guidance   I was present for and/or performed the critical portions of the procedure and immediately available throughout the entire procedure.   I personally reviewed the image(s) / study and resident interpretation. I agree with the findings as stated.   Performed and dictated at [facility].   MACRO: None   Signed by: Christy Redd 11/21/2023 2:01 PM Dictation workstation:   [id number]    XR chest 1 view    Result Date: 11/21/2023  Interpreted By:  Johann Wood, STUDY: XR CHEST 1 VIEW;  11/21/2023 7:13 am   INDICATION: Signs/Symptoms:Fluid overload/atelectasis.   COMPARISON: 11/20/2023   ACCESSION NUMBER(S): [id number]   ORDERING CLINICIAN: LINH RONQUILLO   FINDINGS: There is opacification left hemithorax with shift of the heart mediastinum to the left indicating volume loss similar to the prior exam. Right lower lung infiltrate fairly stable, but with additional or increased right central infiltrate which may be due to congestion and/or pneumonia.   ABDOMEN AND OTHER FINDINGS: No remarkable upper abdominal findings.   BONES: No acute osseous changes.       1.  Increased right lung infiltrate.   Signed by: Johann Wood 11/21/2023 9:17 AM Dictation workstation:   [id number]      US renal complete    Result Date: 11/15/2023  Interpreted By:  Johann Wood, STUDY: US RENAL COMPLETE;  11/14/2023 8:42 pm   INDICATION: Signs/Symptoms:Renal failure.   COMPARISON: None.   ACCESSION NUMBER(S): [id number]   ORDERING CLINICIAN: LINH RONQUILLO   TECHNIQUE: Multiple images of the kidneys were obtained  , with color Doppler for blood flow.  It is noted the exam was limited due to patient habitus , limited mobility and continuous respiration.   FINDINGS: RIGHT  KIDNEY: The right kidney measures 9.6 cm in length.  There is mild thinning of the renal cortex at the lower pole indicating mild atrophy. The remaining cortex appears maintained in homogeneous..  No hydronephrosis or evidence of nephrolithiasis. Color Doppler demonstrates renal blood flow.   LEFT KIDNEY: The left kidney could not be visualized, presumably secondary to limiting factors as above.   BLADDER: Not visualized, also probably due to limiting factors and under distension.   OTHER FINDINGS: None significant.       Mild right renal cortical atrophy at the lower pole, otherwise no hydronephrosis. The left kidney and bladder were not visualized.   Signed by: Johann Wood 11/15/2023 8:39 AM Dictation workstation:   [id number]                                Assessment/Plan   #1 acute kidney injury superimposed on chronic kidney disease  #2  Mixed acid-base disorder  #3 acute hyperkalemia  #4 GI bleed  #5 anasarca  #6 secondary hyperparathyroidism of renal origin with hyperphosphatemia ( PTH was 329)  #7 acute rhabdomyolysis  #8 pyocystitis  #9 atrial fibrillation  #10 hyperuricemia  #11 nonnephrotic range proteinuria  #12 cellulitis of left leg  #13 uremic encephalopathy  #14 uncontrolled diabetes  #15 autonomic neuropathic hypotension   Plan  Continue respiratory support  Monitor renal chemistries  We will evaluate patient daily to ascertain need for dialysis    Thank you very much for allowing to participate in the care of this patient  Troy Patricio MD

## 2023-11-22 NOTE — PROGRESS NOTES
This  was alerted to hospice need by floor . SW talked with resident and then met with sidnye/MACO Mijares at the bedside. Sidney is agreeable to hospice and would like [facility] so GIP could be utilized. CYNTHIA made referral to [facility] and notified Reza from [facility]. Updated RN.  will continue to follow. Message with questions.  SANDRA Prater

## 2023-11-23 VITALS
DIASTOLIC BLOOD PRESSURE: 77 MMHG | TEMPERATURE: 96.4 F | RESPIRATION RATE: 55 BRPM | OXYGEN SATURATION: 84 % | SYSTOLIC BLOOD PRESSURE: 99 MMHG | HEART RATE: 103 BPM

## 2023-11-23 PROBLEM — N18.6 ESRD (END STAGE RENAL DISEASE) (MULTI): Status: ACTIVE | Noted: 2023-11-23

## 2023-11-23 PROCEDURE — 2500000004 HC RX 250 GENERAL PHARMACY W/ HCPCS (ALT 636 FOR OP/ED): Performed by: INTERNAL MEDICINE

## 2023-11-23 RX ORDER — GLYCOPYRROLATE 0.2 MG/ML
0.2 INJECTION INTRAMUSCULAR; INTRAVENOUS EVERY 4 HOURS PRN
Status: DISCONTINUED | OUTPATIENT
Start: 2023-11-23 | End: 2023-11-23 | Stop reason: HOSPADM

## 2023-11-23 RX ADMIN — LORAZEPAM 0.5 MG: 2 INJECTION INTRAMUSCULAR; INTRAVENOUS at 03:52

## 2023-11-23 RX ADMIN — MORPHINE SULFATE 2 MG: 2 INJECTION, SOLUTION INTRAMUSCULAR; INTRAVENOUS at 01:39

## 2023-11-23 RX ADMIN — MORPHINE SULFATE 2 MG: 2 INJECTION, SOLUTION INTRAMUSCULAR; INTRAVENOUS at 03:51

## 2023-11-23 NOTE — SIGNIFICANT EVENT
I was called to the bedside to pronounce that patient in room Ms. Parisi had .  No spontaneous movements were present.  There was no response to verbal or tactile stimuli.  Pupils were dilated and fixed.  Corneal reflex absent.  No breath sounds were auscultated.  No carotid pulse was palpable.  No heart sounds were auscultated over entire precordium. Patient pronounced at 1400 on 2023 . Family and Nurse notified.

## 2023-11-23 NOTE — H&P
History Of Present Illness  Danielle Parisi is a 73 y.o. female presenting to emergency department from skilled nursing facility for evaluation of increasing shortness of breath and volume overload.  Patient has a PICC line and is on IV Zosyn for recurrent UTI.  Over the past few days she has had a worsening shortness of breath.  She does admit to increasing lower extremity edema.  Patient is basically bedbound, denies fever but does admit to a cough.  Patient denies recent illness, nausea, vomiting, diarrhea.    In ED, , glucose 183, BUN 59, creatinine 1.46, GFR 38, AST 8, lactate 3.6 with a repeat pending.  WBCs 14.6, hemoglobin 8.3, hematocrit 29.7, troponin 17 with a repeat pending.  Chest x-ray completed showing moderate left pleural effusion with left basilar atelectasis, mild right basilar atelectasis per radiology review.  Last echocardiogram on file 2/27/2023 showing an ejection fraction of 60 to 65% with a dilated right atrium and severe tricuspid regurgitation.  EKG showing A-fib at a rate of 90 without ST elevation or depression per ER physician review.  Patient given Lasix IV in ED.  Consult placed to cardiology.  Patient admitted to telemetry under the care of Dr. Carcamo who will continue to follow.  I was asked to do H&P and place initial admission orders.     Past Medical History  Hypertension, A-fib on Eliquis, CONOR, hypothyroidism, lymphedema, diabetes, hyperlipidemia, history of DVT, obesity    Surgical History  Patient denies     Social History  She reports that she has never smoked. She has never used smokeless tobacco. She reports that she does not drink alcohol and does not use drugs.    Family History  Reviewed and noncontributory     Allergies  Penicillins and Sulfa (sulfonamide antibiotics)    Review of Systems  A 10 point review of systems was completed and negative except what is listed in HPI  Physical Exam  Constitutional:       Appearance: She is obese.   HENT:      Mouth/Throat:       Mouth: Mucous membranes are dry.      Pharynx: Oropharynx is clear.   Eyes:      Pupils: Pupils are equal, round, and reactive to light.   Cardiovascular:      Rate and Rhythm: Rhythm irregularly irregular.   Pulmonary:      Breath sounds: Rales present.   Abdominal:      General: Bowel sounds are normal.   Musculoskeletal:         General: Normal range of motion.      Cervical back: Normal range of motion.   Skin:     General: Skin is warm and dry.      Capillary Refill: Capillary refill takes less than 2 seconds.   Neurological:      General: No focal deficit present.      Mental Status: She is alert. Mental status is at baseline.   Psychiatric:         Mood and Affect: Mood normal.         Behavior: Behavior normal.          Last Recorded Vitals  Blood pressure 99/77, pulse 103, temperature 35.8 °C (96.4 °F), temperature source Temporal, resp. rate (!) 55, SpO2 (!) 84 %.    Relevant Results  XR chest 1 view    Result Date: 11/10/2023  STUDY: Chest Radiograph;  11/09/2023 10:56 PM INDICATION: Shortness of breath. COMPARISON: XR chest 03/16/2023, 03/15/2023.  ACCESSION NUMBER(S): [id number] ORDERING CLINICIAN: SACHIN WELDON TECHNIQUE:  Frontal chest was obtained at 2229 hours. FINDINGS: CARDIOMEDIASTINAL SILHOUETTE: Cardiomediastinal silhouette is markedly enlarged and stable in size and configuration.  LUNGS: Left-sided PICC line is noted terminating in the region of the proximal SVC/left subclavian junction.  Mild right basilar atelectasis.  Moderate left pleural effusion is suggested with underlying left basilar atelectasis and/or consolidation.  ABDOMEN: No remarkable upper abdominal findings.  BONES: No acute osseous changes.    Moderate left pleural effusion with left basilar atelectasis and/or consolidation.  Mild right basilar atelectasis. Signed by Nakul Rucker MD         Results for orders placed or performed during the hospital encounter of 11/09/23 (from the past 24 hour(s))   Vancomycin    Result Value Ref Range    Vancomycin 15.1 5.0 - 20.0 ug/mL       Assessment/Plan   Danielle is a 73-year-old female patient presenting to emergency department for evaluation of shortness of breath and volume overload.  Patient found to have a BNP of 116, elevated troponin, elevated lactate, elevated WBCs.  Imaging showing moderate left pleural effusion with left basilar atelectasis and mild right basilar atelectasis.  Last echocardiogram on file 2/27/2023 with an EF of 60 to 65%.  Patient having new oxygen requirements moving from 2 L/min to 4 L/min.  Patient currently receiving IV Zosyn via PICC line at [facility] for recurrent UTI.  Patient found to be UTI positive, no culture sent due to being on antibiotics chronically.  Patient given IV Lasix for volume overload in ED.  Consult placed to cardiology.  Admission for further medical management.    Acute decompensated heart failure  Admit to telemetry per Dr. Carcamo  Intake and output  See imaging results above  Daily weight  Lasix IV 40 mg in ED  Consult cardiology and appreciate input  Low-sodium diet  Fluid restriction  Chronic Cruz catheter    Diabetes/hypothyroidism/hypertension/A-fib/hyperlipidemia   Diabetic diet  ISS  Hypoglycemia protocol  Hold home oral antidiabetic medications when med rec is complete  Continue home medications  Continue home Eliquis when med rec is complete    DVT PPx  Continue home Eliquis when med rec is complete  SCDs  Activity as tolerated  Patient evaluated in ICU and now hospice GIP.  Comfort measures in place.  Patient with acute upon chronic respiratory failure with end-stage renal disease.    Thien Carcamo MD

## 2023-12-04 NOTE — NURSING NOTE
CHF Clinical Nurse Navigator Documentation    Congestive Heart Failure disease education was performed by the Clinical Nurse Navigator with a good understanding.  CHF signs and symptoms discussed and when to call cardiologist?  Yes  Living With Heart Failure Education booklet?  Yes  Controlling Heart Failure at Home Education? Yes  CHF Education Teaching Tool? Yes  Home medication usage?  Yes  Nutrition Education? Yes  Fluid Restriction Education? Yes  Daily Weight Education? Yes  Cardiovascular Rehab Referral ordered?  Yes  Follow-up with Cardiologist after discharge education? Yes  Comments: Patient very [place] at this time.    PAST SURGICAL HISTORY:  No significant past surgical history

## 2023-12-17 NOTE — DISCHARGE SUMMARY
Discharge Diagnosis  ESRD (end stage renal disease) (CMS/HCC)    Issues Requiring Follow-Up          Thien Carcamo MD  Physician  Internal Medicine     Progress Notes      Signed     Date of Service: 11/21/2023 12:55 PM   Related encounter: ED to Hosp-Admission (Discharged) from 11/9/2023 in [facility] Intensive Care with Thien Carcamo MD and Mark Carrillo MD     Signed       Expand All Collapse All    Danielle Parisi is a 73 y.o. female on day 11 of admission presenting with Acute decompensated heart failure (CMS/HCC).           Subjective   Patient fully evaluated on November 11 and clinically improving.              Objective   Last Recorded Vitals  /58   Pulse 94   Temp 36.3 °C (97.3 °F)   Resp 18   Wt (!) 168 kg (370 lb 6 oz)   SpO2 96%   Intake/Output last 3 Shifts:     Intake/Output Summary (Last 24 hours) at 11/21/2023 1255  Last data filed at 11/21/2023 0109      Gross per 24 hour   Intake 150 ml   Output --   Net 150 ml            Admission Weight  Weight: 150 kg (330 lb) (11/09/23 2215)     Daily Weight  11/17/23 : (!) 168 kg (370 lb 6 oz)     Image Results  XR chest 1 view  Narrative: Interpreted By:  Johann Wood,   STUDY:  XR CHEST 1 VIEW;  11/21/2023 7:13 am      INDICATION:  Signs/Symptoms:Fluid overload/atelectasis.      COMPARISON:  11/20/2023      ACCESSION NUMBER(S):  [id number]      ORDERING CLINICIAN:  LINH RONQUILLO      FINDINGS:  There is opacification left hemithorax with shift of the heart  mediastinum to the left indicating volume loss similar to the prior  exam. Right lower lung infiltrate fairly stable, but with additional  or increased right central infiltrate which may be due to congestion  and/or pneumonia.      ABDOMEN AND OTHER FINDINGS:  No remarkable upper abdominal findings.      BONES:  No acute osseous changes.      Impression: 1.  Increased right lung infiltrate.      Signed by: Johann Wood 11/21/2023 9:17 AM  Dictation workstation:    [id number]  XR chest 1 view  Narrative: Interpreted By:  Johann Wood,   STUDY:  XR CHEST 1 VIEW;  11/20/2023 6:05 pm      INDICATION:  Signs/Symptoms:sob.      COMPARISON:  11/18/2023      ACCESSION NUMBER(S):  [id number]      ORDERING CLINICIAN:  THIEN SHAFFER      FINDINGS:  No significant change from the previous examination. Again there is  opacification left hemithorax is volume loss with shift of the heart  mediastinum to the left. This is probably due to lung consolidation  and collapse, probably with effusion. Infiltrate in the right lower  lung may be due to pneumonia or congestion.      ABDOMEN AND OTHER FINDINGS:  No remarkable upper abdominal findings.      BONES:  No acute osseous changes.      Impression: 1.  No significant change.      Signed by: Johann Wood 11/21/2023 9:16 AM  Dictation workstation:   [id number]        Physical Exam     Relevant Results                       Assessment/Plan   This patient currently has cardiac telemetry ordered; if you would like to modify or discontinue the telemetry order, click hereto go to the orders activity to modify/discontinue the order.                    Principal Problem:    Acute decompensated heart failure (CMS/HCC)           Thien Shaffer MD  Physician  Internal Medicine     H&P      Addendum     Date of Service: 11/10/2023  5:38 AM      Addendum        Expand All Collapse All    History Of Present Illness  Danielle Parisi is a 73 y.o. female presenting to emergency department from skilled nursing facility for evaluation of increasing shortness of breath and volume overload.  Patient has a PICC line and is on IV Zosyn for recurrent UTI.  Over the past few days she has had a worsening shortness of breath.  She does admit to increasing lower extremity edema.  Patient is basically bedbound, denies fever but does admit to a cough.  Patient denies recent illness, nausea, vomiting, diarrhea.     In ED, , glucose 183, BUN 59, creatinine 1.46, GFR  38, AST 8, lactate 3.6 with a repeat pending.  WBCs 14.6, hemoglobin 8.3, hematocrit 29.7, troponin 17 with a repeat pending.  Chest x-ray completed showing moderate left pleural effusion with left basilar atelectasis, mild right basilar atelectasis per radiology review.  Last echocardiogram on file 2/27/2023 showing an ejection fraction of 60 to 65% with a dilated right atrium and severe tricuspid regurgitation.  EKG showing A-fib at a rate of 90 without ST elevation or depression per ER physician review.  Patient given Lasix IV in ED.  Consult placed to cardiology.  Patient admitted to telemetry under the care of Dr. Carcamo who will continue to follow.  I was asked to do H&P and place initial admission orders.     Past Medical History  Hypertension, A-fib on Eliquis, CONOR, hypothyroidism, lymphedema, diabetes, hyperlipidemia, history of DVT, obesity     Surgical History  Patient denies     Social History  She has no history on file for tobacco use, alcohol use, and drug use.     Family History  Reviewed and noncontributory     Allergies  Penicillins and Sulfa (sulfonamide antibiotics)     Review of Systems  A 10 point review of systems was completed and negative except what is listed in HPI  Physical Exam  Constitutional:       Appearance: She is obese.   HENT:      Mouth/Throat:      Mouth: Mucous membranes are dry.      Pharynx: Oropharynx is clear.   Eyes:      Pupils: Pupils are equal, round, and reactive to light.   Cardiovascular:      Rate and Rhythm: Rhythm irregularly irregular.   Pulmonary:      Breath sounds: Rales present.   Abdominal:      General: Bowel sounds are normal.   Musculoskeletal:         General: Normal range of motion.      Cervical back: Normal range of motion.   Skin:     General: Skin is warm and dry.      Capillary Refill: Capillary refill takes less than 2 seconds.   Neurological:      General: No focal deficit present.      Mental Status: She is alert. Mental status is at baseline.  "  Psychiatric:         Mood and Affect: Mood normal.         Behavior: Behavior normal.            Last Recorded Vitals  Blood pressure 102/56, pulse 104, temperature 35.8 °C (96.4 °F), resp. rate 18, height 1.702 m (5' 7\"), weight (!) 168 kg (370 lb 6 oz), SpO2 97 %.     Relevant Results  XR chest 1 view     Result Date: 11/10/2023  STUDY: Chest Radiograph;  11/09/2023 10:56 PM INDICATION: Shortness of breath. COMPARISON: XR chest 03/16/2023, 03/15/2023.  ACCESSION NUMBER(S): [id number] ORDERING CLINICIAN: SACHIN WELDON TECHNIQUE:  Frontal chest was obtained at 2229 hours. FINDINGS: CARDIOMEDIASTINAL SILHOUETTE: Cardiomediastinal silhouette is markedly enlarged and stable in size and configuration.  LUNGS: Left-sided PICC line is noted terminating in the region of the proximal SVC/left subclavian junction.  Mild right basilar atelectasis.  Moderate left pleural effusion is suggested with underlying left basilar atelectasis and/or consolidation.  ABDOMEN: No remarkable upper abdominal findings.  BONES: No acute osseous changes.     Moderate left pleural effusion with left basilar atelectasis and/or consolidation.  Mild right basilar atelectasis. Signed by Nakul Rucker MD                      Results for orders placed or performed during the hospital encounter of 11/09/23 (from the past 24 hour(s))   CBC and Auto Differential   Result Value Ref Range     WBC 14.6 (H) 4.4 - 11.3 x10*3/uL     nRBC 0.3 (H) 0.0 - 0.0 /100 WBCs     RBC 3.15 (L) 4.00 - 5.20 x10*6/uL     Hemoglobin 8.3 (L) 12.0 - 16.0 g/dL     Hematocrit 29.7 (L) 36.0 - 46.0 %     MCV 94 80 - 100 fL     MCH 26.3 26.0 - 34.0 pg     MCHC 27.9 (L) 32.0 - 36.0 g/dL     RDW 17.8 (H) 11.5 - 14.5 %     Platelets 306 150 - 450 x10*3/uL     Neutrophils % 92.8 40.0 - 80.0 %     Immature Granulocytes %, Automated 1.0 (H) 0.0 - 0.9 %     Lymphocytes % 2.6 13.0 - 44.0 %     Monocytes % 3.4 2.0 - 10.0 %     Eosinophils % 0.1 0.0 - 6.0 %     Basophils % 0.1 0.0 - " 2.0 %     Neutrophils Absolute 13.55 (H) 1.60 - 5.50 x10*3/uL     Immature Granulocytes Absolute, Automated 0.15 0.00 - 0.50 x10*3/uL     Lymphocytes Absolute 0.38 (L) 0.80 - 3.00 x10*3/uL     Monocytes Absolute 0.50 0.05 - 0.80 x10*3/uL     Eosinophils Absolute 0.01 0.00 - 0.40 x10*3/uL     Basophils Absolute 0.02 0.00 - 0.10 x10*3/uL   Magnesium   Result Value Ref Range     Magnesium 1.67 1.60 - 2.40 mg/dL   Comprehensive metabolic panel   Result Value Ref Range     Glucose 183 (H) 74 - 99 mg/dL     Sodium 142 136 - 145 mmol/L     Potassium 4.0 3.5 - 5.3 mmol/L     Chloride 102 98 - 107 mmol/L     Bicarbonate 32 21 - 32 mmol/L     Anion Gap 12 10 - 20 mmol/L     Urea Nitrogen 59 (H) 6 - 23 mg/dL     Creatinine 1.46 (H) 0.50 - 1.05 mg/dL     eGFR 38 (L) >60 mL/min/1.73m*2     Calcium 8.5 (L) 8.6 - 10.3 mg/dL     Albumin 3.0 (L) 3.4 - 5.0 g/dL     Alkaline Phosphatase 89 33 - 136 U/L     Total Protein 5.6 (L) 6.4 - 8.2 g/dL     AST 8 (L) 9 - 39 U/L     Bilirubin, Total 0.3 0.0 - 1.2 mg/dL     ALT 25 7 - 45 U/L   Protime-INR   Result Value Ref Range     Protime 11.6 9.8 - 12.8 seconds     INR 1.0 0.9 - 1.1   aPTT   Result Value Ref Range     aPTT 30 27 - 38 seconds   Lactate   Result Value Ref Range     Lactate 3.6 (H) 0.4 - 2.0 mmol/L   Type And Screen   Result Value Ref Range     ABO TYPE O       Rh TYPE POS       ANTIBODY SCREEN NEG     Troponin I, High Sensitivity   Result Value Ref Range     Troponin I, High Sensitivity 17 (H) 0 - 13 ng/L   B-Type Natriuretic Peptide   Result Value Ref Range      (H) 0 - 99 pg/mL   Blood Gas Venous   Result Value Ref Range     POCT pH, Venous 7.30 (L) 7.33 - 7.43 pH     POCT pCO2, Venous 64 (H) 41 - 51 mm Hg     POCT pO2, Venous 36 35 - 45 mm Hg     POCT SO2, Venous 61 45 - 75 %     POCT Oxy Hemoglobin, Venous 59.0 45.0 - 75.0 %     POCT Base Excess, Venous 3.2 (H) -2.0 - 3.0 mmol/L     POCT HCO3 Calculated, Venous 31.5 (H) 22.0 - 26.0 mmol/L     Patient Temperature 37.0  degrees Celsius     FiO2 36 %     Test Comment ICU-S     Urinalysis with Reflex Microscopic and Culture   Result Value Ref Range     Color, Urine Yellow Straw, Yellow     Appearance, Urine Hazy (N) Clear     Specific Gravity, Urine 1.015 1.005 - 1.035     pH, Urine 5.0 5.0, 5.5, 6.0, 6.5, 7.0, 7.5, 8.0     Protein, Urine NEGATIVE NEGATIVE mg/dL     Glucose, Urine NEGATIVE NEGATIVE mg/dL     Blood, Urine MODERATE (2+) (A) NEGATIVE     Ketones, Urine NEGATIVE NEGATIVE mg/dL     Bilirubin, Urine NEGATIVE NEGATIVE     Urobilinogen, Urine <2.0 <2.0 mg/dL     Nitrite, Urine NEGATIVE NEGATIVE     Leukocyte Esterase, Urine LARGE (3+) (A) NEGATIVE   Microscopic Only, Urine   Result Value Ref Range     WBC, Urine >50 (A) 1-5, NONE /HPF     WBC Clumps, Urine FEW Reference range not established. /HPF     RBC, Urine >20 (A) NONE, 1-2, 3-5 /HPF     Squamous Epithelial Cells, Urine 1-9 (SPARSE) Reference range not established. /HPF     Transitional Epithelial Cells, Urine 1-2 (FEW) Reference range not established. /HPF     Bacteria, Urine 1+ (A) NONE SEEN /HPF     Budding Yeast, Urine PRESENT (A) NONE /HPF     Mucus, Urine 1+ Reference range not established. /LPF     Hyaline Casts, Urine 2+ (A) NONE /LPF            Assessment/Plan   Danielle is a 73-year-old female patient presenting to emergency department for evaluation of shortness of breath and volume overload.  Patient found to have a BNP of 116, elevated troponin, elevated lactate, elevated WBCs.  Imaging showing moderate left pleural effusion with left basilar atelectasis and mild right basilar atelectasis.  Last echocardiogram on file 2/27/2023 with an EF of 60 to 65%.  Patient having new oxygen requirements moving from 2 L/min to 4 L/min.  Patient currently receiving IV Zosyn via PICC line at [facility] for recurrent UTI.  Patient found to be UTI positive, no culture sent due to being on antibiotics chronically.  Patient given IV Lasix for volume overload in  ED.  Consult placed to cardiology.  Admission for further medical management.     Acute decompensated heart failure  Admit to telemetry per Dr. Carcamo  Intake and output  See imaging results above  Daily weight  Lasix IV 40 mg in ED  Consult cardiology and appreciate input  Low-sodium diet  Fluid restriction  Chronic Cruz catheter     Diabetes/hypothyroidism/hypertension/A-fib/hyperlipidemia     Diabetic diet  ISS  Hypoglycemia protocol  Hold home oral antidiabetic medications when med rec is complete  Continue home medications  Continue home Eliquis when med rec is complete     DVT PPx  Continue home Eliquis when med rec is complete  SCDs  Activity as tolerated     I spent 30 minutes in the professional and overall care of this patient.  Patient fully evaluated on November, plan as above.                        Revision History           Patient fully evaluated on November 11.  Debrox for bilateral ear cerumen impactions.  Continue to diurese patient and recheck labs in AM.        Patient fully evaluated on November 12.  Continue aggressive diuresis.  BUN and creatinine are stable.  Recheck labs in AM.     Patient fully evaluated on November 13.  Rise in BUN and creatinine noted and patient will transition to oral diuretics.  Recheck labs in AM.  Recheck chemistries in a.m. to check for renal function  Patient fully evaluated on November 14 and rise in BUN and creatinine noted.  Diuretics have been held as well as ARB's.  Nephrology consultation obtained.  Recheck labs in a.m.     Patient fully evaluated on November 15.  Continue to monitor BUN and creatinine diuretics been held.     Patient fully evaluated on November 16.  Continue to monitor H&H which drop was noted.  Eliquis has been held.  Continued acute kidney injury noted and appreciate nephrology consultation.  Recheck labs in AM.     Patient fully evaluated on November 17.  IV Lasix drip initiated for diuresis.  Patient has significant end-stage renal  disease.     Patient fully evaluated on November 18 and consideration for dialysis.  Permacath to be placed today by interventional radiology.  Continue to monitor lab work and monitor for further fluid overload.  Patient has 2+ edema with significant anasarca.     Patient fully evaluated on November 19 and more awake and alert today.  Continue dialysis daily for now.  Pleural effusion noted which might be resolved with dialysis.  Continue to monitor for further fluid overload and recheck labs in AM.     Patient fully evaluated on November 20.  Still very lethargic.  Continue to monitor BUN and creatinine.  Repeat chest x-ray ordered.     Patient fully evaluated on November 21 and having hallucinations.  White count is still elevated with possible pneumonia.  Pulmonary consultation for pleural effusion versus consolidation.  IV vancomycin and Merrem initiated.  Recheck labs in AM.  Case discussed with nephrology.  Thien Carcamo MD                         Discharge Meds     Your medication list        ASK your doctor about these medications        Instructions Last Dose Given Next Dose Due   acetaminophen 500 mg tablet  Commonly known as: Tylenol           Acidophilus capsule  Generic drug: lactobacillus acidophilus           apixaban 5 mg tablet  Commonly known as: Eliquis           bisacodyl 5 mg EC tablet  Commonly known as: Dulcolax           busPIRone 5 mg tablet  Commonly known as: Buspar           dilTIAZem  mg 24 hr capsule  Commonly known as: Cardizem CD           docusate sodium 250 mg capsule           gabapentin 100 mg capsule  Commonly known as: Neurontin           levothyroxine 50 mcg tablet  Commonly known as: Synthroid, Levoxyl           levothyroxine 50 mcg tablet  Commonly known as: Synthroid, Levoxyl           LORazepam 1 mg tablet  Commonly known as: Ativan           metoprolol tartrate 25 mg tablet  Commonly known as: Lopressor           nystatin 100,000 unit/gram powder  Commonly known  as: Mycostatin           pantoprazole 40 mg EC tablet  Commonly known as: ProtoNix           potassium chloride CR 20 mEq ER tablet  Commonly known as: Klor-Con M20           predniSONE 20 mg tablet  Commonly known as: Deltasone           torsemide 20 mg tablet  Commonly known as: Demadex           traMADol 50 mg tablet  Commonly known as: Ultram                    Test Results Pending At Discharge  Pending Labs       No current pending labs.            Hospital Course   Patient     Pertinent Physical Exam At Time of Discharge  Physical Exam    Outpatient Follow-Up  No future appointments.      Thien Carcamo MD

## 2023-12-21 ENCOUNTER — HOSPITAL ENCOUNTER (OUTPATIENT)
Dept: CARDIOLOGY | Facility: HOSPITAL | Age: 73
Discharge: HOME | End: 2023-12-21
Payer: OTHER MISCELLANEOUS

## 2023-12-21 PROCEDURE — 93005 ELECTROCARDIOGRAM TRACING: CPT

## 2023-12-27 LAB
Q ONSET: 222 MS
QRS COUNT: 17 BEATS
QRS DURATION: 84 MS
QT INTERVAL: 310 MS
QTC CALCULATION(BAZETT): 399 MS
QTC FREDERICIA: 367 MS
R AXIS: 85 DEGREES
T AXIS: 51 DEGREES
T OFFSET: 377 MS
VENTRICULAR RATE: 100 BPM